# Patient Record
Sex: FEMALE | Race: WHITE | Employment: UNEMPLOYED | RURAL
[De-identification: names, ages, dates, MRNs, and addresses within clinical notes are randomized per-mention and may not be internally consistent; named-entity substitution may affect disease eponyms.]

---

## 2022-12-09 ENCOUNTER — HOSPITAL ENCOUNTER (EMERGENCY)
Age: 62
Discharge: ANOTHER HOSPITAL | End: 2022-12-09
Attending: EMERGENCY MEDICINE
Payer: MEDICAID

## 2022-12-09 ENCOUNTER — APPOINTMENT (OUTPATIENT)
Dept: CT IMAGING | Age: 62
End: 2022-12-09
Attending: EMERGENCY MEDICINE
Payer: MEDICAID

## 2022-12-09 VITALS
SYSTOLIC BLOOD PRESSURE: 127 MMHG | TEMPERATURE: 98.9 F | WEIGHT: 183 LBS | OXYGEN SATURATION: 97 % | HEART RATE: 63 BPM | RESPIRATION RATE: 14 BRPM | DIASTOLIC BLOOD PRESSURE: 67 MMHG | BODY MASS INDEX: 29.54 KG/M2

## 2022-12-09 DIAGNOSIS — H49.22 LEFT ABDUCENS NERVE PALSY: Primary | ICD-10-CM

## 2022-12-09 LAB
ALBUMIN SERPL-MCNC: 3.5 G/DL (ref 3.5–5)
ALBUMIN/GLOB SERPL: 1 {RATIO} (ref 1.1–2.2)
ALP SERPL-CCNC: 135 U/L (ref 45–117)
ALT SERPL-CCNC: 23 U/L (ref 12–78)
ANION GAP SERPL CALC-SCNC: 8 MMOL/L (ref 5–15)
AST SERPL-CCNC: 16 U/L (ref 15–37)
BASOPHILS # BLD: 0 K/UL (ref 0–0.1)
BASOPHILS NFR BLD: 0 % (ref 0–1)
BILIRUB SERPL-MCNC: 0.4 MG/DL (ref 0.2–1)
BUN SERPL-MCNC: 15 MG/DL (ref 6–20)
BUN/CREAT SERPL: 12 (ref 12–20)
CALCIUM SERPL-MCNC: 8.8 MG/DL (ref 8.5–10.1)
CHLORIDE SERPL-SCNC: 106 MMOL/L (ref 97–108)
CO2 SERPL-SCNC: 27 MMOL/L (ref 21–32)
CREAT SERPL-MCNC: 1.24 MG/DL (ref 0.55–1.02)
DIFFERENTIAL METHOD BLD: ABNORMAL
EOSINOPHIL # BLD: 0.4 K/UL (ref 0–0.4)
EOSINOPHIL NFR BLD: 5 % (ref 0–7)
ERYTHROCYTE [DISTWIDTH] IN BLOOD BY AUTOMATED COUNT: 13.2 % (ref 11.5–14.5)
FLUAV RNA SPEC QL NAA+PROBE: NOT DETECTED
FLUBV RNA SPEC QL NAA+PROBE: NOT DETECTED
GLOBULIN SER CALC-MCNC: 3.6 G/DL (ref 2–4)
GLUCOSE SERPL-MCNC: 119 MG/DL (ref 65–100)
HCT VFR BLD AUTO: 34.2 % (ref 35–47)
HGB BLD-MCNC: 11.4 G/DL (ref 11.5–16)
IMM GRANULOCYTES # BLD AUTO: 0 K/UL (ref 0–0.04)
IMM GRANULOCYTES NFR BLD AUTO: 0 % (ref 0–0.5)
INR PPP: 1 (ref 0.9–1.1)
LYMPHOCYTES # BLD: 2.2 K/UL (ref 0.8–3.5)
LYMPHOCYTES NFR BLD: 32 % (ref 12–49)
MCH RBC QN AUTO: 29.8 PG (ref 26–34)
MCHC RBC AUTO-ENTMCNC: 33.3 G/DL (ref 30–36.5)
MCV RBC AUTO: 89.3 FL (ref 80–99)
MONOCYTES # BLD: 0.7 K/UL (ref 0–1)
MONOCYTES NFR BLD: 9 % (ref 5–13)
NEUTS SEG # BLD: 3.6 K/UL (ref 1.8–8)
NEUTS SEG NFR BLD: 54 % (ref 32–75)
NRBC # BLD: 0 K/UL (ref 0–0.01)
NRBC BLD-RTO: 0 PER 100 WBC
PLATELET # BLD AUTO: 299 K/UL (ref 150–400)
PMV BLD AUTO: 9.2 FL (ref 8.9–12.9)
POTASSIUM SERPL-SCNC: 3.2 MMOL/L (ref 3.5–5.1)
PROT SERPL-MCNC: 7.1 G/DL (ref 6.4–8.2)
PROTHROMBIN TIME: 10.1 SEC (ref 9–11.1)
RBC # BLD AUTO: 3.83 M/UL (ref 3.8–5.2)
SARS-COV-2, COV2: NOT DETECTED
SODIUM SERPL-SCNC: 141 MMOL/L (ref 136–145)
TSH SERPL DL<=0.05 MIU/L-ACNC: 1.34 UIU/ML (ref 0.36–3.74)
WBC # BLD AUTO: 6.9 K/UL (ref 3.6–11)

## 2022-12-09 PROCEDURE — 74011000636 HC RX REV CODE- 636: Performed by: EMERGENCY MEDICINE

## 2022-12-09 PROCEDURE — 93005 ELECTROCARDIOGRAM TRACING: CPT

## 2022-12-09 PROCEDURE — 36415 COLL VENOUS BLD VENIPUNCTURE: CPT

## 2022-12-09 PROCEDURE — 70496 CT ANGIOGRAPHY HEAD: CPT

## 2022-12-09 PROCEDURE — 74011250637 HC RX REV CODE- 250/637: Performed by: EMERGENCY MEDICINE

## 2022-12-09 PROCEDURE — 85610 PROTHROMBIN TIME: CPT

## 2022-12-09 PROCEDURE — 85025 COMPLETE CBC W/AUTO DIFF WBC: CPT

## 2022-12-09 PROCEDURE — 87636 SARSCOV2 & INF A&B AMP PRB: CPT

## 2022-12-09 PROCEDURE — 80053 COMPREHEN METABOLIC PANEL: CPT

## 2022-12-09 PROCEDURE — 70450 CT HEAD/BRAIN W/O DYE: CPT

## 2022-12-09 PROCEDURE — 84443 ASSAY THYROID STIM HORMONE: CPT

## 2022-12-09 PROCEDURE — 99285 EMERGENCY DEPT VISIT HI MDM: CPT

## 2022-12-09 RX ORDER — ACETAMINOPHEN 325 MG/1
975 TABLET ORAL
Status: COMPLETED | OUTPATIENT
Start: 2022-12-09 | End: 2022-12-09

## 2022-12-09 RX ORDER — ASPIRIN 325 MG
325 TABLET ORAL ONCE
Status: COMPLETED | OUTPATIENT
Start: 2022-12-09 | End: 2022-12-09

## 2022-12-09 RX ORDER — POTASSIUM CHLORIDE 750 MG/1
40 TABLET, FILM COATED, EXTENDED RELEASE ORAL
Status: COMPLETED | OUTPATIENT
Start: 2022-12-09 | End: 2022-12-09

## 2022-12-09 RX ADMIN — ACETAMINOPHEN 975 MG: 325 TABLET, FILM COATED ORAL at 21:27

## 2022-12-09 RX ADMIN — ASPIRIN 325 MG ORAL TABLET 325 MG: 325 PILL ORAL at 18:51

## 2022-12-09 RX ADMIN — POTASSIUM CHLORIDE 40 MEQ: 750 TABLET, FILM COATED, EXTENDED RELEASE ORAL at 16:35

## 2022-12-09 RX ADMIN — IOPAMIDOL 100 ML: 755 INJECTION, SOLUTION INTRAVENOUS at 18:26

## 2022-12-09 NOTE — ED NOTES
Assumed care of patient at this time.  Care handoff given from [address] [place] [address] Street

## 2022-12-09 NOTE — ED PROVIDER NOTES
EMERGENCY DEPARTMENT HISTORY AND PHYSICAL EXAM      Date: 12/9/2022  Patient Name: Milana Armendariz    History of Presenting Illness     Chief Complaint   Patient presents with    Eye Problem     DOUBLE VISION following a sinus infection last week - left eye with decreased tracking noted       History Provided By: Patient    HPI: Milana Armendariz, 63 y.o. female with PMHx significant for asthma, hypertension, high cholesterol, CHF, presents via private vehicle to the ED with cc of change. Patient reports for the past week she and her entire family had upper respiratory infections/sinus infections. Reports nasal congestion and minimal dry cough. Over the past several days of the illness she noticed that she was having some problems with her vision. She thought this was just due to sinus pressure. Over the past 2 days her sinus pressure increased and she noticed some blurred vision. Her friend yesterday saw her in noted that her left eye was not tracking well with her right eye. She denies any extremity weakness numbness or tingling. She reports some headaches but that is been going on since the start of her upper respiratory illness. PMHx: Significant for asthma, hypertension, high cholesterol, CHF  PSHx: No pertinent surgical history   social Hx: Former smoker. Quit in 2007. Denies drug or alcohol use. There are no other complaints, changes, or physical findings at this time. PCP: Other, MD Ruy    No current facility-administered medications on file prior to encounter. Current Outpatient Medications on File Prior to Encounter   Medication Sig Dispense Refill    DULoxetine (CYMBALTA) 30 mg capsule Take 30 mg by mouth daily. sulfaSALAzine (AZULFIDINE) 500 mg tablet Take 500 mg by mouth two (2) times a day. neomycin-colist-hydrocortisone-thonzonium (CORTISPORIN TC,COLY-MYCIN S) otic suspension Administer 3 Drops in left ear four (4) times daily.  10 mL 0    cetirizine (ZYRTEC) 10 mg tablet Take 1 Tab by mouth daily. 20 Tab 0    neomycin-polymyxin-hydrocortisone, buffered, (PEDIOTIC) 3.5-10,000-1 mg/mL-unit/mL-% otic suspension Administer 3 Drops in left ear four (4) times daily. 10 mL 0    spironolactone (ALDACTONE) 25 mg tablet Take 25 mg by mouth daily. furosemide (LASIX) 40 mg tablet Take 40 mg by mouth daily. 1.5 tab in am and 1 tab in afternoon   Indications: CHF      carvedilol (COREG) 25 mg tablet Take 25 mg by mouth daily. lisinopril (PRINIVIL, ZESTRIL) 5 mg tablet Take 5 mg by mouth daily. trospium (SANCTURA XL) 60 mg capsule Take 60 mg by mouth Daily (before breakfast). aspirin (SHAMAR CHEWABLE ASPIRIN) 81 mg chewable tablet Take 81 mg by mouth nightly. ondansetron hcl (ZOFRAN, AS HYDROCHLORIDE,) 4 mg tablet Take 4 mg by mouth every twelve (12) hours as needed for Nausea (1-2 tabs as needed for nausea). nystatin (MYCOSTATIN) 500,000 unit tab Take 2 Tabs by mouth two (2) times a day. Indications: MUCOCUTANEOUS CANDIDIASIS      ferrous sulfate 324 mg (65 mg iron) tablet Take 130 mg by mouth Daily (before breakfast). Indications: IRON DEFICIENCY ANEMIA      OMEGA-3/DHA/EPA/FISH OIL (FISH OIL HIGH POTENCY PO) Take 1 Cap by mouth two (2) times a day. calcium-cholecalciferol, d3, (CALCIUM 600 + D) 600-125 mg-unit tab Take 1 Cap by mouth. cholecalciferol, vitamin D3, (VITAMIN D3) 2,000 unit tab Take 1 Tab by mouth daily. ASHWAGANDHA ROOT EXTRACT,BULK, 2 Caps by Does Not Apply route two (2) times a day. TURMERIC (CURCUMIN) 2 Caps by Does Not Apply route three (3) times daily. B.ANI/L. ACI/L.SAL/L. PLAN/L.CARRIE (PROBIOTIC FORMULA PO) Take 2 Caps by mouth two (2) times a day.          Past History     Past Medical History:  Past Medical History:   Diagnosis Date    Asthma     Heart failure ([address]     Hypercholesteremia     Hypertension     Ill-defined condition     Psoriasis        Past Surgical History:  Past Surgical History:   Procedure Laterality Date    HX OTHER SURGICAL Bilateral     JAW plates    HX OTHER SURGICAL Right     Ankle with screws and plates       Family History:  Family History   Problem Relation Age of Onset    Heart Disease Father     Diabetes Father     Hypertension Father     Kidney Disease Father     High Cholesterol Father        Social History:  Social History     Tobacco Use    Smoking status: Former     Types: Cigarettes     Quit date: 5/15/2007     Years since quitting: 15.5    Smokeless tobacco: Never   Substance Use Topics    Alcohol use: No    Drug use: No       Allergies: Allergies   Allergen Reactions    Beef Containing Products Anaphylaxis    Pork/Porcine Containing Products Anaphylaxis    Gold Au 198 Rash         Review of Systems   Review of Systems   Constitutional:  Negative for activity change, chills and fever. HENT:  Positive for congestion, sinus pressure and sinus pain. Negative for sore throat. Eyes:  Positive for visual disturbance. Negative for pain and redness. Respiratory:  Negative for cough, chest tightness and shortness of breath. Cardiovascular:  Negative for chest pain and palpitations. Gastrointestinal:  Negative for abdominal pain, diarrhea, nausea and vomiting. Genitourinary:  Negative for dysuria, frequency and urgency. Musculoskeletal:  Negative for back pain and neck pain. Skin:  Negative for rash. Neurological:  Positive for headaches. Negative for syncope and light-headedness. Psychiatric/Behavioral:  Negative for confusion. All other systems reviewed and are negative. Physical Exam   Physical Exam  Vitals and nursing note reviewed. Constitutional:       General: She is not in acute distress. Appearance: She is well-developed. She is not diaphoretic. HENT:      Head: Normocephalic. Nose: Nose normal.      Mouth/Throat:      Pharynx: No oropharyngeal exudate. Eyes:      General: No scleral icterus.      Conjunctiva/sclera: Conjunctivae normal. Pupils: Pupils are equal, round, and reactive to light. Neck:      Thyroid: No thyromegaly. Vascular: No JVD. Trachea: No tracheal deviation. Cardiovascular:      Rate and Rhythm: Normal rate and regular rhythm. Heart sounds: No murmur heard. No friction rub. No gallop. Pulmonary:      Effort: Pulmonary effort is normal. No respiratory distress. Breath sounds: Normal breath sounds. No stridor. No wheezing or rales. Abdominal:      General: Bowel sounds are normal. There is no distension. Palpations: Abdomen is soft. Tenderness: There is no abdominal tenderness. There is no guarding or rebound. Musculoskeletal:         General: Normal range of motion. Cervical back: Normal range of motion and neck supple. Lymphadenopathy:      Cervical: No cervical adenopathy. Skin:     General: Skin is warm and dry. Findings: No erythema or rash. Neurological:      Mental Status: She is alert and oriented to person, place, and time. Motor: No abnormal muscle tone. Coordination: Coordination normal.      Comments: Presents is some disconjugate gaze. Right eye appears to move normally in all directions. On conjugate gaze left eye is able to look medially and track well with the right eye however looking laterally the eye moves much more slowly than the right eye although it eventually does get to the extreme lateral gaze. Pupils are equal and reactive to light. Light reflex is consensual in both eyes. Opens and shuts eyes without difficulty. No apparent lid lag. Face symmetrical.  Clear speech. 5 out of 5 strength in bilateral upper and lower extremities. No past-pointing. Good heel-to-shin.    Psychiatric:         Behavior: Behavior normal.           Diagnostic Study Results     Labs -     Recent Results (from the past 12 hour(s))   CBC WITH AUTOMATED DIFF    Collection Time: 12/11/22  4:00 AM   Result Value Ref Range    WBC 5.9 3.6 - 11.0 K/uL    RBC 4.03 3.80 - 5.20 M/uL    HGB 12.2 11.5 - 16.0 g/dL    HCT 37.3 35.0 - 47.0 %    MCV 92.6 80.0 - 99.0 FL    MCH 30.3 26.0 - 34.0 PG    MCHC 32.7 30.0 - 36.5 g/dL    RDW 13.2 11.5 - 14.5 %    PLATELET 901 430 - 454 K/uL    MPV 9.4 8.9 - 12.9 FL    NRBC 0.0 0  WBC    ABSOLUTE NRBC 0.00 0.00 - 0.01 K/uL    NEUTROPHILS 54 32 - 75 %    LYMPHOCYTES 31 12 - 49 %    MONOCYTES 8 5 - 13 %    EOSINOPHILS 7 0 - 7 %    BASOPHILS 0 0 - 1 %    IMMATURE GRANULOCYTES 0 0.0 - 0.5 %    ABS. NEUTROPHILS 3.2 1.8 - 8.0 K/UL    ABS. LYMPHOCYTES 1.8 0.8 - 3.5 K/UL    ABS. MONOCYTES 0.5 0.0 - 1.0 K/UL    ABS. EOSINOPHILS 0.4 0.0 - 0.4 K/UL    ABS. BASOPHILS 0.0 0.0 - 0.1 K/UL    ABS. IMM. GRANS. 0.0 0.00 - 0.04 K/UL    DF AUTOMATED     METABOLIC PANEL, COMPREHENSIVE    Collection Time: 12/11/22  4:00 AM   Result Value Ref Range    Sodium 142 136 - 145 mmol/L    Potassium 3.7 3.5 - 5.1 mmol/L    Chloride 105 97 - 108 mmol/L    CO2 28 21 - 32 mmol/L    Anion gap 9 5 - 15 mmol/L    Glucose 117 (H) 65 - 100 mg/dL    BUN 16 6 - 20 MG/DL    Creatinine 0.96 0.55 - 1.02 MG/DL    BUN/Creatinine ratio 17 12 - 20      eGFR >60 >60 ml/min/1.73m2    Calcium 9.2 8.5 - 10.1 MG/DL    Bilirubin, total 0.5 0.2 - 1.0 MG/DL    ALT (SGPT) 24 12 - 78 U/L    AST (SGOT) 13 (L) 15 - 37 U/L    Alk. phosphatase 129 (H) 45 - 117 U/L    Protein, total 7.0 6.4 - 8.2 g/dL    Albumin 3.3 (L) 3.5 - 5.0 g/dL    Globulin 3.7 2.0 - 4.0 g/dL    A-G Ratio 0.9 (L) 1.1 - 2.2     HEMOGLOBIN A1C WITH EAG    Collection Time: 12/11/22  4:00 AM   Result Value Ref Range    Hemoglobin A1c 5.7 (H) 4.0 - 5.6 %    Est. average glucose 117 mg/dL       Radiologic Studies -   CTA HEAD NECK W CONT   Final Result      CTA    1. CTA neck demonstrates no large vessel occlusion, high-grade stenosis or   aneurysm. 2. 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  Findings may represent moyamoya   disease versus chronic atherosclerotic occlusion. Correlate with brain MRI to   exclude acute infarct. 3. No intracranial aneurysm or obvious intraorbital mass lesion. CT HEAD WO CONT   Final Result   No acute intracranial abnormality. CT Results  (Last 48 hours)                 12/09/22 1826  CTA HEAD NECK W CONT Final result    Impression:      CTA    1. CTA neck demonstrates no large vessel occlusion, high-grade stenosis or   aneurysm. 2. 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 Findings may represent moyamoya   disease versus chronic atherosclerotic occlusion. Correlate with brain MRI to   exclude acute infarct. 3. No intracranial aneurysm or obvious intraorbital mass lesion. Narrative:  EXAM:  CTA HEAD NECK W CONT       INDICATION:   cva; left eye cn 6 palsy       COMPARISON:  None. CONTRAST:  100 mL of Isovue-370. TECHNIQUE:  Unenhanced  images were obtained to localize the volume for   acquisition. Multislice helical axial CT angiography was performed from the   aortic arch to the top of the head during uneventful rapid bolus intravenous   contrast administration. Coronal and sagittal reformations and 3D/MIP  post   processing were performed. CT dose reduction was achieved through use of a standardized protocol tailored   for this examination and automatic exposure control for dose modulation. This   study was analyzed by the [address] ai algorithm. FINDINGS:       CTA Head:   Proximal occlusion of the right MCA, likely chronic with extensive small vessel   collaterals formation. There is no evidence of large vessel occlusion or flow-limiting stenosis of the   intracranial internal carotid, anterior cerebral, and right middle cerebral   arteries. Calcification of the bilateral carotid siphons without significant   stenosis. The anterior communicating artery is patent.         There is no evidence of large vessel occlusion or flow-limiting stenosis of the   intracranial vertebral arteries, basilar artery, or posterior cerebral arteries. There is no definite posterior communicating arteries identified. There is no evidence of aneurysm or vascular malformation. The dural venous   sinuses and deep cerebral venous system are patent. No evidence of abnormal   enhancement on delayed phase images. Brain parenchyma demonstrates no suggestion of acute infarct. CTA NECK:   NASCET method was utilized for calculating stenosis. The aortic arch is unremarkable. The common carotid arteries demonstrate no   significant stenosis. There is no evidence of significant stenosis in the   cervical right internal carotid artery. There is no evidence of significant   stenosis in the cervical left internal carotid artery. Carotid bulbs plaques   without significant luminal narrowing.    % of right carotid artery stenosis: 0   % of left carotid artery stenosis: 0       There is a codominant vertebrobasilar arterial system. The cervical vertebral   arteries are normal in course, size and contour without significant stenosis. Dental amalgam associated metallic artifact precludes optimal evaluation of the   adjacent structures, oral cavity and oropharynx. Maxillofacial reconstruction   hardware along bilateral anterior maxillary wall. The orbits are bilaterally   unremarkable and without obvious mass lesion. Visualized soft tissues of the   skull base and neck are unremarkable. Visualized paranasal sinuses and mastoids   are patent. Visualized lung apices are clear. No acute fracture or aggressive osseous lesion. Degenerative changes of the   cervical spine. 12/09/22 1559  CT HEAD WO CONT Final result    Impression:  No acute intracranial abnormality. Narrative:  EXAM: CT HEAD WO CONT       INDICATION: Vision change; new strabismus       COMPARISON: None. CONTRAST: None. TECHNIQUE: Unenhanced CT of the head was performed using 5 mm images. Brain and   bone windows were generated. Coronal and sagittal reformats. CT dose reduction   was achieved through use of a standardized protocol tailored for this   examination and automatic exposure control for dose modulation. FINDINGS:   The ventricles and sulci are normal in size, shape and configuration. . There is   no significant white matter disease. There is no intracranial hemorrhage,   extra-axial collection, or mass effect. The basilar cisterns are open. No CT   evidence of acute infarct. The bone windows demonstrate no abnormalities. The visualized portions of the   paranasal sinuses and mastoid air cells are clear. CXR Results  (Last 48 hours)                 12/10/22 0458  XR CHEST PORT Final result    Impression:  1. No acute disease           Narrative:  INDICATION:  Congestion        Exam: Portable chest 0450. Comparison: 5/15/2017. Findings: Cardiomediastinal silhouette is within normal limits. Pulmonary   vasculature is not engorged. There are no focal parenchymal opacities,   effusions, or pneumothorax. Scarring in the left mid chest unchanged                     Medical Decision Making   I am the first provider for this patient. I reviewed the vital signs, available nursing notes, past medical history, past surgical history, family history and social history. Vital Signs-Reviewed the patient's vital signs. No data found. Pulse Oximetry Analysis - 95% on RA    Cardiac Monitor:   Rate: 61 bpm  Rhythm: Normal Sinus Rhythm        ED EKG interpretation:1609  Rhythm: Normal sinus rhythm; and regular . Rate (approx.): 61; Axis: normal; TN Interval: normal; QRS interval: normal ; ST/T wave: non-specific changes; This EKG was interpreted by Jaimee Ghosh MD,ED Provider.       Records Reviewed: Nursing Notes and Old Medical Records    Provider Notes (Medical Decision Making): DDx: CVA, cranial nerve palsy    ED Course:   Initial assessment performed. The patients presenting problems have been discussed, and they are in agreement with the care plan formulated and outlined with them. I have encouraged them to ask questions as they arise throughout their visit. ED Course as of 12/11/22 1233   Fri Dec 09, 2022   1655 Consulted teleneurology given patient's new strabismus. No other neurodeficits. Labs unremarkable other than mild hypokalemia. Reviewed physical exam findings. She was to evaluate patient via teleneurology. [CH]   1662 Patient evaluated by Dr. Jerez Person with teleneurology. PACs and patient may have a partial CN VI/abducens nerve palsy. She recommended admission for CVA work-up. Recommended obtaining a TSH. Recommended CTA head and neck CTV and MRI. MRI not available currently until next Monday. We will plan on transfer to another facility for further stroke work-up. Contacted [address] [place] [address] for possible transfer. [CH]   [phone] I spoke with Dr. Mey De, hospitalist at [facility]. Reviewed patient's history, presentation labs and imaging findings and my discussion with teleneurology Dr. Solitario Mina.  He excepted the patient as transfer to [address] [place] [address]      ED Course User Index  [CH] Laisha Wong MD         Patient with new left eye strabismus. No complete cranial nerve abnormality identified. Eye abduction is slightly reduced or slowed on the left compared to the. No other neurodeficits. Will consult teleneurology. Labs unremarkable other than mild hypokalemia at 3.2. No way of measuring intraocular pressures here as Thiago-Pen is broken. Critical Care Time:   0    Disposition:  Transfer     PLAN:  1. Discharge Medication List as of 12/9/2022  9:43 PM        2. Follow-up Information    None       Return to ED if worse     Diagnosis     Clinical Impression:   1.  Left abducens nerve palsy              Please note that this dictation was completed with Neu[employer], the computer voice recognition software. Quite often unanticipated grammatical, syntax, homophones, and other interpretive errors are inadvertently transcribed by the computer software. Please disregard these errors. Please excuse any errors that have escaped final proofreading.

## 2022-12-10 ENCOUNTER — HOSPITAL ENCOUNTER (INPATIENT)
Age: 62
LOS: 5 days | Discharge: HOME | DRG: 048 | End: 2022-12-15
Attending: INTERNAL MEDICINE | Admitting: STUDENT IN AN ORGANIZED HEALTH CARE EDUCATION/TRAINING PROGRAM
Payer: MEDICAID

## 2022-12-10 ENCOUNTER — APPOINTMENT (OUTPATIENT)
Dept: GENERAL RADIOLOGY | Age: 62
DRG: 048 | End: 2022-12-10
Attending: PHYSICIAN ASSISTANT
Payer: MEDICAID

## 2022-12-10 ENCOUNTER — APPOINTMENT (OUTPATIENT)
Dept: MRI IMAGING | Age: 62
DRG: 048 | End: 2022-12-10
Attending: PHYSICIAN ASSISTANT
Payer: MEDICAID

## 2022-12-10 DIAGNOSIS — H53.2 DIPLOPIA: ICD-10-CM

## 2022-12-10 DIAGNOSIS — H49.22 LEFT ABDUCENS NERVE PALSY: Primary | ICD-10-CM

## 2022-12-10 PROBLEM — H49.20 CRANIAL NERVE VI PALSY: Status: ACTIVE | Noted: 2022-12-10

## 2022-12-10 LAB
CHOLEST SERPL-MCNC: 166 MG/DL
HDLC SERPL-MCNC: 40 MG/DL
HDLC SERPL: 4.2 {RATIO} (ref 0–5)
LDLC SERPL CALC-MCNC: 56.4 MG/DL (ref 0–100)
TRIGL SERPL-MCNC: 348 MG/DL (ref ?–150)
VLDLC SERPL CALC-MCNC: 69.6 MG/DL

## 2022-12-10 PROCEDURE — 71045 X-RAY EXAM CHEST 1 VIEW: CPT

## 2022-12-10 PROCEDURE — 97165 OT EVAL LOW COMPLEX 30 MIN: CPT

## 2022-12-10 PROCEDURE — 74011250637 HC RX REV CODE- 250/637: Performed by: NURSE PRACTITIONER

## 2022-12-10 PROCEDURE — 74011000250 HC RX REV CODE- 250: Performed by: PHYSICIAN ASSISTANT

## 2022-12-10 PROCEDURE — 36415 COLL VENOUS BLD VENIPUNCTURE: CPT

## 2022-12-10 PROCEDURE — 86225 DNA ANTIBODY NATIVE: CPT

## 2022-12-10 PROCEDURE — 74011250637 HC RX REV CODE- 250/637: Performed by: PHYSICIAN ASSISTANT

## 2022-12-10 PROCEDURE — 70551 MRI BRAIN STEM W/O DYE: CPT

## 2022-12-10 PROCEDURE — 82164 ANGIOTENSIN I ENZYME TEST: CPT

## 2022-12-10 PROCEDURE — 80061 LIPID PANEL: CPT

## 2022-12-10 PROCEDURE — 65270000046 HC RM TELEMETRY

## 2022-12-10 PROCEDURE — 99223 1ST HOSP IP/OBS HIGH 75: CPT | Performed by: PSYCHIATRY & NEUROLOGY

## 2022-12-10 PROCEDURE — 86618 LYME DISEASE ANTIBODY: CPT

## 2022-12-10 PROCEDURE — 97116 GAIT TRAINING THERAPY: CPT

## 2022-12-10 PROCEDURE — 97161 PT EVAL LOW COMPLEX 20 MIN: CPT

## 2022-12-10 PROCEDURE — 83519 RIA NONANTIBODY: CPT

## 2022-12-10 RX ORDER — TROSPIUM CHLORIDE 20 MG/1
20 TABLET, FILM COATED ORAL 2 TIMES DAILY
Status: DISCONTINUED | OUTPATIENT
Start: 2022-12-10 | End: 2022-12-15 | Stop reason: HOSPADM

## 2022-12-10 RX ORDER — ACETAMINOPHEN 650 MG/1
650 SUPPOSITORY RECTAL
Status: DISCONTINUED | OUTPATIENT
Start: 2022-12-10 | End: 2022-12-15 | Stop reason: HOSPADM

## 2022-12-10 RX ORDER — FUROSEMIDE 40 MG/1
40 TABLET ORAL DAILY
Status: DISCONTINUED | OUTPATIENT
Start: 2022-12-10 | End: 2022-12-15 | Stop reason: HOSPADM

## 2022-12-10 RX ORDER — LISINOPRIL 5 MG/1
5 TABLET ORAL DAILY
Status: DISCONTINUED | OUTPATIENT
Start: 2022-12-11 | End: 2022-12-10

## 2022-12-10 RX ORDER — CETIRIZINE HYDROCHLORIDE 10 MG/1
10 TABLET ORAL DAILY
Status: DISCONTINUED | OUTPATIENT
Start: 2022-12-10 | End: 2022-12-15 | Stop reason: HOSPADM

## 2022-12-10 RX ORDER — HYDRALAZINE HYDROCHLORIDE 20 MG/ML
20 INJECTION INTRAMUSCULAR; INTRAVENOUS
Status: DISCONTINUED | OUTPATIENT
Start: 2022-12-10 | End: 2022-12-15 | Stop reason: HOSPADM

## 2022-12-10 RX ORDER — SODIUM CHLORIDE 0.9 % (FLUSH) 0.9 %
5-40 SYRINGE (ML) INJECTION AS NEEDED
Status: DISCONTINUED | OUTPATIENT
Start: 2022-12-10 | End: 2022-12-15 | Stop reason: HOSPADM

## 2022-12-10 RX ORDER — ACETAMINOPHEN 325 MG/1
650 TABLET ORAL
Status: DISCONTINUED | OUTPATIENT
Start: 2022-12-10 | End: 2022-12-15 | Stop reason: HOSPADM

## 2022-12-10 RX ORDER — DULOXETIN HYDROCHLORIDE 30 MG/1
60 CAPSULE, DELAYED RELEASE ORAL 2 TIMES DAILY
Status: DISCONTINUED | OUTPATIENT
Start: 2022-12-10 | End: 2022-12-15 | Stop reason: HOSPADM

## 2022-12-10 RX ORDER — BUPROPION HYDROCHLORIDE 150 MG/1
150 TABLET, EXTENDED RELEASE ORAL DAILY
Status: DISCONTINUED | OUTPATIENT
Start: 2022-12-10 | End: 2022-12-15 | Stop reason: HOSPADM

## 2022-12-10 RX ORDER — CARVEDILOL 12.5 MG/1
25 TABLET ORAL DAILY
Status: DISCONTINUED | OUTPATIENT
Start: 2022-12-10 | End: 2022-12-15 | Stop reason: HOSPADM

## 2022-12-10 RX ORDER — GUAIFENESIN 600 MG/1
600 TABLET, EXTENDED RELEASE ORAL EVERY 12 HOURS
Status: DISCONTINUED | OUTPATIENT
Start: 2022-12-10 | End: 2022-12-15 | Stop reason: HOSPADM

## 2022-12-10 RX ORDER — LANOLIN ALCOHOL/MO/W.PET/CERES
3 CREAM (GRAM) TOPICAL
Status: DISCONTINUED | OUTPATIENT
Start: 2022-12-10 | End: 2022-12-15 | Stop reason: HOSPADM

## 2022-12-10 RX ORDER — ONDANSETRON 2 MG/ML
4 INJECTION INTRAMUSCULAR; INTRAVENOUS
Status: DISCONTINUED | OUTPATIENT
Start: 2022-12-10 | End: 2022-12-15 | Stop reason: HOSPADM

## 2022-12-10 RX ORDER — BUTALBITAL, ACETAMINOPHEN AND CAFFEINE 50; 325; 40 MG/1; MG/1; MG/1
1 TABLET ORAL
Status: DISCONTINUED | OUTPATIENT
Start: 2022-12-10 | End: 2022-12-15 | Stop reason: HOSPADM

## 2022-12-10 RX ORDER — POLYETHYLENE GLYCOL 3350 17 G/17G
17 POWDER, FOR SOLUTION ORAL DAILY PRN
Status: DISCONTINUED | OUTPATIENT
Start: 2022-12-10 | End: 2022-12-15 | Stop reason: HOSPADM

## 2022-12-10 RX ORDER — BUPROPION HYDROCHLORIDE 150 MG/1
TABLET, EXTENDED RELEASE ORAL
COMMUNITY
Start: 2022-10-21

## 2022-12-10 RX ORDER — FUROSEMIDE 20 MG/1
TABLET ORAL
COMMUNITY
End: 2022-12-15

## 2022-12-10 RX ORDER — SODIUM CHLORIDE 0.9 % (FLUSH) 0.9 %
5-40 SYRINGE (ML) INJECTION EVERY 8 HOURS
Status: DISCONTINUED | OUTPATIENT
Start: 2022-12-10 | End: 2022-12-15 | Stop reason: HOSPADM

## 2022-12-10 RX ORDER — MIRABEGRON 25 MG/1
25 TABLET, FILM COATED, EXTENDED RELEASE ORAL DAILY
COMMUNITY
Start: 2022-11-28

## 2022-12-10 RX ORDER — ASPIRIN 325 MG
325 TABLET ORAL DAILY
Status: DISCONTINUED | OUTPATIENT
Start: 2022-12-10 | End: 2022-12-15 | Stop reason: HOSPADM

## 2022-12-10 RX ORDER — HYDROXYZINE 25 MG/1
25 TABLET, FILM COATED ORAL
Status: DISCONTINUED | OUTPATIENT
Start: 2022-12-10 | End: 2022-12-15 | Stop reason: HOSPADM

## 2022-12-10 RX ORDER — ONDANSETRON 4 MG/1
4 TABLET, ORALLY DISINTEGRATING ORAL
Status: DISCONTINUED | OUTPATIENT
Start: 2022-12-10 | End: 2022-12-15 | Stop reason: HOSPADM

## 2022-12-10 RX ORDER — DULOXETIN HYDROCHLORIDE 60 MG/1
60 CAPSULE, DELAYED RELEASE ORAL 2 TIMES DAILY
COMMUNITY
Start: 2022-09-06

## 2022-12-10 RX ORDER — ATORVASTATIN CALCIUM 40 MG/1
40 TABLET, FILM COATED ORAL DAILY
Status: DISCONTINUED | OUTPATIENT
Start: 2022-12-10 | End: 2022-12-15 | Stop reason: HOSPADM

## 2022-12-10 RX ADMIN — FUROSEMIDE 40 MG: 40 TABLET ORAL at 09:20

## 2022-12-10 RX ADMIN — SODIUM CHLORIDE, PRESERVATIVE FREE 10 ML: 5 INJECTION INTRAVENOUS at 22:00

## 2022-12-10 RX ADMIN — SODIUM CHLORIDE, PRESERVATIVE FREE 10 ML: 5 INJECTION INTRAVENOUS at 06:03

## 2022-12-10 RX ADMIN — DULOXETINE HYDROCHLORIDE 60 MG: 30 CAPSULE, DELAYED RELEASE ORAL at 17:37

## 2022-12-10 RX ADMIN — ASPIRIN 325 MG ORAL TABLET 325 MG: 325 PILL ORAL at 09:20

## 2022-12-10 RX ADMIN — CETIRIZINE HYDROCHLORIDE 10 MG: 10 TABLET, FILM COATED ORAL at 09:20

## 2022-12-10 RX ADMIN — TROSPIUM CHLORIDE 20 MG: 20 TABLET, FILM COATED ORAL at 17:37

## 2022-12-10 RX ADMIN — ATORVASTATIN CALCIUM 40 MG: 40 TABLET, FILM COATED ORAL at 09:20

## 2022-12-10 RX ADMIN — BUPROPION HYDROCHLORIDE 150 MG: 150 TABLET, EXTENDED RELEASE ORAL at 09:20

## 2022-12-10 RX ADMIN — BUTALBITAL, ACETAMINOPHEN, AND CAFFEINE 1 TABLET: 50; 325; 40 TABLET ORAL at 16:26

## 2022-12-10 RX ADMIN — DULOXETINE HYDROCHLORIDE 60 MG: 30 CAPSULE, DELAYED RELEASE ORAL at 09:20

## 2022-12-10 RX ADMIN — TROSPIUM CHLORIDE 20 MG: 20 TABLET, FILM COATED ORAL at 09:25

## 2022-12-10 RX ADMIN — SODIUM CHLORIDE, PRESERVATIVE FREE 10 ML: 5 INJECTION INTRAVENOUS at 16:26

## 2022-12-10 RX ADMIN — ACETAMINOPHEN 650 MG: 325 TABLET ORAL at 06:42

## 2022-12-10 RX ADMIN — GUAIFENESIN 600 MG: 600 TABLET, EXTENDED RELEASE ORAL at 21:44

## 2022-12-10 RX ADMIN — CARVEDILOL 25 MG: 12.5 TABLET, FILM COATED ORAL at 19:14

## 2022-12-10 NOTE — PROGRESS NOTES
Problem: Mobility Impaired (Adult and Pediatric)  Goal: *Acute Goals and Plan of Care (Insert Text)  Description: FUNCTIONAL STATUS PRIOR TO ADMISSION: Patient was independent and active without use of DME. Patient was independent for basic and instrumental ADLs. Over the last 48 hours prior to admission had worsening double vision due to cranial nerve 6 palsy. HOME SUPPORT PRIOR TO ADMISSION: The patient lived with spouse but did not require assist.    Physical Therapy Goals  Initiated 12/10/2022  1. Patient will move from supine to sit and sit to supine , scoot up and down, and roll side to side in bed with independence within 7 day(s). 2.  Patient will transfer from bed to chair and chair to bed with independence using the least restrictive device within 7 day(s). 3.  Patient will perform sit to stand with independence within 7 day(s). 4.  Patient will ambulate with independence for 450 feet with the least restrictive device within 7 day(s). 5.  Patient will ascend/descend 12 stairs with 1 handrail(s) with modified independence within 7 day(s). Outcome: Not Met   PHYSICAL THERAPY EVALUATION  Patient: Rachael Villalobos (71 y.o. 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 Patient lying in bed with L eye patch in place and agrees to evaluation. Reports getting out of bed and moving about room, but has some depth perception issues due to eye patch. Demonstrated independent skill supine to sit with intact sitting balance. Stood independently. Gait demonstrated decreased speed, step lengths, narrow base of support with intermittent path deviations to her L side.  She is currently moderate risk for falls due to mildly impaired standing balance with decreased gait and bed to chair transfer skills. Will need 1-2 more PT sessions to re-assess balance and mobility. Current Level of Function Impacting Discharge (mobility/balance): independent bed mobility; sba bed to chair transfers; sba ambulation 45 feet. Functional Outcome Measure: The patient scored 23/28 on the Tinetti outcome measure which is indicative of being at a moderate risk for falls at this time. Other factors to consider for discharge: independent and active PLOF with supportive family; may need balance re-ed as an outpatient. Patient will benefit from skilled therapy intervention to address the above noted impairments. PLAN :  Recommendations and Planned Interventions: bed mobility training, transfer training, gait training, therapeutic exercises, neuromuscular re-education, patient and family training/education, and therapeutic activities      Frequency/Duration: Patient will be followed by physical therapy:  3 times a week to address goals. Recommendation for discharge: (in order for the patient to meet his/her long term goals)  Outpatient physical therapy follow up recommended for balance re-ed and/or ocular motor training. This discharge recommendation:  Has not yet been discussed the attending provider and/or case management    IF patient discharges home will need the following DME: rolling walker if standing balance and gait do not improve       SUBJECTIVE:   Patient stated  I'm just waiting to see what the MRI results say.      OBJECTIVE DATA SUMMARY:   HISTORY:    Past Medical History:   Diagnosis Date    Asthma     Heart failure ([facility] [address]     Hypercholesteremia     Hypertension     Ill-defined condition     Psoriasis      Past Surgical History:   Procedure Laterality Date    HX OTHER SURGICAL Bilateral     JAW plates    HX OTHER SURGICAL Right     Ankle with screws and plates       Personal factors and/or comorbidities impacting plan of care: remote L MCA occlusion    Home Situation  Home Environment: Private residence  One/Two Story Residence: Two story  Living Alone: No  Support Systems: Spouse/Significant Other  Patient Expects to be Discharged to[de-identified] Home  Current DME Used/Available at Home: None  Tub or Shower Type: Shower    EXAMINATION/PRESENTATION/DECISION MAKING:   Critical Behavior:  Neurologic State: Alert  Orientation Level: Oriented X4  Cognition: Appropriate decision making, Appropriate for age attention/concentration, Appropriate safety awareness, Follows commands  Safety/Judgement: Awareness of environment, Good awareness of safety precautions, Decreased insight into deficits  Hearing: Auditory  Auditory Impairment: None  Skin:  no findings  Edema: none  Range Of Motion:  AROM: Within functional limits           PROM: Within functional limits           Strength:    Strength:  Within functional limits                    Tone & Sensation:   Tone: Normal                              Coordination:  Coordination: Within functional limits  Vision:   Diplopia: Yes (L lateral rectus weakness)  Functional Mobility:  Bed Mobility:  Rolling: Independent  Supine to Sit: Independent  Sit to Supine: Independent  Scooting: Independent  Transfers:  Sit to Stand: Independent  Stand to Sit: Independent        Bed to Chair: Supervision              Balance:   Sitting: Intact  Standing: Impaired  Standing - Static: Good  Standing - Dynamic : Fair;Occasional  Ambulation/Gait Training:  Distance (ft): 45 Feet (ft)     Ambulation - Level of Assistance: Supervision;Stand-by assistance     Gait Description (WDL): Exceptions to WDL  Gait Abnormalities: Altered arm swing;Path deviations        Base of Support: Narrowed     Speed/Carmen: Pace decreased (<100 feet/min)  Step Length: Right shortened;Left shortened        Interventions: Safety awareness training    Functional Measure:  Tinetti test:    Sitting Balance: 1  Arises: 2  Attempts to Rise: 2  Immediate Standing Balance: 2  Standing Balance: 2  Nudged: 2  Eyes Closed: 1  Turn 360 Degrees - Continuous/Discontinuous: 1  Turn 360 Degrees - Steady/Unsteady: 1  Sitting Down: 2  Balance Score: 16 Balance total score  Indication of Gait: 1  R Step Length/Height: 0  L Step Length/Height: 0  R Foot Clearance: 1  L Foot Clearance: 1  Step Symmetry: 1  Step Continuity: 0  Path: 1  Trunk: 1  Walking Time: 1  Gait Score: 7 Gait total score  Total Score: 23/28 Overall total score         Tinetti Tool Score Risk of Falls  <19 = High Fall Risk  19-24 = Moderate Fall Risk  25-28 = Low Fall Risk  Tinetti ME. Performance-Oriented Assessment of Mobility Problems in Elderly Patients. [facility]; [id number].  (Scoring Description: PT Bulletin Feb. 10, 1993)    Older adults: Alexandra Gutierrez et al, 2009; n = 1000 [facility] elderly evaluated with ABC, BRUCE, ADL, and IADL)  · Mean BRUCE score for males aged 69-68 years = 26.21(3.40)  · Mean BRUCE score for females age 69-68 years = 25.16(4.30)  · Mean BRUCE score for males over 80 years = 23.29(6.02)  · Mean BRUCE score for females over 80 years = 17.20(8.32)        Physical Therapy Evaluation Charge Determination   History Examination Presentation Decision-Making   HIGH Complexity :3+ comorbidities / personal factors will impact the outcome/ POC  MEDIUM Complexity : 3 Standardized tests and measures addressing body structure, function, activity limitation and / or participation in recreation  MEDIUM Complexity : Evolving with changing characteristics  Other outcome measures Tinetti  MEDIUM      Based on the above components, the patient evaluation is determined to be of the following complexity level: MEDIUM    Pain Rating:  Reports headache - waiting to hear if additional pain med is possible    Activity Tolerance:   Good and SpO2 stable on RA    After treatment patient left in no apparent distress:   Supine in bed and Call bell within reach    COMMUNICATION/EDUCATION:   The patients plan of care was discussed with: Occupational therapist and Registered nurse. Fall prevention education was provided and the patient/caregiver indicated understanding., Patient/family have participated as able in goal setting and plan of care. , and Patient/family agree to work toward stated goals and plan of care.     Thank you for this referral.  Sadiq Cortes, PT   Time Calculation: 14 mins

## 2022-12-10 NOTE — ED NOTES
TRANSFER - OUT REPORT:    Verbal report given to Ashlie SANCHEZ RN on Rene Ordaz  being transferred to ED [facility] ([phone]) for routine progression of care       Report consisted of patients Situation, Background, Assessment and   Recommendations(SBAR). Information from the following report(s) SBAR, ED Summary, [facility] and Recent Results was reviewed with the receiving nurse. Lines:   Peripheral IV 12/09/22 Right Antecubital (Active)   Site Assessment Clean, dry, & intact 12/09/22 1741   Phlebitis Assessment 0 12/09/22 1741   Infiltration Assessment 0 12/09/22 1741   Dressing Status Clean, dry, & intact 12/09/22 1741        Opportunity for questions and clarification was provided.       Patient transported with:   [id number]

## 2022-12-10 NOTE — ED NOTES
Writer spoke to [facility]. Bed assignment 5097 Report # [phone].  Current [facility] ETA 2200

## 2022-12-10 NOTE — CONSULTS
Neurology Note    Patient ID:  Monica Cutler  [id number]  66 y.o.  1960      Date of Consultation:  December 10, 2022    Referring Physician: Dr. Lian Domingo    Reason for Consultation:  diplopia      Assessment and Plan:    The patient is a 72-year-old female with a subacute onset of diplopia after developing a recent viral illness. 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 Diplopia:    Differential includes ischemic, infectious, compressive, autoimmune  Initial head CT with no abnormality. CTA with no aneursymal compression    Ideally, the patient should receive a brain MRI with and without contrast.  Current MRI is ordered for without contrast due to elevated creatinine. If able, patient should receive a contrasted image. If the patient is unable to get a MRI due to prior metal, then the patient should receive a follow-up head CT with contrast.  After this and if symptoms are persisting without a clear etiology, the patient should receive a lumbar puncture. I did discuss this at length with the patient today. After discussing the risk and benefits of the procedure, the procedure should be performed under fluoroscopy. Please coordinate this with interventional radiology if needed. I will obtain serology looking for possible metabolic or autoimmune etiologies. She will continue to wear the eye patch for the time being. Htn: maintain sbp < 140  Continue aspirin 81 mg. Neurological continue to follow along closely in this complex patient with risk for neurological deterioration due to examination abnormalities and unclear diagnosis           Subjective: my vision     History of Present Illness:   Monica Cutler is a 63 y.o. female with a history of hypertension and dyslipidemia who presented to the emergency department due to double vision .   The patient states that her family has all had a recent viral illness and she began to develop head congestion and a cough within the past 2 weeks. She reports that she was coughing so much that she was losing her voice. She continued to develop a increasing pressure in her head that was bifrontal in nature. She then began noticing that she was developing double vision. Initially she felt the objects were horizontal but has noticed more of a skew double vision over the past 48 hours. She was out to lunch with a friend who noticed that her 1 eye was not moving and conjugate with the others and ultimately it was decided that she should seek medical attention. Since admission, there has been no worsening nor improvement in her symptoms. She initially went to [facility] where she did have a CT of her head and a CTA with no clear etiology. This morning, the patient denies any focal numbness, tingling, or weakness. She does continue to have the head congestion. Her diplopia does resolve when she closes 1 eye. She has good visual acuity.       Past Medical History:   Diagnosis Date    Asthma     Heart failure ([address]     Hypercholesteremia     Hypertension     Ill-defined condition     Psoriasis    The patient reports of having had a history of Lyme disease in the past that was quite severe is causing her to be wheelchair-bound for an extended period of time    Past Surgical History:   Procedure Laterality Date    HX OTHER SURGICAL Bilateral     JAW plates    HX OTHER SURGICAL Right     Ankle with screws and plates        Family History   Problem Relation Age of Onset    Heart Disease Father     Diabetes Father     Hypertension Father     Kidney Disease Father     High Cholesterol Father         Social History     Tobacco Use    Smoking status: Former     Types: Cigarettes     Quit date: 5/15/2007     Years since quitting: 15.5    Smokeless tobacco: Never   Substance Use Topics    Alcohol use: No        Allergies   Allergen Reactions    Beef Containing Products Anaphylaxis    Pork/Porcine Containing Products Anaphylaxis Gold Au 198 Rash        Prior to Admission medications    Medication Sig Start Date End Date Taking? Authorizing Provider   Myrbetriq 25 mg ER tablet Take 25 mg by mouth daily. 11/28/22   Provider, Historical   buPROPion SR (WELLBUTRIN SR) 150 mg SR tablet TAKE 1 TABLET BY MOUTH TWICE DAILY DO NOT CRUSH, CHEW OR SPLIT 10/21/22   Provider, Historical   DULoxetine (CYMBALTA) 60 mg capsule Take 60 mg by mouth two (2) times a day. 9/6/22   Provider, Historical   furosemide (LASIX) 20 mg tablet Take  by mouth. Provider, Historical   DULoxetine (CYMBALTA) 30 mg capsule Take 30 mg by mouth daily. Ruy Decker MD   sulfaSALAzine (AZULFIDINE) 500 mg tablet Take 500 mg by mouth two (2) times a day. Ruy Decker MD   neomycin-colist-hydrocortisone-thonzonium (CORTISPORIN TC,COLY-MYCIN S) otic suspension Administer 3 Drops in left ear four (4) times daily. 5/16/17   Melody Collado MD   cetirizine (ZYRTEC) 10 mg tablet Take 1 Tab by mouth daily. 5/16/17   Melody Collado MD   neomycin-polymyxin-hydrocortisone, buffered, (PEDIOTIC) 3.5-10,000-1 mg/mL-unit/mL-% otic suspension Administer 3 Drops in left ear four (4) times daily. 5/16/17   Melody Collado MD   spironolactone (ALDACTONE) 25 mg tablet Take 25 mg by mouth daily. Ruy Decker MD   furosemide (LASIX) 40 mg tablet Take 40 mg by mouth daily. 1.5 tab in am and 1 tab in afternoon   Indications: CHF    Ruy Decker MD   carvedilol (COREG) 25 mg tablet Take 25 mg by mouth daily. Ruy Decker MD   lisinopril (PRINIVIL, ZESTRIL) 5 mg tablet Take 5 mg by mouth daily. Ruy Decker MD   trospium (SANCTURA XL) 60 mg capsule Take 60 mg by mouth Daily (before breakfast). Ruy Decker MD   aspirin (SHAMAR CHEWABLE ASPIRIN) 81 mg chewable tablet Take 81 mg by mouth nightly. Ruy Decker MD   ondansetron hcl (ZOFRAN, AS HYDROCHLORIDE,) 4 mg tablet Take 4 mg by mouth every twelve (12) hours as needed for Nausea (1-2 tabs as needed for nausea).     Other, MD Ruy nystatin (MYCOSTATIN) 500,000 unit tab Take 2 Tabs by mouth two (2) times a day. Indications: MUCOCUTANEOUS CANDIDIASIS    Ruy Decker MD   ferrous sulfate 324 mg (65 mg iron) tablet Take 130 mg by mouth Daily (before breakfast). Indications: IRON DEFICIENCY ANEMIA    Ruy Decker MD   OMEGA-3/DHA/EPA/FISH OIL (FISH OIL HIGH POTENCY PO) Take 1 Cap by mouth two (2) times a day. Ruy Decker MD   calcium-cholecalciferol, d3, (CALCIUM 600 + D) 600-125 mg-unit tab Take 1 Cap by mouth. Ruy Decker MD   cholecalciferol, vitamin D3, (VITAMIN D3) 2,000 unit tab Take 1 Tab by mouth daily. Ruy Decker MD   ASHWAGANDHA ROOT EXTRACT,BULK, 2 Caps by Does Not Apply route two (2) times a day. Ruy Decker MD   TURMERIC (CURCUMIN) 2 Caps by Does Not Apply route three (3) times daily. Ruy Decker MD   B.ANI/L. ACI/L.SAL/L. PLAN/L.CARRIE (PROBIOTIC FORMULA PO) Take 2 Caps by mouth two (2) times a day.     Ruy Decker MD     Current Facility-Administered Medications   Medication Dose Route Frequency    aspirin tablet 325 mg  325 mg Oral DAILY    atorvastatin (LIPITOR) tablet 40 mg  40 mg Oral DAILY    sodium chloride (NS) flush 5-40 mL  5-40 mL IntraVENous Q8H    sodium chloride (NS) flush 5-40 mL  5-40 mL IntraVENous PRN    acetaminophen (TYLENOL) tablet 650 mg  650 mg Oral Q6H PRN    Or    acetaminophen (TYLENOL) suppository 650 mg  650 mg Rectal Q6H PRN    polyethylene glycol (MIRALAX) packet 17 g  17 g Oral DAILY PRN    ondansetron (ZOFRAN ODT) tablet 4 mg  4 mg Oral Q8H PRN    Or    ondansetron (ZOFRAN) injection 4 mg  4 mg IntraVENous Q6H PRN    buPROPion ER (ZYBAN,BUPROBAN) tablet 150 mg  150 mg Oral DAILY    cetirizine (ZYRTEC) tablet 10 mg  10 mg Oral DAILY    DULoxetine (CYMBALTA) capsule 60 mg  60 mg Oral BID    furosemide (LASIX) tablet 40 mg  40 mg Oral DAILY    trospium (SANCTURA) tablet 20 mg  20 mg Oral BID    hydrOXYzine HCL (ATARAX) tablet 25 mg  25 mg Oral TID PRN    hydrALAZINE (APRESOLINE) 20 mg/mL injection 20 mg  20 mg IntraVENous Q6H PRN    melatonin tablet 3 mg  3 mg Oral QHS PRN       Review of Systems:    General, constitutional:head congestion  Eyes, vision: negative  Ears, nose, throat: negative  Cardiovascular, heart: negative  Respiratory: negative  Gastrointestinal: negative  Genitourinary: negative  Musculoskeletal: negative  Skin and integumentary: negative  Psychiatric: negative  Endocrine: negative  Neurological: negative, except for HPI  Hematologic/lymphatic: negative  Allergy/immunology: negative    Objective:     Visit Vitals  BP (!) 137/50   Pulse (!) 58   Temp 97.3 °F (36.3 °C)   Resp 18   Ht 5' 6\" (1.676 m)   Wt 186 lb 8 oz (84.6 kg)   SpO2 98%   BMI 30.10 kg/m²       Physical Exam:    General:  appears well nourished in no acute distress  Neck: no carotid bruits  Lungs: clear to auscultation  Heart:  no murmurs, regular rate  Lower extremity: peripheral pulses palpable and no edema  Skin: intact    Neurological exam:    Awake, alert, oriented to person, place and time  Recent and remote memory were normal  Attention and concentration were intact  Language was intact. There was no aphasia  Speech: no dysarthria  Fund of knowledge was preserved    Cranial nerves:   II-XII were tested    Perrrla  Fundoscopic examination revealed venous pulsations and no clear abnormalities  Visual fields were full  She does have a disconjugate primary gaze with intorsion of the left. She cannot fully abduct her left eye. It appears to be a incomplete 6th nerve palsy. There was no ptosis. There was no fatigable ptosis  Facial sensation:  normal and symmetric  Facial motor: normal and symmetric  Hearing intact  SCM strength intact  Tongue: midline without fasciculations    Motor: Tone normal  Pronator drift was absent    No evidence of fasciculations    Strength testing:   deltoid triceps biceps Wrist ext. Wrist flex. intrinsics Hip flex. Hip ext. Knee ext.   Knee flex Dorsi flex Plantar flex   Right 5 5 5 5 5 5 5 5 5 5 5 5   Left 5 5 5 5 5 5 5 5 5 5 5 5         Sensory:  Upper extremity: intact to pp, light touch, and vibration > 10 seconds  Lower extremity: intact to pp, light touch, and vibration > 10 seconds    Reflexes:    Right Left  Biceps  2 2  Triceps     2 2  Brachiorad. 2 2  Patella  1 1  Achilles 1 1    Plantar response:  flexor bilaterally    Cerebellar testing:  no tremor apparent, finger/nose and rachell were intact    Gait:not assessed due to concerns over safety. Labs:     Lab Results   Component Value Date/Time    Hemoglobin A1c 5.8 (H) 04/07/2015 09:21 AM    Sodium 141 12/09/2022 03:55 PM    Potassium 3.2 (L) 12/09/2022 03:55 PM    Chloride 106 12/09/2022 03:55 PM    Glucose 119 (H) 12/09/2022 03:55 PM    BUN 15 12/09/2022 03:55 PM    Creatinine 1.24 (H) 12/09/2022 03:55 PM    Calcium 8.8 12/09/2022 03:55 PM    WBC 6.9 12/09/2022 03:55 PM    HCT 34.2 (L) 12/09/2022 03:55 PM    HGB 11.4 (L) 12/09/2022 03:55 PM    PLATELET 212 61/72/8099 03:55 PM       Imaging:    No results found for this or any previous visit. Results from [place] encounter on 12/09/22    CTA HEAD NECK W CONT    Narrative  EXAM:  CTA HEAD NECK W CONT    INDICATION:   cva; left eye cn 6 palsy    COMPARISON:  None. CONTRAST:  100 mL of Isovue-370. TECHNIQUE:  Unenhanced  images were obtained to localize the volume for  acquisition. Multislice helical axial CT angiography was performed from the  aortic arch to the top of the head during uneventful rapid bolus intravenous  contrast administration. Coronal and sagittal reformations and 3D/MIP  post  processing were performed. CT dose reduction was achieved through use of a standardized protocol tailored  for this examination and automatic exposure control for dose modulation. This  study was analyzed by the [address] ai algorithm.     FINDINGS:    CTA Head:  Proximal occlusion of the right MCA, likely chronic with extensive small vessel  collaterals formation. There is no evidence of large vessel occlusion or flow-limiting stenosis of the  intracranial internal carotid, anterior cerebral, and right middle cerebral  arteries. Calcification of the bilateral carotid siphons without significant  stenosis. The anterior communicating artery is patent. There is no evidence of large vessel occlusion or flow-limiting stenosis of the  intracranial vertebral arteries, basilar artery, or posterior cerebral arteries. There is no definite posterior communicating arteries identified. There is no evidence of aneurysm or vascular malformation. The dural venous  sinuses and deep cerebral venous system are patent. No evidence of abnormal  enhancement on delayed phase images. Brain parenchyma demonstrates no suggestion of acute infarct. CTA NECK:  NASCET method was utilized for calculating stenosis. The aortic arch is unremarkable. The common carotid arteries demonstrate no  significant stenosis. There is no evidence of significant stenosis in the  cervical right internal carotid artery. There is no evidence of significant  stenosis in the cervical left internal carotid artery. Carotid bulbs plaques  without significant luminal narrowing.  % of right carotid artery stenosis: 0  % of left carotid artery stenosis: 0    There is a codominant vertebrobasilar arterial system. The cervical vertebral  arteries are normal in course, size and contour without significant stenosis. Dental amalgam associated metallic artifact precludes optimal evaluation of the  adjacent structures, oral cavity and oropharynx. Maxillofacial reconstruction  hardware along bilateral anterior maxillary wall. The orbits are bilaterally  unremarkable and without obvious mass lesion. Visualized soft tissues of the  skull base and neck are unremarkable. Visualized paranasal sinuses and mastoids  are patent. Visualized lung apices are clear. No acute fracture or aggressive osseous lesion. Degenerative changes of the  cervical spine. Impression  CTA  1. CTA neck demonstrates no large vessel occlusion, high-grade stenosis or  aneurysm. 2. 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 Findings may represent moyamoya  disease versus chronic atherosclerotic occlusion. Correlate with brain MRI to  exclude acute infarct. 3. No intracranial aneurysm or obvious intraorbital mass lesion. I did independently review the head CT from December 9, 2022. There is no acute abnormality. CTA of the head and neck revealed no large vessel flow-limiting stenosis. There was possible occlusion of the left MCA with small vessel collateralization.   Brain MRI was recommended                 Active Problems:    Cranial nerve VI palsy (12/10/2022)                   Signed By:  Nhung Covarrubias DO FAAN    December 10, 2022

## 2022-12-10 NOTE — PROGRESS NOTES
Speech Pathology Note    SLP orders received. Chart reviewed and spoke with RN. Patient currently headed [facility] for MRI. Will defer SLP evaluation at this time. Note patient passed the [employer] Screen and Easy to Chew/Thin Liquid diet was ordered.      Sandy Mccann M.S., CCC-SLP

## 2022-12-10 NOTE — H&P
History and Physical    Patient: Monica Cutler MRN: [id number]  SSN: [id number]    YOB: 1960  Age: 63 y.o. Sex: female      Subjective:      Monica Cutler is a 63 y.o. female who presents to the emergency department at [facility] with double vision for approximately 48 hours and probable lateral rectus nerve palsy on the left. Patient became concerned after she could not track her left eye outwards and sought medical evaluation. 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 CT of the head revealed no acute intracranial process. Patient complains of a viral  infection with head congestion for approximately 3 weeks which has been going around her house. Unfortunately the children in the home were diagnosed with flu although she remains negative. She also complains of sinus pressure and bilateral painful congestion of the ears. Past Medical History:   Diagnosis Date    Asthma     Heart failure ([address]     Hypercholesteremia     Hypertension     Ill-defined condition     Psoriasis      Past Surgical History:   Procedure Laterality Date    HX OTHER SURGICAL Bilateral     JAW plates    HX OTHER SURGICAL Right     Ankle with screws and plates      Family History   Problem Relation Age of Onset    Heart Disease Father     Diabetes Father     Hypertension Father     Kidney Disease Father     High Cholesterol Father      Social History     Tobacco Use    Smoking status: Former     Types: Cigarettes     Quit date: 5/15/2007     Years since quitting: 15.5    Smokeless tobacco: Never   Substance Use Topics    Alcohol use: No      Prior to Admission medications    Medication Sig Start Date End Date Taking? Authorizing Provider   DULoxetine (CYMBALTA) 30 mg capsule Take 30 mg by mouth daily.     Other, MD Ruy   sulfaSALAzine (AZULFIDINE) 500 mg tablet Take 500 mg by mouth two (2) times a day. Ruy Decker MD   neomycin-colist-hydrocortisone-thonzonium (CORTISPORIN TC,COLY-MYCIN S) otic suspension Administer 3 Drops in left ear four (4) times daily. 5/16/17   Yoandy Luciano MD   cetirizine (ZYRTEC) 10 mg tablet Take 1 Tab by mouth daily. 5/16/17   Yoandy Luciano MD   neomycin-polymyxin-hydrocortisone, buffered, (PEDIOTIC) 3.5-10,000-1 mg/mL-unit/mL-% otic suspension Administer 3 Drops in left ear four (4) times daily. 5/16/17   Yoandy Luciano MD   spironolactone (ALDACTONE) 25 mg tablet Take 25 mg by mouth daily. Ruy Decker MD   furosemide (LASIX) 40 mg tablet Take 40 mg by mouth daily. 1.5 tab in am and 1 tab in afternoon   Indications: CHF    Ruy Decker MD   carvedilol (COREG) 25 mg tablet Take 25 mg by mouth daily. Ruy Decker MD   lisinopril (PRINIVIL, ZESTRIL) 5 mg tablet Take 5 mg by mouth daily. Ruy Decker MD   trospium (SANCTURA XL) 60 mg capsule Take 60 mg by mouth Daily (before breakfast). Ruy Decker MD   aspirin (SHAMAR CHEWABLE ASPIRIN) 81 mg chewable tablet Take 81 mg by mouth nightly. Ruy Decker MD   ondansetron hcl (ZOFRAN, AS HYDROCHLORIDE,) 4 mg tablet Take 4 mg by mouth every twelve (12) hours as needed for Nausea (1-2 tabs as needed for nausea). Ruy Decker MD   nystatin (MYCOSTATIN) 500,000 unit tab Take 2 Tabs by mouth two (2) times a day. Indications: MUCOCUTANEOUS CANDIDIASIS    Ruy Decker MD   ferrous sulfate 324 mg (65 mg iron) tablet Take 130 mg by mouth Daily (before breakfast). Indications: IRON DEFICIENCY ANEMIA    Ruy Decker MD   OMEGA-3/DHA/EPA/FISH OIL (FISH OIL HIGH POTENCY PO) Take 1 Cap by mouth two (2) times a day. Ruy Decker MD   calcium-cholecalciferol, d3, (CALCIUM 600 + D) 600-125 mg-unit tab Take 1 Cap by mouth. Ruy Decker MD   cholecalciferol, vitamin D3, (VITAMIN D3) 2,000 unit tab Take 1 Tab by mouth daily.     Other, MD ARMOND [place], 2 Caps by Does Not Apply route two (2) times a day. Ruy Decker MD   TURMERIC (CURCUMIN) 2 Caps by Does Not Apply route three (3) times daily. Ruy Decker MD   B.ANI/L. ACI/L.SAL/L. PLAN/L.CARRIE (PROBIOTIC FORMULA PO) Take 2 Caps by mouth two (2) times a day. Ruy Decker MD        Allergies   Allergen Reactions    Beef Containing Products Anaphylaxis    Pork/Porcine Containing Products Anaphylaxis    Gold Au 198 Rash       Review of Systems:  Review of Systems   Constitutional:  Positive for malaise/fatigue. Negative for chills and fever. HENT:  Positive for congestion, ear pain and sinus pain. Eyes:  Positive for double vision. Respiratory:  Negative for cough and shortness of breath. Cardiovascular:  Negative for chest pain and leg swelling. Gastrointestinal:  Negative for abdominal pain, nausea and vomiting. Genitourinary: Negative. Musculoskeletal: Negative. Skin: Negative. Neurological: Negative. Psychiatric/Behavioral: Negative. Objective:     Recent Results (from the past 24 hour(s))   CBC WITH AUTOMATED DIFF    Collection Time: 12/09/22  3:55 PM   Result Value Ref Range    WBC 6.9 3.6 - 11.0 K/uL    RBC 3.83 3.80 - 5.20 M/uL    HGB 11.4 (L) 11.5 - 16.0 g/dL    HCT 34.2 (L) 35.0 - 47.0 %    MCV 89.3 80.0 - 99.0 FL    MCH 29.8 26.0 - 34.0 PG    MCHC 33.3 30.0 - 36.5 g/dL    RDW 13.2 11.5 - 14.5 %    PLATELET 288 969 - 048 K/uL    MPV 9.2 8.9 - 12.9 FL    NRBC 0.0 0  WBC    ABSOLUTE NRBC 0.00 0.00 - 0.01 K/uL    NEUTROPHILS 54 32 - 75 %    LYMPHOCYTES 32 12 - 49 %    MONOCYTES 9 5 - 13 %    EOSINOPHILS 5 0 - 7 %    BASOPHILS 0 0 - 1 %    IMMATURE GRANULOCYTES 0 0.0 - 0.5 %    ABS. NEUTROPHILS 3.6 1.8 - 8.0 K/UL    ABS. LYMPHOCYTES 2.2 0.8 - 3.5 K/UL    ABS. MONOCYTES 0.7 0.0 - 1.0 K/UL    ABS. EOSINOPHILS 0.4 0.0 - 0.4 K/UL    ABS. BASOPHILS 0.0 0.0 - 0.1 K/UL    ABS. IMM.  GRANS. 0.0 0.00 - 0.04 K/UL    DF AUTOMATED     METABOLIC PANEL, COMPREHENSIVE    Collection Time: 12/09/22  3:55 PM Result Value Ref Range    Sodium 141 136 - 145 mmol/L    Potassium 3.2 (L) 3.5 - 5.1 mmol/L    Chloride 106 97 - 108 mmol/L    CO2 27 21 - 32 mmol/L    Anion gap 8 5 - 15 mmol/L    Glucose 119 (H) 65 - 100 mg/dL    BUN 15 6 - 20 MG/DL    Creatinine 1.24 (H) 0.55 - 1.02 MG/DL    BUN/Creatinine ratio 12 12 - 20      eGFR 50 (L) >60 ml/min/1.73m2    Calcium 8.8 8.5 - 10.1 MG/DL    Bilirubin, total 0.4 0.2 - 1.0 MG/DL    ALT (SGPT) 23 12 - 78 U/L    AST (SGOT) 16 15 - 37 U/L    Alk. phosphatase 135 (H) 45 - 117 U/L    Protein, total 7.1 6.4 - 8.2 g/dL    Albumin 3.5 3.5 - 5.0 g/dL    Globulin 3.6 2.0 - 4.0 g/dL    A-G Ratio 1.0 (L) 1.1 - 2.2     PROTHROMBIN TIME + INR    Collection Time: 12/09/22  3:55 PM   Result Value Ref Range    INR 1.0 0.9 - 1.1      Prothrombin time 10.1 9.0 - 11.1 sec   TSH 3RD GENERATION    Collection Time: 12/09/22  3:55 PM   Result Value Ref Range    TSH 1.34 0.36 - 3.74 uIU/mL   EKG, 12 LEAD, INITIAL    Collection Time: 12/09/22  4:09 PM   Result Value Ref Range    Ventricular Rate 61 BPM    Atrial Rate 61 BPM    P-R Interval 182 ms    QRS Duration 110 ms    Q-T Interval 460 ms    QTC Calculation (Bezet) 463 ms    Calculated P Axis -26 degrees    Calculated R Axis 36 degrees    Calculated T Axis -48 degrees    Diagnosis       Sinus rhythm with occasional premature ventricular complexes and fusion   complexes  ST & T wave abnormality, consider inferior ischemia  ST & T wave abnormality, consider anterolateral ischemia  Prolonged QT  Abnormal ECG  No previous ECGs available     COVID-19 WITH INFLUENZA A/B    Collection Time: 12/09/22  6:45 PM   Result Value Ref Range    SARS-CoV-2 by PCR Not detected NOTD      Influenza A by PCR Not detected NOTD      Influenza B by PCR Not detected NOTD          No orders to display        Vitals:    12/10/22 0153   Weight: 84.6 kg (186 lb 8 oz)   Height: 5' 6\" (1.676 m)        Physical Exam:  Physical Exam  Vitals reviewed.    HENT:      Head: Normocephalic and atraumatic. Eyes:      Comments: Right eye is normal there is left eye lateral rectus palsy present with the inability to track her eye to the left subjective double vision although patient does have gross visual acuity, normal   Cardiovascular:      Rate and Rhythm: Normal rate and regular rhythm. Heart sounds: Normal heart sounds. Pulmonary:      Effort: Pulmonary effort is normal.      Breath sounds: Normal breath sounds. Abdominal:      General: Abdomen is flat. Bowel sounds are normal.      Palpations: Abdomen is soft. Musculoskeletal:         General: Normal range of motion. Cervical back: Normal range of motion and neck supple. Skin:     General: Skin is warm and dry. Neurological:      Mental Status: She is alert and oriented to person, place, and time. Mental status is at baseline. Comments: Left eye as above   Psychiatric:         Mood and Affect: Mood normal.        Assessment:     Hospital Problems  Date Reviewed: 6/23/2015            Codes Class Noted POA    Cranial nerve VI palsy ICD-10-CM: H49.20  ICD-9-CM: 378.54  12/10/2022 Unknown           Plan:     Impression\plan:    1. Respiratory viral infection  Has been present for approximately 2 weeks with sinus congestion  CT of the head without obvious sinus congestion  Negative for COVID or influenza  Decongestant  CXR    2. 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   Possibly representing moyamoya disease versus chronic atherosclerotic occlusion  CT of the head without acute intracranial process  Telemetry  Lipid panel  Aspirin  Lipitor    3. Essential hypertension  Medication reconciliation to be performed although it appears patient is on Coreg and Lasix  Hydralazine for systolic blood pressure greater 170    4. Depression  Continue Wellbutrin    5. History of Lyme disease  Stable and treated    6.   Bladder spasm  Continue Myrbetriq or equivalent    CODE STATUS: Full code    DVT prophylaxis: SCDs  Ulcer prophylaxis: Not indicated    Time of evaluation 55 minutes    Acacian reconciliation could not be completed due to the patient not knowing her medications    Signed By: Lazaro Killian PA-C     December 10, 2022

## 2022-12-10 NOTE — PROGRESS NOTES
Hospitalist Progress Note    NAME: Pranav Agosto   :  1960   MRN:  [id number]       Assessment / Plan:  . Respiratory viral infection  Has been present for approximately 2 weeks with sinus congestion  CT of the head without obvious sinus congestion  Negative for COVID or influenza  Decongestant ordered   CXR no acute procss     2. 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 Small areas of signal abnormality in the medial left thalamus and caudate/corona radiata inferiorly may represent subacute/chronic infarct particularly given chronic   Appreciate Neuro input  recc lumbar puncture based on MRI findings  Pt continues with headache - Fioricet added   Echocardiogram pending   CTA of the neck demonstrates no large vessel occlusion or high-grade stenosis  CTA of the head demonstrates probable chronic occlusion of the left MCA with formation of small vessel collateralization. Possibly representing moyamoya disease versus chronic atherosclerotic occlusion  CT of the head without acute intracranial process  Lipid panel  Trigly - 348 LDL 56 pt on Atorvastin   Hgb A1c pending      Essential hypertension  - resume Coreg , pt states not taking Lisinopril or Spironolactone   - Hydralazine for systolic blood pressure greater 170     Depression  Continue Wellbutrin      History of Lyme disease  Stable and treated    6. Bladder spasm  Continue Myrbetriq or equivalent       30.0 - 39.9 Obese / Body mass index is 30.1 kg/m². Estimated discharge date:   Barriers: Lumbar puncture     Code status: Full  Prophylaxis:  ASA SCDs  Recommended Disposition: Home w/Family     Subjective:     Chief Complaint / Reason for Physician Visit  \"Pt seen still wearing eye patch complaining of double vision \". Discussed with RN events overnight.      Review of Systems:  Symptom Y/N Comments  Symptom Y/N Comments   Fever/Chills n   Chest Pain n    Poor Appetite    Edema     Cough    Abdominal Pain     Sputum    Joint Pain     SOB/HEMPHILL n   Pruritis/Rash     Nausea/vomit    Tolerating PT/OT     Diarrhea    Tolerating Diet     Constipation    Other       Could NOT obtain due to:      Objective:     VITALS:   Last 24hrs VS reviewed since prior progress note. Most recent are:  Patient Vitals for the past 24 hrs:   Temp Pulse Resp BP SpO2   12/10/22 0256 97.3 °F (36.3 °C) (!) 58 18 (!) 137/50 98 %     No intake or output data in the 24 hours ending 12/10/22 0734     I had a face to face encounter and independently examined this patient on 12/10/2022, as outlined below:  PHYSICAL EXAM:  General: WD, WN. Alert, cooperative, no acute distress    EENT:  diplopia Anicteric sclerae. MMM  Resp:  CTA bilaterally, no wheezing or rales. No accessory muscle use  CV:  Regular  rhythm,  No edema  GI:  Soft, Non distended, Non tender. +Bowel sounds  Neurologic:  Alert and oriented X 3, normal speech,   Psych:   Good insight. Not anxious nor agitated  Skin:  No rashes. No jaundice    Reviewed most current lab test results and cultures  YES  Reviewed most current radiology test results   YES  Review and summation of old records today    NO  Reviewed patient's current orders and MAR    YES  PMH/SH reviewed - no change compared to H&P  ________________________________________________________________________  Care Plan discussed with:    Comments   Patient x    Family      RN x    Care Manager     Consultant                        Multidiciplinary team rounds were held today with , nursing, pharmacist and clinical coordinator. Patient's plan of care was discussed; medications were reviewed and discharge planning was addressed.      ________________________________________________________________________  Total NON critical care TIME:  30   Minutes    Total CRITICAL CARE TIME Spent:   Minutes non procedure based      Comments   >50% of visit spent in counseling and coordination of care     ________________________________________________________________________  Teodoro Scott, NP     Procedures: see electronic medical records for all procedures/Xrays and details which were not copied into this note but were reviewed prior to creation of Plan. LABS:  I reviewed today's most current labs and imaging studies. Pertinent labs include:  Recent Labs     12/09/22  1555   WBC 6.9   HGB 11.4*   HCT 34.2*        Recent Labs     12/09/22  1555      K 3.2*      CO2 27   *   BUN 15   CREA 1.24*   CA 8.8   ALB 3.5   TBILI 0.4   ALT 23   INR 1.0       Signed: Shelia Scott, NP

## 2022-12-10 NOTE — PROGRESS NOTES
Problem: Self Care Deficits Care Plan (Adult)  Goal: *Acute Goals and Plan of Care (Insert Text)  Description: FUNCTIONAL STATUS PRIOR TO ADMISSION: Patient was independent and active without use of DME.     HOME SUPPORT: The patient lived with family but did not require assist.    Occupational Therapy Goals  Initiated 12/10/2022  1. Patient will perform shower transfer with supervision/set-up within 7 day(s). 2.  Patient will perform simple home management with supervision/set-up within 7 day(s). Outcome: Progressing Towards Goal     OCCUPATIONAL THERAPY EVALUATION  Patient: Pranav Agosto (55 y.o. female)  Date: 12/10/2022  Primary Diagnosis: Cranial nerve VI palsy [H49.20]       Precautions: Fall      ASSESSMENT  Based on the objective data described below, the patient presents with decreased ocular motor and reported diplopia. Pt reports the past couple of days diplopia and pressure in head towards L lateral side and behind L eye with ocular ROM. Pt received supine in bed with no family present. Pt reports IND with ADL and has been up to use the toilet since admitted. Pt does report being off balance. Pt with decreased vision, balance, and depth perception. Anticipate pt will discharge home with no further OT needs pending medical workup. Current Level of Function Impacting Discharge (ADLs/self-care): Supervision          Patient will benefit from skilled therapy intervention to address the above noted impairments. PLAN :  Recommendations and Planned Interventions: functional mobility training and visual/perceptual training    Frequency/Duration: Patient will be followed by occupational therapy 1 time a week to address goals. Recommendation for discharge: (in order for the patient to meet his/her long term goals)  No skilled occupational therapy/ follow up rehabilitation needs identified at this time.     This discharge recommendation:  Has not yet been discussed the attending provider and/or case management    IF patient discharges home will need the following DME: none       SUBJECTIVE:   Patient stated It feels like pressure and I keep seeing double .     OBJECTIVE DATA SUMMARY:   HISTORY:   Past Medical History:   Diagnosis Date    Asthma     Heart failure ([address]     Hypercholesteremia     Hypertension     Ill-defined condition     Psoriasis      Past Surgical History:   Procedure Laterality Date    HX OTHER SURGICAL Bilateral     JAW plates    HX OTHER SURGICAL Right     Ankle with screws and plates       Expanded or extensive additional review of patient history:     Home Situation  Home Environment: Private residence  One/Two Story Residence: Two story  Living Alone: No  Support Systems: Spouse/Significant Other  Patient Expects to be Discharged to[de-identified] Home  Current DME Used/Available at Home: None  Tub or Shower Type: Shower    Hand dominance: Right    EXAMINATION OF PERFORMANCE DEFICITS:  Cognitive/Behavioral Status:  Neurologic State: Alert  Orientation Level: Oriented X4  Cognition: Appropriate decision making; Appropriate for age attention/concentration; Appropriate safety awareness; Follows commands  Perception: Appears intact  Perseveration: No perseveration noted  Safety/Judgement: Awareness of environment;Good awareness of safety precautions;Decreased insight into deficits      Hearing: Auditory  Auditory Impairment: None    Vision/Perceptual:      Diplopia: Yes (L lateral rectus weakness)     Pt reports pressure when reaching end points of ocular ROM     Range of Motion:  AROM: Within functional limits  PROM: Within functional limits          Strength:    Strength: Within functional limits                Coordination:  Coordination: Within functional limits  Fine Motor Skills-Upper: Left Intact; Right Intact         Tone & Sensation:  Tone: Normal                         Balance:  Sitting: Intact  Standing: Impaired  Standing - Static: Good  Standing - Dynamic : Fair;Occasional    Functional Mobility and Transfers for ADLs:  Bed Mobility:  Rolling: Independent  Supine to Sit: Independent  Sit to Supine: Independent  Scooting: Independent    Transfers:  Sit to Stand: Independent  Stand to Sit: Independent  Bed to Chair: Supervision    ADL Assessment:       Oral Facial Hygiene/Grooming: Independent              Upper Body Dressing: Independent    Lower Body Dressing: Independent    Toileting: Independent                  ADL Intervention and task modifications:            Cognitive Retraining  Safety/Judgement: Awareness of environment;Good awareness of safety precautions;Decreased insight into deficits      Occupational Therapy Evaluation Charge Determination   History Examination Decision-Making   LOW Complexity : Brief history review  LOW Complexity : 1-3 performance deficits relating to physical, cognitive , or psychosocial skils that result in activity limitations and / or participation restrictions  LOW Complexity : No comorbidities that affect functional and no verbal or physical assistance needed to complete eval tasks       Based on the above components, the patient evaluation is determined to be of the following complexity level: LOW     Activity Tolerance: WNL    After treatment patient left in no apparent distress:    Supine in bed    COMMUNICATION/EDUCATION:   The patients plan of care was discussed with: Physical therapist.     Home safety education was provided and the patient/caregiver indicated understanding. This patients plan of care is appropriate for delegation to \[facility]\"".     Thank you for this referral.  Shelia Ponce OT  Time Calculation: 9 mins

## 2022-12-10 NOTE — PROGRESS NOTES
-MRI pending completion of MRI Safety Screening Form. Screening form needs signatures.  -Patient cannot be scanned until this form is completed. - CALL MRI when this has been successfully completed at [phone].

## 2022-12-10 NOTE — PROGRESS NOTES
Pt arrived from OSH at approx 0000. Pt is A&OX4, ambulatory, VS stable. Provider notified of arrival. RN notified that there are no telemetry boxes available but when one is available RN will be notified. Awaiting further orders at this time. Pt is resting comfortably, Call bell in reach.

## 2022-12-11 ENCOUNTER — APPOINTMENT (OUTPATIENT)
Dept: MRI IMAGING | Age: 62
DRG: 048 | End: 2022-12-11
Attending: PSYCHIATRY & NEUROLOGY
Payer: MEDICAID

## 2022-12-11 ENCOUNTER — APPOINTMENT (OUTPATIENT)
Dept: GENERAL RADIOLOGY | Age: 62
DRG: 048 | End: 2022-12-11
Attending: PSYCHIATRY & NEUROLOGY
Payer: MEDICAID

## 2022-12-11 LAB
ALBUMIN SERPL-MCNC: 3.3 G/DL (ref 3.5–5)
ALBUMIN/GLOB SERPL: 0.9 {RATIO} (ref 1.1–2.2)
ALP SERPL-CCNC: 129 U/L (ref 45–117)
ALT SERPL-CCNC: 24 U/L (ref 12–78)
ANION GAP SERPL CALC-SCNC: 9 MMOL/L (ref 5–15)
AST SERPL-CCNC: 13 U/L (ref 15–37)
ATRIAL RATE: 61 BPM
BASOPHILS # BLD: 0 K/UL (ref 0–0.1)
BASOPHILS NFR BLD: 0 % (ref 0–1)
BILIRUB SERPL-MCNC: 0.5 MG/DL (ref 0.2–1)
BUN SERPL-MCNC: 16 MG/DL (ref 6–20)
BUN/CREAT SERPL: 17 (ref 12–20)
CALCIUM SERPL-MCNC: 9.2 MG/DL (ref 8.5–10.1)
CALCULATED P AXIS, ECG09: -26 DEGREES
CALCULATED R AXIS, ECG10: 36 DEGREES
CALCULATED T AXIS, ECG11: -48 DEGREES
CHLORIDE SERPL-SCNC: 105 MMOL/L (ref 97–108)
CO2 SERPL-SCNC: 28 MMOL/L (ref 21–32)
CREAT SERPL-MCNC: 0.96 MG/DL (ref 0.55–1.02)
DIAGNOSIS, 93000: NORMAL
DIFFERENTIAL METHOD BLD: NORMAL
EOSINOPHIL # BLD: 0.4 K/UL (ref 0–0.4)
EOSINOPHIL NFR BLD: 7 % (ref 0–7)
ERYTHROCYTE [DISTWIDTH] IN BLOOD BY AUTOMATED COUNT: 13.2 % (ref 11.5–14.5)
EST. AVERAGE GLUCOSE BLD GHB EST-MCNC: 117 MG/DL
GLOBULIN SER CALC-MCNC: 3.7 G/DL (ref 2–4)
GLUCOSE SERPL-MCNC: 117 MG/DL (ref 65–100)
HBA1C MFR BLD: 5.7 % (ref 4–5.6)
HCT VFR BLD AUTO: 37.3 % (ref 35–47)
HGB BLD-MCNC: 12.2 G/DL (ref 11.5–16)
IMM GRANULOCYTES # BLD AUTO: 0 K/UL (ref 0–0.04)
IMM GRANULOCYTES NFR BLD AUTO: 0 % (ref 0–0.5)
LYMPHOCYTES # BLD: 1.8 K/UL (ref 0.8–3.5)
LYMPHOCYTES NFR BLD: 31 % (ref 12–49)
MCH RBC QN AUTO: 30.3 PG (ref 26–34)
MCHC RBC AUTO-ENTMCNC: 32.7 G/DL (ref 30–36.5)
MCV RBC AUTO: 92.6 FL (ref 80–99)
MONOCYTES # BLD: 0.5 K/UL (ref 0–1)
MONOCYTES NFR BLD: 8 % (ref 5–13)
NEUTS SEG # BLD: 3.2 K/UL (ref 1.8–8)
NEUTS SEG NFR BLD: 54 % (ref 32–75)
NRBC # BLD: 0 K/UL (ref 0–0.01)
NRBC BLD-RTO: 0 PER 100 WBC
P-R INTERVAL, ECG05: 182 MS
PLATELET # BLD AUTO: 282 K/UL (ref 150–400)
PMV BLD AUTO: 9.4 FL (ref 8.9–12.9)
POTASSIUM SERPL-SCNC: 3.7 MMOL/L (ref 3.5–5.1)
PROT SERPL-MCNC: 7 G/DL (ref 6.4–8.2)
Q-T INTERVAL, ECG07: 460 MS
QRS DURATION, ECG06: 110 MS
QTC CALCULATION (BEZET), ECG08: 463 MS
RBC # BLD AUTO: 4.03 M/UL (ref 3.8–5.2)
SODIUM SERPL-SCNC: 142 MMOL/L (ref 136–145)
VENTRICULAR RATE, ECG03: 61 BPM
WBC # BLD AUTO: 5.9 K/UL (ref 3.6–11)

## 2022-12-11 PROCEDURE — 74011250637 HC RX REV CODE- 250/637: Performed by: PHYSICIAN ASSISTANT

## 2022-12-11 PROCEDURE — 86695 HERPES SIMPLEX TYPE 1 TEST: CPT

## 2022-12-11 PROCEDURE — 99233 SBSQ HOSP IP/OBS HIGH 50: CPT | Performed by: PSYCHIATRY & NEUROLOGY

## 2022-12-11 PROCEDURE — 87205 SMEAR GRAM STAIN: CPT

## 2022-12-11 PROCEDURE — 74011250637 HC RX REV CODE- 250/637: Performed by: NURSE PRACTITIONER

## 2022-12-11 PROCEDURE — 82042 OTHER SOURCE ALBUMIN QUAN EA: CPT

## 2022-12-11 PROCEDURE — 84157 ASSAY OF PROTEIN OTHER: CPT

## 2022-12-11 PROCEDURE — 36415 COLL VENOUS BLD VENIPUNCTURE: CPT

## 2022-12-11 PROCEDURE — 83873 ASSAY OF CSF PROTEIN: CPT

## 2022-12-11 PROCEDURE — 85025 COMPLETE CBC W/AUTO DIFF WBC: CPT

## 2022-12-11 PROCEDURE — 82945 GLUCOSE OTHER FLUID: CPT

## 2022-12-11 PROCEDURE — 80053 COMPREHEN METABOLIC PANEL: CPT

## 2022-12-11 PROCEDURE — 70553 MRI BRAIN STEM W/O & W/DYE: CPT

## 2022-12-11 PROCEDURE — 89050 BODY FLUID CELL COUNT: CPT

## 2022-12-11 PROCEDURE — 74011000250 HC RX REV CODE- 250: Performed by: PHYSICIAN ASSISTANT

## 2022-12-11 PROCEDURE — A9576 INJ PROHANCE MULTIPACK: HCPCS | Performed by: INTERNAL MEDICINE

## 2022-12-11 PROCEDURE — 86617 LYME DISEASE ANTIBODY: CPT

## 2022-12-11 PROCEDURE — 83036 HEMOGLOBIN GLYCOSYLATED A1C: CPT

## 2022-12-11 PROCEDURE — 65270000046 HC RM TELEMETRY

## 2022-12-11 PROCEDURE — 74011250636 HC RX REV CODE- 250/636: Performed by: INTERNAL MEDICINE

## 2022-12-11 RX ORDER — HYDROMORPHONE HYDROCHLORIDE 2 MG/1
1 TABLET ORAL
Status: DISCONTINUED | OUTPATIENT
Start: 2022-12-11 | End: 2022-12-15 | Stop reason: HOSPADM

## 2022-12-11 RX ADMIN — BUTALBITAL, ACETAMINOPHEN, AND CAFFEINE 1 TABLET: 50; 325; 40 TABLET ORAL at 07:27

## 2022-12-11 RX ADMIN — GUAIFENESIN 600 MG: 600 TABLET, EXTENDED RELEASE ORAL at 21:44

## 2022-12-11 RX ADMIN — HYDROMORPHONE HYDROCHLORIDE 1 MG: 2 TABLET ORAL at 21:45

## 2022-12-11 RX ADMIN — CETIRIZINE HYDROCHLORIDE 10 MG: 10 TABLET, FILM COATED ORAL at 10:23

## 2022-12-11 RX ADMIN — TROSPIUM CHLORIDE 20 MG: 20 TABLET, FILM COATED ORAL at 10:23

## 2022-12-11 RX ADMIN — MELATONIN 3 MG: at 21:44

## 2022-12-11 RX ADMIN — DULOXETINE HYDROCHLORIDE 60 MG: 30 CAPSULE, DELAYED RELEASE ORAL at 10:18

## 2022-12-11 RX ADMIN — SODIUM CHLORIDE, PRESERVATIVE FREE 10 ML: 5 INJECTION INTRAVENOUS at 21:48

## 2022-12-11 RX ADMIN — ATORVASTATIN CALCIUM 40 MG: 40 TABLET, FILM COATED ORAL at 10:18

## 2022-12-11 RX ADMIN — GUAIFENESIN 600 MG: 600 TABLET, EXTENDED RELEASE ORAL at 10:18

## 2022-12-11 RX ADMIN — SODIUM CHLORIDE, PRESERVATIVE FREE 10 ML: 5 INJECTION INTRAVENOUS at 18:17

## 2022-12-11 RX ADMIN — SODIUM CHLORIDE, PRESERVATIVE FREE 10 ML: 5 INJECTION INTRAVENOUS at 05:40

## 2022-12-11 RX ADMIN — ASPIRIN 325 MG ORAL TABLET 325 MG: 325 PILL ORAL at 10:18

## 2022-12-11 RX ADMIN — FUROSEMIDE 40 MG: 40 TABLET ORAL at 10:18

## 2022-12-11 RX ADMIN — TROSPIUM CHLORIDE 20 MG: 20 TABLET, FILM COATED ORAL at 18:16

## 2022-12-11 RX ADMIN — BUTALBITAL, ACETAMINOPHEN, AND CAFFEINE 1 TABLET: 50; 325; 40 TABLET ORAL at 23:23

## 2022-12-11 RX ADMIN — DULOXETINE HYDROCHLORIDE 60 MG: 30 CAPSULE, DELAYED RELEASE ORAL at 18:16

## 2022-12-11 RX ADMIN — GADOTERIDOL 15 ML: 279.3 INJECTION, SOLUTION INTRAVENOUS at 14:10

## 2022-12-11 RX ADMIN — BUPROPION HYDROCHLORIDE 150 MG: 150 TABLET, EXTENDED RELEASE ORAL at 10:18

## 2022-12-11 RX ADMIN — CARVEDILOL 25 MG: 12.5 TABLET, FILM COATED ORAL at 10:18

## 2022-12-11 RX ADMIN — HYDROMORPHONE HYDROCHLORIDE 1 MG: 2 TABLET ORAL at 14:52

## 2022-12-11 NOTE — PROGRESS NOTES
Bedside and Verbal shift change report given to [address] (oncoming nurse) by Adam Salcido RN (offgoing nurse). Report included the following information Plan of care, SBAR and Kardex.

## 2022-12-11 NOTE — PROGRESS NOTES
Hospitalist Progress Note    NAME: Dylan Titus   :  1960   MRN:  [id number]       Assessment / Plan:  1400 pt requesting additional pain medications states fiorcet not helpful   Patient for lumbar puncture to rule out thyroid component of left palsy. Power Serna Respiratory viral infection  Has been present for approximately 2 weeks with sinus congestion  CT of the head without obvious sinus congestion  Negative for COVID or influenza  Decongestant ordered   CXR no acute procss     2. 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 Possibly representing moyamoya disease versus chronic atherosclerotic occlusion  CT of the head without acute intracranial process  Lipid panel  Trigly - 348 LDL 56 pt on Atorvastin   Hgb A1c 5.7      Essential hypertension  - resume Coreg , pt states not taking Lisinopril or Spironolactone   - Hydralazine for systolic blood pressure greater 170  - Blood pressures improved systolic blood pressure 248I overnight  -Continue with Coreg for now     Depression  Continue Wellbutrin      History of Lyme disease  Stable and treated    6. Bladder spasm  Continue Myrbetriq or equivalent       30.0 - 39.9 Obese / Body mass index is 30.1 kg/m².     Estimated discharge date:   Barriers: Lumbar puncture     Code status: Full  Prophylaxis:  ASA SCDs  Recommended Disposition: Home w/Family Subjective:     Chief Complaint / Reason for Physician Visit  \"Pt seen states head pressure and vice  feeling around head improving with decongestant as well as Fioricet\". Discussed with RN events overnight. Review of Systems:  Symptom Y/N Comments  Symptom Y/N Comments   Fever/Chills n   Chest Pain n    Poor Appetite    Edema     Cough    Abdominal Pain     Sputum    Joint Pain     SOB/HEMPHILL n   Pruritis/Rash     Nausea/vomit    Tolerating PT/OT     Diarrhea    Tolerating Diet y    Constipation    Other headache y      Could NOT obtain due to:      Objective:     VITALS:   Last 24hrs VS reviewed since prior progress note. Most recent are:  Patient Vitals for the past 24 hrs:   Temp Pulse Resp BP SpO2   12/11/22 0826 98.6 °F (37 °C) 69 17 (!) 147/78 98 %   12/11/22 0342 97.3 °F (36.3 °C) 62 16 (!) 140/73 95 %   12/11/22 0049 97.9 °F (36.6 °C) 66 16 (!) 146/71 97 %   12/10/22 2058 97.9 °F (36.6 °C) 66 18 (!) 149/67 97 %   12/10/22 1629 -- 70 -- (!) 158/81 --   12/10/22 1626 98.6 °F (37 °C) 67 -- (!) 163/69 97 %   12/10/22 1219 97.9 °F (36.6 °C) 68 -- (!) 137/50 99 %   12/10/22 1200 -- 73 -- -- --       Intake/Output Summary (Last 24 hours) at 12/11/2022 0849  Last data filed at 12/10/2022 2058  Gross per 24 hour   Intake 240 ml   Output --   Net 240 ml        I had a face to face encounter and independently examined this patient on 12/11/2022, as outlined below:  PHYSICAL EXAM:  General: WD, WN. Alert, cooperative, no acute distress    EENT:  diplopia Anicteric sclerae. MMM  Resp:  CTA bilaterally, no wheezing or rales. No accessory muscle use  CV:  Regular  rhythm,  No edema  GI:  Soft, Non distended, Non tender. +Bowel sounds  Neurologic:  Alert and oriented X 3, normal speech,   Psych:   Good insight. Not anxious nor agitated  Skin:  No rashes.   No jaundice    Reviewed most current lab test results and cultures  YES  Reviewed most current radiology test results   YES  Review and summation of old records today    NO  Reviewed patient's current orders and MAR    YES  PMH/SH reviewed - no change compared to H&P  ________________________________________________________________________  Care Plan discussed with:    Comments   Patient x    Family      RN x    Care Manager     Consultant                        Multidiciplinary team rounds were held today with , nursing, pharmacist and clinical coordinator. Patient's plan of care was discussed; medications were reviewed and discharge planning was addressed. ________________________________________________________________________  Total NON critical care TIME:  30   Minutes    Total CRITICAL CARE TIME Spent:   Minutes non procedure based      Comments   >50% of visit spent in counseling and coordination of care     ________________________________________________________________________  Rexann Hunger. Timothy Matson NP     Procedures: see electronic medical records for all procedures/Xrays and details which were not copied into this note but were reviewed prior to creation of Plan. LABS:  I reviewed today's most current labs and imaging studies. Pertinent labs include:  Recent Labs     12/11/22  0400 12/09/22  1555   WBC 5.9 6.9   HGB 12.2 11.4*   HCT 37.3 34.2*    299     Recent Labs     12/11/22  0400 12/09/22  1555    141   K 3.7 3.2*    106   CO2 28 27   * 119*   BUN 16 15   CREA 0.96 1.24*   CA 9.2 8.8   ALB 3.3* 3.5   TBILI 0.5 0.4   ALT 24 23   INR  --  1.0       Signed: Shelia Matson, NP

## 2022-12-11 NOTE — PROGRESS NOTES
Bedside and Verbal shift change report given to Tracy Vazquez RN (oncoming nurse) by Tres Juares RN (offgoing nurse). Report included the following information SBAR and Kardex.

## 2022-12-11 NOTE — PROGRESS NOTES
Neurology Note    Patient ID:  Jahaira Lock  [id number]  48 y.o.  1960      Date of Consultation:  December 11, 2022      Assessment and Plan:    The patient is a 28-year-old female with a subacute onset of diplopia after developing a recent viral illness. 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 Diplopia:    Differential includes ischemic, infectious, autoimmune  Brain MRI with no obvious etiology. MRI negative for an acute stroke. Subtle abnormality with was felt to be possibly related to a chronic ischemia. Atypical pattern. The patient does need a follow-up brain MRI with contrast.  This order has been placed  The patient should receive a lumbar puncture. I had discussed this with the patient. We discussed this again today. After reviewing the risks and benefits with the patient and given her medical conditions and body habitus, it was decided that this should be performed under fluoroscopy. The order was placed with appropriate labs. Initial head CT with no abnormality. CTA with no aneursymal compression  serology looking for possible metabolic or autoimmune etiologies pending. She will continue to wear the eye patch for the time being. Htn: maintain sbp < 140  Continue aspirin 81 mg. Hemoglobin a1c was at goal - 5.7  Ldl: 56, triglycerides  348      Neurological continue to follow along closely            Subjective: my vision is still double     History of Present Illness:   Jahaira Lock is a 63 y.o. female with a history of hypertension and dyslipidemia who presented to the emergency department due to double vision . The patient states that her family has all had a recent viral illness and she began to develop head congestion and a cough within the past 2 weeks. She reports that she was coughing so much that she was losing her voice. She continued to develop a increasing pressure in her head that was bifrontal in nature.   She then began noticing that she was developing double vision. Initially she felt the objects were horizontal but has noticed more of a skew double vision over the past 48 hours. She was out to lunch with a friend who noticed that her 1 eye was not moving and conjugate with the others and ultimately it was decided that she should seek medical attention. Since admission, there has been no worsening nor improvement in her symptoms. She initially went to [facility] where she did have a CT of her head and a CTA with no clear etiology. The patient does continue to struggle with her diplopia. She does need to keep her eye patch on.       Past Medical History:   Diagnosis Date    Asthma     Heart failure ([address]     Hypercholesteremia     Hypertension     Ill-defined condition     Psoriasis    The patient reports of having had a history of Lyme disease in the past that was quite severe is causing her to be wheelchair-bound for an extended period of time    Past Surgical History:   Procedure Laterality Date    HX OTHER SURGICAL Bilateral     JAW plates    HX OTHER SURGICAL Right     Ankle with screws and plates        Family History   Problem Relation Age of Onset    Heart Disease Father     Diabetes Father     Hypertension Father     Kidney Disease Father     High Cholesterol Father         Social History     Tobacco Use    Smoking status: Former     Types: Cigarettes     Quit date: 5/15/2007     Years since quitting: 15.5    Smokeless tobacco: Never   Substance Use Topics    Alcohol use: No        Allergies   Allergen Reactions    Beef Containing Products Anaphylaxis    Pork/Porcine Containing Products Anaphylaxis    Gold Au 198 Rash          Current Facility-Administered Medications   Medication Dose Route Frequency    aspirin tablet 325 mg  325 mg Oral DAILY    atorvastatin (LIPITOR) tablet 40 mg  40 mg Oral DAILY    sodium chloride (NS) flush 5-40 mL  5-40 mL IntraVENous Q8H    sodium chloride (NS) flush 5-40 mL  5-40 mL IntraVENous PRN    acetaminophen (TYLENOL) tablet 650 mg  650 mg Oral Q6H PRN    Or    acetaminophen (TYLENOL) suppository 650 mg  650 mg Rectal Q6H PRN    polyethylene glycol (MIRALAX) packet 17 g  17 g Oral DAILY PRN    ondansetron (ZOFRAN ODT) tablet 4 mg  4 mg Oral Q8H PRN    Or    ondansetron (ZOFRAN) injection 4 mg  4 mg IntraVENous Q6H PRN    buPROPion ER (ZYBAN,BUPROBAN) tablet 150 mg  150 mg Oral DAILY    cetirizine (ZYRTEC) tablet 10 mg  10 mg Oral DAILY    DULoxetine (CYMBALTA) capsule 60 mg  60 mg Oral BID    furosemide (LASIX) tablet 40 mg  40 mg Oral DAILY    trospium (SANCTURA) tablet 20 mg  20 mg Oral BID    hydrOXYzine HCL (ATARAX) tablet 25 mg  25 mg Oral TID PRN    hydrALAZINE (APRESOLINE) 20 mg/mL injection 20 mg  20 mg IntraVENous Q6H PRN    melatonin tablet 3 mg  3 mg Oral QHS PRN    butalbital-acetaminophen-caffeine (FIORICET, ESGIC) -40 mg per tablet 1 Tablet  1 Tablet Oral Q4H PRN    guaiFENesin ER (MUCINEX) tablet 600 mg  600 mg Oral Q12H    carvediloL (COREG) tablet 25 mg  25 mg Oral DAILY       Review of Systems:    General, constitutional:head congestion  Eyes, vision: negative  Ears, nose, throat: negative  Cardiovascular, heart: negative  Respiratory: negative  Gastrointestinal: negative  Genitourinary: negative  Musculoskeletal: negative  Skin and integumentary: negative  Psychiatric: negative  Endocrine: negative  Neurological: negative, except for HPI  Hematologic/lymphatic: negative  Allergy/immunology: negative    Objective:     Visit Vitals  BP (!) 147/78   Pulse 69   Temp 98.6 °F (37 °C)   Resp 17   Ht 5' 6\" (1.676 m)   Wt 186 lb 8 oz (84.6 kg)   SpO2 98%   BMI 30.10 kg/m²       Physical Exam:    General:  appears well nourished in no acute distress  Neck: no carotid bruits  Lungs: clear to auscultation  Heart:  no murmurs, regular rate  Lower extremity: peripheral pulses palpable and no edema  Skin: intact    Neurological exam:    Awake, alert, oriented to person, place and time  Recent and remote memory were normal  Attention and concentration were intact  Language was intact. There was no aphasia  Speech: no dysarthria  Fund of knowledge was preserved    Cranial nerves:   II-XII were tested    Perrrla  Visual fields were full  She does have a disconjugate primary gaze with intorsion of the left. She cannot fully abduct her left eye. It appears to be a incomplete 6th nerve palsy. persisting  There was no ptosis. There was no fatigable ptosis  Facial sensation:  normal and symmetric  Facial motor: normal and symmetric  Hearing intact  SCM strength intact  Tongue: midline without fasciculations    Motor: Tone normal  Pronator drift was absent    No evidence of fasciculations    Strength testing:   deltoid triceps biceps Wrist ext. Wrist flex. intrinsics Hip flex. Hip ext. Knee ext. Knee flex Dorsi flex Plantar flex   Right 5 5 5 5 5 5 5 5 5 5 5 5   Left 5 5 5 5 5 5 5 5 5 5 5 5       Sensory:  Upper extremity: intact to pp,  Lower extremity: intact to pp,    Reflexes:    Right Left  Biceps  2 2  Triceps     2 2  Brachiorad. 2 2  Patella  1 1  Achilles 1 1  Cerebellar testing:  no tremor apparent, finger/nose and rachell were intact    Gait:not assessed due to concerns over safety.     Labs:     Lab Results   Component Value Date/Time    Hemoglobin A1c 5.7 (H) 12/11/2022 04:00 AM    Sodium 142 12/11/2022 04:00 AM    Potassium 3.7 12/11/2022 04:00 AM    Chloride 105 12/11/2022 04:00 AM    Glucose 117 (H) 12/11/2022 04:00 AM    BUN 16 12/11/2022 04:00 AM    Creatinine 0.96 12/11/2022 04:00 AM    Calcium 9.2 12/11/2022 04:00 AM    WBC 5.9 12/11/2022 04:00 AM    HCT 37.3 12/11/2022 04:00 AM    HGB 12.2 12/11/2022 04:00 AM    PLATELET 863 27/33/6004 04:00 AM       Imaging:    Results from Hospital Encounter encounter on 12/10/22    MRI BRAIN WO CONT    Narrative  INDICATION:   cva    EXAMINATION:  MRI BRAIN WO CONTRAST    COMPARISON:  12/9/2022    TECHNIQUE:  Multiplanar multisequence acquisition without contrast of the brain. FINDINGS:    Ventricles:  Midline, no hydrocephalus. Brain Parenchyma/Brainstem:  Several small areas of FLAIR/T2 hyperintense signal  in the medial left thalamus and adjacent to the left lateral wall of the third  ventricle and frontal horn of the lateral ventricle. Signal is slightly less  intense on T2 than adjacent CSF. No acute infarction. Intracranial Hemorrhage:  None. Basal Cisterns:  Normal.  Flow Voids:  Normal.  Additional Comments:  Bilateral mastoid effusions. Impression  No acute infarction. Small areas of signal abnormality in the medial left  thalamus and caudate/corona radiata inferiorly may represent subacute/chronic  infarct particularly given chronic appearing middle cerebral artery occlusion on  CTA, although slightly unusual appearance. Consider thin slice MP rage  postcontrast sequences as follow-up. Results from [place] encounter on 12/09/22    CTA HEAD NECK W CONT    Narrative  EXAM:  CTA HEAD NECK W CONT    INDICATION:   cva; left eye cn 6 palsy    COMPARISON:  None. CONTRAST:  100 mL of Isovue-370. TECHNIQUE:  Unenhanced  images were obtained to localize the volume for  acquisition. Multislice helical axial CT angiography was performed from the  aortic arch to the top of the head during uneventful rapid bolus intravenous  contrast administration. Coronal and sagittal reformations and 3D/MIP  post  processing were performed. CT dose reduction was achieved through use of a standardized protocol tailored  for this examination and automatic exposure control for dose modulation. This  study was analyzed by the [address] ai algorithm. FINDINGS:    CTA Head:  Proximal occlusion of the right MCA, likely chronic with extensive small vessel  collaterals formation.   There is no evidence of large vessel occlusion or flow-limiting stenosis of the  intracranial internal carotid, anterior cerebral, and right middle cerebral  arteries. Calcification of the bilateral carotid siphons without significant  stenosis. The anterior communicating artery is patent. There is no evidence of large vessel occlusion or flow-limiting stenosis of the  intracranial vertebral arteries, basilar artery, or posterior cerebral arteries. There is no definite posterior communicating arteries identified. There is no evidence of aneurysm or vascular malformation. The dural venous  sinuses and deep cerebral venous system are patent. No evidence of abnormal  enhancement on delayed phase images. Brain parenchyma demonstrates no suggestion of acute infarct. CTA NECK:  NASCET method was utilized for calculating stenosis. The aortic arch is unremarkable. The common carotid arteries demonstrate no  significant stenosis. There is no evidence of significant stenosis in the  cervical right internal carotid artery. There is no evidence of significant  stenosis in the cervical left internal carotid artery. Carotid bulbs plaques  without significant luminal narrowing.  % of right carotid artery stenosis: 0  % of left carotid artery stenosis: 0    There is a codominant vertebrobasilar arterial system. The cervical vertebral  arteries are normal in course, size and contour without significant stenosis. Dental amalgam associated metallic artifact precludes optimal evaluation of the  adjacent structures, oral cavity and oropharynx. Maxillofacial reconstruction  hardware along bilateral anterior maxillary wall. The orbits are bilaterally  unremarkable and without obvious mass lesion. Visualized soft tissues of the  skull base and neck are unremarkable. Visualized paranasal sinuses and mastoids  are patent. Visualized lung apices are clear. No acute fracture or aggressive osseous lesion. Degenerative changes of the  cervical spine. Impression  CTA  1. CTA neck demonstrates no large vessel occlusion, high-grade stenosis or  aneurysm.   2. 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 Findings may represent moyamoya  disease versus chronic atherosclerotic occlusion. Correlate with brain MRI to  exclude acute infarct. 3. No intracranial aneurysm or obvious intraorbital mass lesion. head CT from December 9, 2022. There is no acute abnormality. CTA of the head and neck revealed no large vessel flow-limiting stenosis. There was possible occlusion of the left MCA with small vessel collateralization. Brain MRI was recommended    I did independently review the brain MRI from December 10, 2022. There is no clear evidence of an acute stroke. There is subtle signal abnormality in the left thalamus which may resent a subacute to chronic stroke. I spent  40   minutes providing care to this  acutely ill inpatient with > 50% of the time counseling and assisting in the coordination of care of the patient on the patient's hospital floor/unit.          Active Problems:    Cranial nerve VI palsy (12/10/2022)                 Signed By:  Andrew Fontana DO FAAN    December 11, 2022

## 2022-12-11 NOTE — PROGRESS NOTES
Transition of Care Plan  RUR: 7%  DISPOSITION: The disposition plan is home with family assistance and outpatient PT/OT  F/U with PCP/Specialist    Transport: family         Reason for Admission:  cranial nerve VI palsy                      RUR Score:    7%                 Plan for utilizing home health:      not recommended     PCP: First and Last name:  OtherRuy MD     Name of Practice:    Are you a current patient: Yes/No:    Approximate date of last visit:    Can you participate in a virtual visit with your PCP:                     Current Advanced Directive/Advance Care Plan: Full Code      Healthcare Decision Maker:   Click here to complete [address] including selection of the Healthcare Decision Maker Relationship (ie \"Primary\")                             Transition of Care Plan:                      Patient lives with her  in a home with no steps to enter. Patient is independent in her ADLs/IADLs and does not have any DME. Care Management Interventions  PCP Verified by CM: Yes  Palliative Care Criteria Met (RRAT>21 & CHF Dx)?: No  Mode of Transport at Discharge:  Other (see comment) (family)  Transition of Care Consult (CM Consult): Discharge Planning  MyChart Signup: No  Discharge Durable Medical Equipment: No  Health Maintenance Reviewed: Yes  Physical Therapy Consult: Yes  Occupational Therapy Consult: Yes  Speech Therapy Consult: No  Support Systems: Spouse/Significant Other  Confirm Follow Up Transport: Family  [employer] Information Provided?: No  Discharge Location  Patient Expects to be Discharged to[de-identified] Home with family assistance    11:55 AM  LISA Roy

## 2022-12-12 ENCOUNTER — APPOINTMENT (OUTPATIENT)
Dept: GENERAL RADIOLOGY | Age: 62
DRG: 048 | End: 2022-12-12
Attending: PSYCHIATRY & NEUROLOGY
Payer: MEDICAID

## 2022-12-12 ENCOUNTER — APPOINTMENT (OUTPATIENT)
Dept: NON INVASIVE DIAGNOSTICS | Age: 62
DRG: 048 | End: 2022-12-12
Attending: PHYSICIAN ASSISTANT
Payer: MEDICAID

## 2022-12-12 ENCOUNTER — TELEPHONE (OUTPATIENT)
Dept: NEUROLOGY | Age: 62
End: 2022-12-12

## 2022-12-12 LAB
ACE SERPL-CCNC: 39 U/L (ref 14–82)
ALBUMIN SERPL-MCNC: 3.5 G/DL (ref 3.5–5)
ALBUMIN/GLOB SERPL: 0.9 {RATIO} (ref 1.1–2.2)
ALP SERPL-CCNC: 136 U/L (ref 45–117)
ALT SERPL-CCNC: 25 U/L (ref 12–78)
ANION GAP SERPL CALC-SCNC: 9 MMOL/L (ref 5–15)
APPEARANCE CSF: CLEAR
APPEARANCE CSF: CLEAR
AST SERPL-CCNC: 14 U/L (ref 15–37)
BASOPHILS # BLD: 0 K/UL (ref 0–0.1)
BASOPHILS NFR BLD: 0 % (ref 0–1)
BILIRUB SERPL-MCNC: 0.5 MG/DL (ref 0.2–1)
BUN SERPL-MCNC: 20 MG/DL (ref 6–20)
BUN/CREAT SERPL: 17 (ref 12–20)
CALCIUM SERPL-MCNC: 9.2 MG/DL (ref 8.5–10.1)
CENTROMERE B AB SER-ACNC: <0.2 AI (ref 0–0.9)
CHLORIDE SERPL-SCNC: 101 MMOL/L (ref 97–108)
CHROMATIN AB SERPL-ACNC: 0.2 AI (ref 0–0.9)
CO2 SERPL-SCNC: 30 MMOL/L (ref 21–32)
COLOR CSF: COLORLESS
COLOR CSF: COLORLESS
CREAT SERPL-MCNC: 1.21 MG/DL (ref 0.55–1.02)
DIFFERENTIAL METHOD BLD: ABNORMAL
DSDNA AB SER-ACNC: 3 IU/ML (ref 0–9)
ECHO AR MAX VEL PISA: 2.3 M/S
ECHO AV MEAN GRADIENT: 5 MMHG
ECHO AV MEAN VELOCITY: 1.1 M/S
ECHO AV PEAK GRADIENT: 11 MMHG
ECHO AV PEAK VELOCITY: 1.7 M/S
ECHO AV REGURGITANT PHT: 881 MILLISECOND
ECHO AV VTI: 27.4 CM
ECHO LA DIAMETER INDEX: 2.16 CM/M2
ECHO LA DIAMETER: 4.2 CM
ECHO LV FRACTIONAL SHORTENING: 23 % (ref 28–44)
ECHO LV INTERNAL DIMENSION DIASTOLE INDEX: 2.89 CM/M2
ECHO LV INTERNAL DIMENSION DIASTOLIC MMODE: 4.9 CM (ref 3.9–5.3)
ECHO LV INTERNAL DIMENSION DIASTOLIC: 5.6 CM (ref 3.9–5.3)
ECHO LV INTERNAL DIMENSION SYSTOLIC INDEX: 2.22 CM/M2
ECHO LV INTERNAL DIMENSION SYSTOLIC MMODE: 3.8 CM
ECHO LV INTERNAL DIMENSION SYSTOLIC: 4.3 CM
ECHO LV IVSD: 1.1 CM (ref 0.6–0.9)
ECHO LV MASS 2D: 264.7 G (ref 67–162)
ECHO LV MASS INDEX 2D: 136.4 G/M2 (ref 43–95)
ECHO LV POSTERIOR WALL DIASTOLIC: 1.2 CM (ref 0.6–0.9)
ECHO LV RELATIVE WALL THICKNESS RATIO: 0.43
ECHO LVOT AREA: 3.8 CM2
ECHO LVOT DIAM: 2.2 CM
ECHO MV MAX VELOCITY: 0.9 M/S
ECHO MV MEAN GRADIENT: 2 MMHG
ECHO MV MEAN VELOCITY: 0.6 M/S
ECHO MV PEAK GRADIENT: 4 MMHG
ECHO MV VTI: 22 CM
ECHO TV REGURGITANT MAX VELOCITY: 1.36 M/S
ECHO TV REGURGITANT PEAK GRADIENT: 7 MMHG
ENA JO1 AB SER-ACNC: <0.2 AI (ref 0–0.9)
ENA RNP AB SER-ACNC: <0.2 AI (ref 0–0.9)
ENA SCL70 AB SER-ACNC: <0.2 AI (ref 0–0.9)
ENA SM AB SER-ACNC: <0.2 AI (ref 0–0.9)
ENA SM+RNP AB SER-ACNC: <0.2 AI (ref 0–0.9)
ENA SS-A AB SER-ACNC: <0.2 AI (ref 0–0.9)
ENA SS-B AB SER-ACNC: <0.2 AI (ref 0–0.9)
EOSINOPHIL # BLD: 0.5 K/UL (ref 0–0.4)
EOSINOPHIL NFR BLD: 5 % (ref 0–7)
ERYTHROCYTE [DISTWIDTH] IN BLOOD BY AUTOMATED COUNT: 13.3 % (ref 11.5–14.5)
GLOBULIN SER CALC-MCNC: 4 G/DL (ref 2–4)
GLUCOSE CSF-MCNC: 58 MG/DL (ref 40–70)
GLUCOSE SERPL-MCNC: 129 MG/DL (ref 65–100)
HCT VFR BLD AUTO: 40.6 % (ref 35–47)
HGB BLD-MCNC: 12.9 G/DL (ref 11.5–16)
IMM GRANULOCYTES # BLD AUTO: 0 K/UL (ref 0–0.04)
IMM GRANULOCYTES NFR BLD AUTO: 0 % (ref 0–0.5)
LYME TOTAL ANTIBODY EIA: NEGATIVE
LYMPHOCYTES # BLD: 2.3 K/UL (ref 0.8–3.5)
LYMPHOCYTES NFR BLD: 24 % (ref 12–49)
MCH RBC QN AUTO: 29.3 PG (ref 26–34)
MCHC RBC AUTO-ENTMCNC: 31.8 G/DL (ref 30–36.5)
MCV RBC AUTO: 92.3 FL (ref 80–99)
MONOCYTES # BLD: 0.7 K/UL (ref 0–1)
MONOCYTES NFR BLD: 8 % (ref 5–13)
NEUTS SEG # BLD: 6 K/UL (ref 1.8–8)
NEUTS SEG NFR BLD: 63 % (ref 32–75)
NRBC # BLD: 0 K/UL (ref 0–0.01)
NRBC BLD-RTO: 0 PER 100 WBC
PLATELET # BLD AUTO: 301 K/UL (ref 150–400)
PMV BLD AUTO: 9.2 FL (ref 8.9–12.9)
POTASSIUM SERPL-SCNC: 3.7 MMOL/L (ref 3.5–5.1)
PROT CSF-MCNC: 97 MG/DL (ref 15–45)
PROT SERPL-MCNC: 7.5 G/DL (ref 6.4–8.2)
RBC # BLD AUTO: 4.4 M/UL (ref 3.8–5.2)
RBC # CSF: 0 /CU MM
RBC # CSF: 0 /CU MM
RIBOSOMAL P AB SER-ACNC: <0.2 AI (ref 0–0.9)
SEE BELOW:, 164879: NORMAL
SODIUM SERPL-SCNC: 140 MMOL/L (ref 136–145)
TUBE # CSF: 1
TUBE # CSF: 2
TUBE # CSF: 2
TUBE # CSF: 3
WBC # BLD AUTO: 9.5 K/UL (ref 3.6–11)
WBC # CSF: 1 /CU MM (ref 0–5)
WBC # CSF: 2 /CU MM (ref 0–5)

## 2022-12-12 PROCEDURE — 80053 COMPREHEN METABOLIC PANEL: CPT

## 2022-12-12 PROCEDURE — 74011000250 HC RX REV CODE- 250: Performed by: PHYSICIAN ASSISTANT

## 2022-12-12 PROCEDURE — 009U3ZX DRAINAGE OF SPINAL CANAL, PERCUTANEOUS APPROACH, DIAGNOSTIC: ICD-10-PCS | Performed by: STUDENT IN AN ORGANIZED HEALTH CARE EDUCATION/TRAINING PROGRAM

## 2022-12-12 PROCEDURE — 85025 COMPLETE CBC W/AUTO DIFF WBC: CPT

## 2022-12-12 PROCEDURE — 62270 DX LMBR SPI PNXR: CPT

## 2022-12-12 PROCEDURE — 74011250637 HC RX REV CODE- 250/637: Performed by: PHYSICIAN ASSISTANT

## 2022-12-12 PROCEDURE — 93308 TTE F-UP OR LMTD: CPT

## 2022-12-12 PROCEDURE — 74011250637 HC RX REV CODE- 250/637: Performed by: NURSE PRACTITIONER

## 2022-12-12 PROCEDURE — 92610 EVALUATE SWALLOWING FUNCTION: CPT

## 2022-12-12 PROCEDURE — 65270000046 HC RM TELEMETRY

## 2022-12-12 PROCEDURE — 99233 SBSQ HOSP IP/OBS HIGH 50: CPT | Performed by: PSYCHIATRY & NEUROLOGY

## 2022-12-12 PROCEDURE — 36415 COLL VENOUS BLD VENIPUNCTURE: CPT

## 2022-12-12 RX ORDER — LIDOCAINE HYDROCHLORIDE 10 MG/ML
20 INJECTION, SOLUTION EPIDURAL; INFILTRATION; INTRACAUDAL; PERINEURAL
Status: DISCONTINUED | OUTPATIENT
Start: 2022-12-12 | End: 2022-12-12 | Stop reason: SDUPTHER

## 2022-12-12 RX ORDER — LIDOCAINE HYDROCHLORIDE 10 MG/ML
10 INJECTION, SOLUTION EPIDURAL; INFILTRATION; INTRACAUDAL; PERINEURAL
Status: DISPENSED | OUTPATIENT
Start: 2022-12-12 | End: 2022-12-13

## 2022-12-12 RX ADMIN — ASPIRIN 325 MG ORAL TABLET 325 MG: 325 PILL ORAL at 09:09

## 2022-12-12 RX ADMIN — DULOXETINE HYDROCHLORIDE 60 MG: 30 CAPSULE, DELAYED RELEASE ORAL at 09:10

## 2022-12-12 RX ADMIN — ATORVASTATIN CALCIUM 40 MG: 40 TABLET, FILM COATED ORAL at 09:10

## 2022-12-12 RX ADMIN — TROSPIUM CHLORIDE 20 MG: 20 TABLET, FILM COATED ORAL at 09:30

## 2022-12-12 RX ADMIN — CARVEDILOL 25 MG: 12.5 TABLET, FILM COATED ORAL at 09:10

## 2022-12-12 RX ADMIN — GUAIFENESIN 600 MG: 600 TABLET, EXTENDED RELEASE ORAL at 19:41

## 2022-12-12 RX ADMIN — SODIUM CHLORIDE, PRESERVATIVE FREE 10 ML: 5 INJECTION INTRAVENOUS at 05:44

## 2022-12-12 RX ADMIN — GUAIFENESIN 600 MG: 600 TABLET, EXTENDED RELEASE ORAL at 09:10

## 2022-12-12 RX ADMIN — BUPROPION HYDROCHLORIDE 150 MG: 150 TABLET, EXTENDED RELEASE ORAL at 09:10

## 2022-12-12 RX ADMIN — DULOXETINE HYDROCHLORIDE 60 MG: 30 CAPSULE, DELAYED RELEASE ORAL at 17:43

## 2022-12-12 RX ADMIN — HYDROMORPHONE HYDROCHLORIDE 1 MG: 2 TABLET ORAL at 19:39

## 2022-12-12 RX ADMIN — CETIRIZINE HYDROCHLORIDE 10 MG: 10 TABLET, FILM COATED ORAL at 09:10

## 2022-12-12 RX ADMIN — BUTALBITAL, ACETAMINOPHEN, AND CAFFEINE 1 TABLET: 50; 325; 40 TABLET ORAL at 13:24

## 2022-12-12 RX ADMIN — FUROSEMIDE 40 MG: 40 TABLET ORAL at 09:10

## 2022-12-12 RX ADMIN — TROSPIUM CHLORIDE 20 MG: 20 TABLET, FILM COATED ORAL at 17:43

## 2022-12-12 NOTE — PROGRESS NOTES
Report received call bell within reach pt requesting pain med when available     2145  Pt given med without difficulty     2323  Pt given Fioricet for complaint of pain     0307  Labs drawn without difficulty     0540  Pt given CHG wipes and clean gown voiced no other needs call bell within reach     0730  Bedside and Verbal shift change report given to Alina (oncoming nurse) by Niecy Cisse (offgoing nurse). Report included the following information SBAR, Kardex, ED Summary, Intake/Output, MAR, and Recent Results.

## 2022-12-12 NOTE — PROGRESS NOTES
Neurology Note    Patient ID:  Geronimo Kim  [id number]  40 y.o.  1960      Date of Consultation:  December 12, 2022      Assessment and Plan:    The patient is a 45-year-old female with a subacute onset of diplopia after developing a recent viral illness. 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 Diplopia:    Differential includes ischemic 6th nerve, infectious, inflammatory autoimmune  Brain MRI with no obvious etiology. MRI negative for an acute stroke. Follow-up brain MRI with contrast reveals no significant inflammatory or infectious etiology. Lumbar puncture is scheduled for today. CTA with no aneursymal compression  serology looking for possible metabolic or autoimmune etiologies pending. Serum lyme was negative, tsh normal, ace normal, anti-ds dna normal, ssa/ssb negative  She will continue to wear the eye patch for the time being. Htn: maintain sbp < 140  Continue aspirin 81 mg. Hemoglobin a1c was at goal - 5.7  Ldl: 56, triglycerides  348    Neurological continue to follow along closely            Subjective: my vision is still double     History of Present Illness:   Geronimo Kim is a 63 y.o. female with a history of hypertension and dyslipidemia who presented to the emergency department due to double vision . The patient states that her family has all had a recent viral illness and she began to develop head congestion and a cough within the past 2 weeks. She reports that she was coughing so much that she was losing her voice. She continued to develop a increasing pressure in her head that was bifrontal in nature. She then began noticing that she was developing double vision. Initially she felt the objects were horizontal but has noticed more of a skew double vision over the past 48 hours.   She was out to lunch with a friend who noticed that her 1 eye was not moving and conjugate with the others and ultimately it was decided that she should seek medical attention. Since admission, there has been no worsening nor improvement in her symptoms. She initially went to [facility] where she did have a CT of her head and a CTA with no clear etiology. The patient does continue to struggle with her diplopia. Unchanged. She does need to keep her eye patch on. She did have severe neck and shoulder pain last evening requiring pain medication. She did have her lp this afternoon. Procedure went well.       Past Medical History:   Diagnosis Date    Asthma     Heart failure ([address]     Hypercholesteremia     Hypertension     Ill-defined condition     Psoriasis    The patient reports of having had a history of Lyme disease as well as other tick borne disease in the past that was quite severe is causing her to be wheelchair-bound for an extended period of time    Past Surgical History:   Procedure Laterality Date    HX OTHER SURGICAL Bilateral     JAW plates    HX OTHER SURGICAL Right     Ankle with screws and plates        Family History   Problem Relation Age of Onset    Heart Disease Father     Diabetes Father     Hypertension Father     Kidney Disease Father     High Cholesterol Father         Social History     Tobacco Use    Smoking status: Former     Types: Cigarettes     Quit date: 5/15/2007     Years since quitting: 15.5    Smokeless tobacco: Never   Substance Use Topics    Alcohol use: No        Allergies   Allergen Reactions    Gold Au 198 Rash          Current Facility-Administered Medications   Medication Dose Route Frequency    lidocaine (PF) (XYLOCAINE) 10 mg/mL (1 %) injection 10 mL  10 mL SubCUTAneous RAD ONCE    HYDROmorphone (DILAUDID) tablet 1 mg  1 mg Oral Q4H PRN    aspirin tablet 325 mg  325 mg Oral DAILY    atorvastatin (LIPITOR) tablet 40 mg  40 mg Oral DAILY    sodium chloride (NS) flush 5-40 mL  5-40 mL IntraVENous Q8H    sodium chloride (NS) flush 5-40 mL  5-40 mL IntraVENous PRN    acetaminophen (TYLENOL) tablet 650 mg  650 mg Oral Q6H PRN    Or    acetaminophen (TYLENOL) suppository 650 mg  650 mg Rectal Q6H PRN    polyethylene glycol (MIRALAX) packet 17 g  17 g Oral DAILY PRN    ondansetron (ZOFRAN ODT) tablet 4 mg  4 mg Oral Q8H PRN    Or    ondansetron (ZOFRAN) injection 4 mg  4 mg IntraVENous Q6H PRN    buPROPion ER (ZYBAN,BUPROBAN) tablet 150 mg  150 mg Oral DAILY    cetirizine (ZYRTEC) tablet 10 mg  10 mg Oral DAILY    DULoxetine (CYMBALTA) capsule 60 mg  60 mg Oral BID    furosemide (LASIX) tablet 40 mg  40 mg Oral DAILY    trospium (SANCTURA) tablet 20 mg  20 mg Oral BID    hydrOXYzine HCL (ATARAX) tablet 25 mg  25 mg Oral TID PRN    hydrALAZINE (APRESOLINE) 20 mg/mL injection 20 mg  20 mg IntraVENous Q6H PRN    melatonin tablet 3 mg  3 mg Oral QHS PRN    butalbital-acetaminophen-caffeine (FIORICET, ESGIC) -40 mg per tablet 1 Tablet  1 Tablet Oral Q4H PRN    guaiFENesin ER (MUCINEX) tablet 600 mg  600 mg Oral Q12H    carvediloL (COREG) tablet 25 mg  25 mg Oral DAILY       Review of Systems:    General, constitutional:head congestion  Eyes, vision: negative  Ears, nose, throat: negative  Cardiovascular, heart: negative  Respiratory: negative  Gastrointestinal: negative  Genitourinary: negative  Musculoskeletal: negative  Skin and integumentary: negative  Psychiatric: negative  Endocrine: negative  Neurological: negative, except for HPI  Hematologic/lymphatic: negative  Allergy/immunology: negative    Objective:     Visit Vitals  /62 (BP 1 Location: Left upper arm, BP Patient Position: Supine)   Pulse 76   Temp 97.3 °F (36.3 °C)   Resp 16   Ht 5' 6\" (1.676 m)   Wt 186 lb (84.4 kg)   SpO2 96%   BMI 30.02 kg/m²       Physical Exam:    General:  appears well nourished in no acute distress  Neck: no carotid bruits  Lungs: clear to auscultation  Heart:  no murmurs, regular rate  Lower extremity: peripheral pulses palpable and no edema  Skin: intact    Neurological exam:    Awake, alert, oriented to person, place and time  Recent and remote memory were normal  Attention and concentration were intact  Language was intact. There was no aphasia  Speech: no dysarthria  Fund of knowledge was preserved    Cranial nerves:   II-XII were tested    Perrrla  Visual fields were full  She does have a disconjugate primary gaze with intorsion of the left. She cannot fully abduct her left eye. It appears to be a incomplete 6th nerve palsy. persisting  There was no ptosis. There was no fatigable ptosis  Facial sensation:  normal and symmetric  Facial motor: normal and symmetric  Hearing intact  SCM strength intact  Tongue: midline without fasciculations    Motor: Tone normal  Pronator drift was absent    No evidence of fasciculations    Strength testing:   deltoid triceps biceps Wrist ext. Wrist flex. intrinsics Hip flex. Hip ext. Knee ext. Knee flex Dorsi flex Plantar flex   Right 5 5 5 5 5 5 5 5 5 5 5 5   Left 5 5 5 5 5 5 5 5 5 5 5 5       Sensory:  Upper extremity: intact to pp,  Lower extremity: intact to pp,    Reflexes:    Right Left  Biceps  2 2  Triceps     2 2  Brachiorad. 2 2  Patella  1 1  Achilles 1 1  Cerebellar testing:  no tremor apparent, finger/nose and rachell were intact    Gait:not assessed due to concerns over safety. Labs:     Lab Results   Component Value Date/Time    Hemoglobin A1c 5.7 (H) 12/11/2022 04:00 AM    Sodium 140 12/12/2022 03:07 AM    Potassium 3.7 12/12/2022 03:07 AM    Chloride 101 12/12/2022 03:07 AM    Glucose 129 (H) 12/12/2022 03:07 AM    BUN 20 12/12/2022 03:07 AM    Creatinine 1.21 (H) 12/12/2022 03:07 AM    Calcium 9.2 12/12/2022 03:07 AM    WBC 9.5 12/12/2022 03:07 AM    HCT 40.6 12/12/2022 03:07 AM    HGB 12.9 12/12/2022 03:07 AM    PLATELET 225 52/80/3320 03:07 AM       Imaging:    Results from [place] encounter on 12/10/22    MRI BRAIN W WO CONT    Narrative  EXAM:  MRI BRAIN W WO CONT    INDICATION: 74-year-old female with abnormal findings on non contrast brain MRI.   Patient now returns for postcontrast study. COMPARISON:  12/10/2022 brain MRI. CONTRAST: 15 ml ProHance. TECHNIQUE:  Multiplanar multisequence acquisition without and with contrast of the brain. FINDINGS:  Corresponding to previously demonstrated areas of FLAIR/T2 hyperintense signal  abnormality in the inferomedial left lateral ventricular wall, there is no  associated significant contrast enhancement or obvious mass lesion. The findings  are much less conspicuous compared to prior study. The ventricles sizes and  configuration are within normal limits. There is no acute infarct, hemorrhage,  extra-axial fluid collection, or mass effect. There is no cerebellar tonsillar  herniation. Expected arterial flow-voids are present. Basal CSF cisterns are  patent. Mild diffuse mucosal thickening of the paranasal sinuses. There is moderate to  large left and moderate right mastoid effusions, similar to prior study. The  orbital contents are within normal limits. No significant osseous or scalp  lesions are identified. Impression  Previously demonstrated T2/FLAIR signal abnormality about juxtapleural ventricle  left caudate head/thalamus is much less conspicuous on current study demonstrate  no associated contrast enhancement. These are nonspecific and may represent  sequelae of prior ischemia/infarct. Attention on follow-up is advised. Bilateral mastoid effusions. Results from [place] encounter on 12/09/22    CTA HEAD NECK W CONT    Narrative  EXAM:  CTA HEAD NECK W CONT    INDICATION:   cva; left eye cn 6 palsy    COMPARISON:  None. CONTRAST:  100 mL of Isovue-370. TECHNIQUE:  Unenhanced  images were obtained to localize the volume for  acquisition. Multislice helical axial CT angiography was performed from the  aortic arch to the top of the head during uneventful rapid bolus intravenous  contrast administration. Coronal and sagittal reformations and 3D/MIP  post  processing were performed.   CT dose reduction was achieved through use of a standardized protocol tailored  for this examination and automatic exposure control for dose modulation. This  study was analyzed by the [address] ai algorithm. FINDINGS:    CTA Head:  Proximal occlusion of the right MCA, likely chronic with extensive small vessel  collaterals formation. There is no evidence of large vessel occlusion or flow-limiting stenosis of the  intracranial internal carotid, anterior cerebral, and right middle cerebral  arteries. Calcification of the bilateral carotid siphons without significant  stenosis. The anterior communicating artery is patent. There is no evidence of large vessel occlusion or flow-limiting stenosis of the  intracranial vertebral arteries, basilar artery, or posterior cerebral arteries. There is no definite posterior communicating arteries identified. There is no evidence of aneurysm or vascular malformation. The dural venous  sinuses and deep cerebral venous system are patent. No evidence of abnormal  enhancement on delayed phase images. Brain parenchyma demonstrates no suggestion of acute infarct. CTA NECK:  NASCET method was utilized for calculating stenosis. The aortic arch is unremarkable. The common carotid arteries demonstrate no  significant stenosis. There is no evidence of significant stenosis in the  cervical right internal carotid artery. There is no evidence of significant  stenosis in the cervical left internal carotid artery. Carotid bulbs plaques  without significant luminal narrowing.  % of right carotid artery stenosis: 0  % of left carotid artery stenosis: 0    There is a codominant vertebrobasilar arterial system. The cervical vertebral  arteries are normal in course, size and contour without significant stenosis. Dental amalgam associated metallic artifact precludes optimal evaluation of the  adjacent structures, oral cavity and oropharynx.  Maxillofacial reconstruction  hardware along bilateral anterior maxillary wall. The orbits are bilaterally  unremarkable and without obvious mass lesion. Visualized soft tissues of the  skull base and neck are unremarkable. Visualized paranasal sinuses and mastoids  are patent. Visualized lung apices are clear. No acute fracture or aggressive osseous lesion. Degenerative changes of the  cervical spine. Impression  CTA  1. CTA neck demonstrates no large vessel occlusion, high-grade stenosis or  aneurysm. 2. 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 Findings may represent moyamoya  disease versus chronic atherosclerotic occlusion. Correlate with brain MRI to  exclude acute infarct. 3. No intracranial aneurysm or obvious intraorbital mass lesion. head CT from December 9, 2022. There is no acute abnormality. CTA of the head and neck revealed no large vessel flow-limiting stenosis. There was possible occlusion of the left MCA with small vessel collateralization. Brain MRI was recommended    brain MRI from December 10, 2022. There is no clear evidence of an acute stroke. There is subtle signal abnormality in the left thalamus which may resent a subacute to chronic stroke. Contrasted image on 12/11/2022 with no abnormal enhancement. I spent  40  minutes providing care to this  acutely ill inpatient with > 50% of the time counseling and assisting in the coordination of care of the patient on the patient's hospital floor/unit.          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

## 2022-12-12 NOTE — PROGRESS NOTES
End of Shift Note    Bedside shift change report given to Susan Dior RN (oncoming nurse) by Chastity Nguyễn RN (offgoing nurse). Report included the following information SBAR, Kardex, MAR, and Cardiac Rhythm NSR    Shift worked:  7a-7p     Shift summary and any significant changes:     Patient states that the diluadid is helping with the pain. Echo and lumbar puncture. Concerns for physician to address:  N/A     Zone phone for oncoming shift:   [phone]       Activity:  Activity Level: Up ad dianne  Number times ambulated in hallways past shift: 0  Number of times OOB to chair past shift: 3    Cardiac:   Cardiac Monitoring: Yes      Cardiac Rhythm: Sinus Rhythm    Access:  Current line(s): PIV     Genitourinary:   Urinary status: voiding    Respiratory:   O2 Device: None (Room air)  Chronic home O2 use?: NO  Incentive spirometer at bedside: N/A       GI:  Last Bowel Movement Date: 12/09/22  Current diet:  ADULT DIET Easy to Chew  Passing flatus: YES  Tolerating current diet: YES       Pain Management:   Patient states pain is manageable on current regimen: YES    Skin:  Danilo Score: 21  Interventions: float heels and increase time out of bed    Patient Safety:  Fall Score:  Total Score: 0  Interventions: bed/chair alarm and pt to call before getting OOB       Length of Stay:  Expected LOS: - - -  Actual LOS: [address], RN

## 2022-12-12 NOTE — PROGRESS NOTES
Hospitalist Progress Note    NAME: Rebecca Calabrese   :  1960   MRN:  [id number]       Assessment / Plan:  1400 pt requesting additional pain medications states fiorcet not helpful   Patient for lumbar puncture to rule out thyroid component of left palsy.  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 Small areas of signal abnormality in the medial left thalamus and caudate/corona radiata inferiorly may represent subacute/chronic infarct particularly given chronic   Appreciate Neuro input  recc lumbar puncture  -MRI brain with contrast showed nonspecific sequelae of prior ischemia/infarct. Pt continues with headache - Fioricet added states states helping with headache  Echocardiogram pending   CTA of the neck demonstrates no large vessel occlusion or high-grade stenosis  CTA of the head demonstrates probable chronic occlusion of the left MCA with formation of small vessel collateralization. Possibly representing moyamoya disease versus chronic atherosclerotic occlusion  CT of the head without acute intracranial process  Lipid panel  Trigly - 348 LDL 56 pt on Atorvastin   Hgb A1c 5.7      Essential hypertension  - resume Coreg , pt states not taking Lisinopril or Spironolactone   - Hydralazine for systolic blood pressure greater 170  - Blood pressures improved systolic blood pressure 201G overnight  -Continue with Coreg for now     Depression  Continue Wellbutrin      History of Lyme disease/ bartonella /babesia  Stable and treated    -Bladder spasm  Continue Myrbetriq or equivalent       30.0 - 39.9 Obese / Body mass index is 30.02 kg/m².     Estimated discharge date:   Barriers: Lumbar puncture     Code status: Full  Prophylaxis:  ASA /SCDs  Recommended Disposition: Home w/Family     Subjective:     Chief Complaint / Reason for Physician Visit  \"Pt reports improvement in her headache. Denies any new complaints. Reports feeling improved overall. Denies fever, chills, chest pain, shortness of breath abdominal pain, vomiting and diarrhea. No overnight events reported by nursing staff. Review of Systems:  Symptom Y/N Comments  Symptom Y/N Comments   Fever/Chills n   Chest Pain n    Poor Appetite    Edema     Cough    Abdominal Pain     Sputum    Joint Pain     SOB/HEMPHILL n   Pruritis/Rash     Nausea/vomit    Tolerating PT/OT     Diarrhea    Tolerating Diet y    Constipation    Other headache y      Could NOT obtain due to:      Objective:     VITALS:   Last 24hrs VS reviewed since prior progress note. Most recent are:  Patient Vitals for the past 24 hrs:   Temp Pulse Resp BP SpO2   12/12/22 1126 97.3 °F (36.3 °C) 68 16 113/62 96 %   12/12/22 1006 -- -- -- 137/74 --   12/12/22 0811 97.5 °F (36.4 °C) 67 16 137/74 94 %   12/12/22 0800 -- 90 -- -- --   12/12/22 0421 98.1 °F (36.7 °C) 80 17 111/62 99 %   12/11/22 2316 97.7 °F (36.5 °C) 79 16 113/66 98 %   12/11/22 2055 -- 71 17 (!) 147/68 97 %   12/11/22 1520 98.7 °F (37.1 °C) 69 18 (!) 116/50 97 %         Intake/Output Summary (Last 24 hours) at 12/12/2022 1200  Last data filed at 12/12/2022 0004  Gross per 24 hour   Intake 240 ml   Output --   Net 240 ml          I had a face to face encounter and independently examined this patient on 12/12/2022, as outlined below:  PHYSICAL EXAM:  General: WD, WN. Alert, cooperative, no acute distress    EENT:  diplopia Anicteric sclerae. MMM  Resp:  CTA bilaterally, no wheezing or rales. No accessory muscle use  CV:  Regular  rhythm,  No edema  GI:  Soft, Non distended, Non tender. +Bowel sounds  Neurologic:  Alert and oriented X 3, normal speech,   Psych:   Good insight. Not anxious nor agitated  Skin:  No rashes.   No jaundice    Reviewed most current lab test results and cultures  YES  Reviewed most current radiology test results   YES  Review and summation of old records today    NO  Reviewed patient's current orders and MAR    YES  PMH/SH reviewed - no change compared to H&P  ________________________________________________________________________  Care Plan discussed with:    Comments   Patient x    Family      RN x    Care Manager     Consultant                        Multidiciplinary team rounds were held today with , nursing, pharmacist and clinical coordinator. Patient's plan of care was discussed; medications were reviewed and discharge planning was addressed. ________________________________________________________________________  Total NON critical care TIME:  30   Minutes    Total CRITICAL CARE TIME Spent:   Minutes non procedure based      Comments   >50% of visit spent in counseling and coordination of care     ________________________________________________________________________  Sugar Bird,      Procedures: see electronic medical records for all procedures/Xrays and details which were not copied into this note but were reviewed prior to creation of Plan. LABS:  I reviewed today's most current labs and imaging studies. Pertinent labs include:  Recent Labs     12/12/22  0307 12/11/22  0400 12/09/22  1555   WBC 9.5 5.9 6.9   HGB 12.9 12.2 11.4*   HCT 40.6 37.3 34.2*    282 299       Recent Labs     12/12/22  0307 12/11/22  0400 12/09/22  1555    142 141   K 3.7 3.7 3.2*    105 106   CO2 30 28 27   * 117* 119*   BUN 20 16 15   CREA 1.21* 0.96 1.24*   CA 9.2 9.2 8.8   ALB 3.5 3.3* 3.5   TBILI 0.5 0.5 0.4   ALT 25 24 23   INR  --   --  1.0         Signed:  Sugar Bird,

## 2022-12-12 NOTE — PROGRESS NOTES
Speech Pathology Contact Note      Chart reviewed and spoke with RN in prep to see patient. Patient currently off the floor for Echo. Will defer at this time and follow up as able for SLP evaluation.     Mayuri Esteves, SLP

## 2022-12-12 NOTE — PROGRESS NOTES
End of Shift Note    Bedside shift change report given to RN (oncoming nurse) by Kb Ceron RN (offgoing nurse). Report included the following information SBAR and Kardex    Shift worked:  7-7p     Shift summary and any significant changes:     LP today, diplopia present,       Concerns for physician to address:  na     Zone phone for oncoming shift:   [phone]       Activity:  Activity Level: Up ad dianne  Number times ambulated in hallways past shift: 0  Number of times OOB to chair past shift: 0    Cardiac:   Cardiac Monitoring: Yes      Cardiac Rhythm: Sinus Rhythm    Access:  Current line(s): PIV     Genitourinary:   Urinary status: voiding    Respiratory:   O2 Device: None (Room air)  Chronic home O2 use?: NO  Incentive spirometer at bedside: NO       GI:  Last Bowel Movement Date: 12/09/22  Current diet:  ADULT DIET Regular  Passing flatus: YES  Tolerating current diet: YES       Pain Management:   Patient states pain is manageable on current regimen: YES    Skin:  Danilo Score: 21  Interventions: increase time out of bed and PT/OT consult    Patient Safety:  Fall Score:  Total Score: 1  Interventions: stay with me (per policy)       Length of Stay:  Expected LOS: - - -  Actual LOS: 2      Kb Ceron RN

## 2022-12-12 NOTE — PROGRESS NOTES
SPEECH PATHOLOGY BEDSIDE SWALLOW EVALUATION/DISCHARGE  Patient: Lorrie Miles (41 y.o. 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 Patient presented to ED with diplopia. NIH: 0, passed swallow screen and MRI findings slightly unclear indicating the following: \"Previously demonstrated T2/FLAIR signal abnormality about juxtapleural ventricle left caudate head/thalamus is much less conspicuous on current study demonstrateno associated contrast enhancement. These are nonspecific and may represent  sequelae of prior ischemia/infarct. Attention on follow-up is advised. Bilateral mastoid effusions. \"  Based on these findings would not suspect silent aspiration risk. Patient endorses no concern with swallowing. Oriented x4. Observed with puree, solid, and thin liquids with adequate bolus formation, oral clearance and no overt s/s aspiration. Recommend regular diet + thin liquids with meds as tolerated. Skilled acute therapy provided by a speech-language pathologist is not indicated at this time. PLAN :  Recommendations:  --regular diet + thin liquids  --meds as tolerated  --no SLP treatment indicated  Discharge Recommendations: To Be Determined     SUBJECTIVE:   Patient stated Naomi Evans are casting a wide net to try and figure out what's going on.     OBJECTIVE:     Past Medical History:   Diagnosis Date    Asthma     Heart failure ([facility] [address]     Hypercholesteremia     Hypertension     Ill-defined condition     Psoriasis      Past Surgical History:   Procedure Laterality Date    HX OTHER SURGICAL Bilateral     JAW plates    HX OTHER SURGICAL Right     Ankle with screws and plates     Prior Level of Function/Home Situation: independent  Home Situation  Home Environment: Private residence  One/Two Story Residence: Two story  Living Alone: No  Support Systems: Spouse/Significant Other  Patient Expects to be Discharged to[de-identified] Home with family assistance  Current DME Used/Available at Home: None  Tub or Shower Type: Shower  Diet prior to admission: regular  Current Diet:  regular   Cognitive and Communication Status:  Neurologic State: Alert  Orientation Level: Oriented X4  Cognition: Follows commands, Appropriate decision making  Perception: Appears intact  Perseveration: No perseveration noted  Safety/Judgement: Awareness of environment, Good awareness of safety precautions, Decreased insight into deficits  Oral Assessment:  Oral Assessment  Labial: No impairment  Dentition: Natural  Oral Hygiene: pink and moist  Lingual: No impairment  Velum: No impairment  Mandible: No impairment  P.O. Trials:  Patient Position: upright in bed  Vocal quality prior to P.O.: No impairment  Consistency Presented: Solid; Thin liquid;Puree  How Presented: Spoon;Cup/sip;SLP-fed/presented     Bolus Acceptance: No impairment  Bolus Formation/Control: No impairment     Propulsion: No impairment  Oral Residue: None  Initiation of Swallow: No impairment  Laryngeal Elevation: Functional  Aspiration Signs/Symptoms: None  Pharyngeal Phase Characteristics: No impairment, issues, or problems              Oral Phase Severity: No impairment  Pharyngeal Phase Severity : No impairment  NOMS:   The NOMS functional outcome measure was used to quantify this patient's level of swallowing impairment. Based on the NOMS, the patient was determined to be at level 7 for swallow function     NOMS Swallowing Levels:  Level 1 (CN): NPO  Level 2 (CM): NPO but takes consistency in therapy  Level 3 (CL): Takes less than 50% of nutrition p.o. and continues with nonoral feedings; and/or safe with mod cues; and/or max diet restriction  Level 4 (CK):  Safe swallow but needs mod cues; and/or mod diet restriction; and/or still requires some nonoral feeding/supplements  Level 5 (CJ): Safe swallow with min diet restriction; and/or needs min cues  Level 6 (CI): Independent with p.o.; rare cues; usually self cues; may need to avoid some foods or needs extra time  Level 7 ([address] [place] [address]): Independent for all p.o.  CULLEN. (2003). National Outcomes Measurement System (NOMS): Adult Speech-Language Pathology User's Guide. Pain:  Pain Scale 1: Numeric (0 - 10)  Pain Intensity 1: 8  Pain Location 1: Head;Shoulder;Nose  After treatment:   Patient left in no apparent distress in bed, Call bell within reach, and Nursing notified    COMMUNICATION/EDUCATION:   Patient was educated regarding her deficit(s) of functional swallow as this relates to her diagnosis of stroke workup. She demonstrated Excellent understanding as evidenced by verbalized undersatnding. The patient's plan of care including recommendations, planned interventions, and recommended diet changes were discussed with: Registered nurse.      Thank you for this referral.  CHESTER Banks  Time Calculation: 9 mins

## 2022-12-13 LAB
ALBUMIN SERPL-MCNC: 3.5 G/DL (ref 3.5–5)
ALBUMIN/GLOB SERPL: 1 {RATIO} (ref 1.1–2.2)
ALP SERPL-CCNC: 128 U/L (ref 45–117)
ALT SERPL-CCNC: 26 U/L (ref 12–78)
ANION GAP SERPL CALC-SCNC: 5 MMOL/L (ref 5–15)
AST SERPL-CCNC: 14 U/L (ref 15–37)
BACTERIA SPEC CULT: NORMAL
BASOPHILS # BLD: 0 K/UL (ref 0–0.1)
BASOPHILS NFR BLD: 1 % (ref 0–1)
BILIRUB SERPL-MCNC: 0.5 MG/DL (ref 0.2–1)
BUN SERPL-MCNC: 19 MG/DL (ref 6–20)
BUN/CREAT SERPL: 16 (ref 12–20)
CALCIUM SERPL-MCNC: 9 MG/DL (ref 8.5–10.1)
CHLORIDE SERPL-SCNC: 104 MMOL/L (ref 97–108)
CO2 SERPL-SCNC: 30 MMOL/L (ref 21–32)
CREAT SERPL-MCNC: 1.21 MG/DL (ref 0.55–1.02)
DIFFERENTIAL METHOD BLD: ABNORMAL
EOSINOPHIL # BLD: 0.5 K/UL (ref 0–0.4)
EOSINOPHIL NFR BLD: 7 % (ref 0–7)
ERYTHROCYTE [DISTWIDTH] IN BLOOD BY AUTOMATED COUNT: 13.3 % (ref 11.5–14.5)
GLOBULIN SER CALC-MCNC: 3.5 G/DL (ref 2–4)
GLUCOSE BLD STRIP.AUTO-MCNC: 158 MG/DL (ref 65–117)
GLUCOSE BLD STRIP.AUTO-MCNC: 178 MG/DL (ref 65–117)
GLUCOSE BLD STRIP.AUTO-MCNC: 179 MG/DL (ref 65–117)
GLUCOSE SERPL-MCNC: 128 MG/DL (ref 65–100)
GRAM STN SPEC: NORMAL
GRAM STN SPEC: NORMAL
HCT VFR BLD AUTO: 38 % (ref 35–47)
HERPES SIMPLEX VIRUS, CSF, UHSPT: NORMAL
HGB BLD-MCNC: 12.5 G/DL (ref 11.5–16)
IMM GRANULOCYTES # BLD AUTO: 0 K/UL (ref 0–0.04)
IMM GRANULOCYTES NFR BLD AUTO: 0 % (ref 0–0.5)
LYMPHOCYTES # BLD: 2.6 K/UL (ref 0.8–3.5)
LYMPHOCYTES NFR BLD: 35 % (ref 12–49)
MBP CSF-MCNC: 3.9 NG/ML (ref 0–3.7)
MCH RBC QN AUTO: 30 PG (ref 26–34)
MCHC RBC AUTO-ENTMCNC: 32.9 G/DL (ref 30–36.5)
MCV RBC AUTO: 91.1 FL (ref 80–99)
MONOCYTES # BLD: 0.7 K/UL (ref 0–1)
MONOCYTES NFR BLD: 9 % (ref 5–13)
NEUTS SEG # BLD: 3.5 K/UL (ref 1.8–8)
NEUTS SEG NFR BLD: 48 % (ref 32–75)
NRBC # BLD: 0 K/UL (ref 0–0.01)
NRBC BLD-RTO: 0 PER 100 WBC
PLATELET # BLD AUTO: 290 K/UL (ref 150–400)
PMV BLD AUTO: 9.4 FL (ref 8.9–12.9)
POTASSIUM SERPL-SCNC: 3.6 MMOL/L (ref 3.5–5.1)
PROT SERPL-MCNC: 7 G/DL (ref 6.4–8.2)
RBC # BLD AUTO: 4.17 M/UL (ref 3.8–5.2)
SERVICE CMNT-IMP: ABNORMAL
SERVICE CMNT-IMP: NORMAL
SODIUM SERPL-SCNC: 139 MMOL/L (ref 136–145)
WBC # BLD AUTO: 7.4 K/UL (ref 3.6–11)

## 2022-12-13 PROCEDURE — 86051 AQUAPORIN-4 ANTB ELISA: CPT

## 2022-12-13 PROCEDURE — 74011250636 HC RX REV CODE- 250/636: Performed by: PSYCHIATRY & NEUROLOGY

## 2022-12-13 PROCEDURE — 65270000046 HC RM TELEMETRY

## 2022-12-13 PROCEDURE — 82962 GLUCOSE BLOOD TEST: CPT

## 2022-12-13 PROCEDURE — 36415 COLL VENOUS BLD VENIPUNCTURE: CPT

## 2022-12-13 PROCEDURE — 86381 MITOCHONDRIAL ANTIBODY EACH: CPT

## 2022-12-13 PROCEDURE — 74011000258 HC RX REV CODE- 258: Performed by: PSYCHIATRY & NEUROLOGY

## 2022-12-13 PROCEDURE — 74011000250 HC RX REV CODE- 250: Performed by: PHYSICIAN ASSISTANT

## 2022-12-13 PROCEDURE — 80053 COMPREHEN METABOLIC PANEL: CPT

## 2022-12-13 PROCEDURE — 74011250637 HC RX REV CODE- 250/637: Performed by: NURSE PRACTITIONER

## 2022-12-13 PROCEDURE — 74011636637 HC RX REV CODE- 636/637: Performed by: STUDENT IN AN ORGANIZED HEALTH CARE EDUCATION/TRAINING PROGRAM

## 2022-12-13 PROCEDURE — 74011250637 HC RX REV CODE- 250/637: Performed by: PHYSICIAN ASSISTANT

## 2022-12-13 PROCEDURE — 85025 COMPLETE CBC W/AUTO DIFF WBC: CPT

## 2022-12-13 PROCEDURE — 99233 SBSQ HOSP IP/OBS HIGH 50: CPT | Performed by: PSYCHIATRY & NEUROLOGY

## 2022-12-13 RX ORDER — INSULIN LISPRO 100 [IU]/ML
INJECTION, SOLUTION INTRAVENOUS; SUBCUTANEOUS
Status: DISCONTINUED | OUTPATIENT
Start: 2022-12-13 | End: 2022-12-15 | Stop reason: HOSPADM

## 2022-12-13 RX ADMIN — SODIUM CHLORIDE, PRESERVATIVE FREE 10 ML: 5 INJECTION INTRAVENOUS at 06:10

## 2022-12-13 RX ADMIN — Medication 1 UNITS: at 22:13

## 2022-12-13 RX ADMIN — DULOXETINE HYDROCHLORIDE 60 MG: 30 CAPSULE, DELAYED RELEASE ORAL at 09:28

## 2022-12-13 RX ADMIN — SODIUM CHLORIDE 1000 MG: 900 INJECTION INTRAVENOUS at 12:55

## 2022-12-13 RX ADMIN — DULOXETINE HYDROCHLORIDE 60 MG: 30 CAPSULE, DELAYED RELEASE ORAL at 17:30

## 2022-12-13 RX ADMIN — FUROSEMIDE 40 MG: 40 TABLET ORAL at 09:29

## 2022-12-13 RX ADMIN — ASPIRIN 325 MG ORAL TABLET 325 MG: 325 PILL ORAL at 09:28

## 2022-12-13 RX ADMIN — TROSPIUM CHLORIDE 20 MG: 20 TABLET, FILM COATED ORAL at 09:33

## 2022-12-13 RX ADMIN — GUAIFENESIN 600 MG: 600 TABLET, EXTENDED RELEASE ORAL at 21:04

## 2022-12-13 RX ADMIN — HYDROMORPHONE HYDROCHLORIDE 1 MG: 2 TABLET ORAL at 01:00

## 2022-12-13 RX ADMIN — HYDROMORPHONE HYDROCHLORIDE 1 MG: 2 TABLET ORAL at 13:13

## 2022-12-13 RX ADMIN — HYDROMORPHONE HYDROCHLORIDE 1 MG: 2 TABLET ORAL at 21:17

## 2022-12-13 RX ADMIN — TROSPIUM CHLORIDE 20 MG: 20 TABLET, FILM COATED ORAL at 18:13

## 2022-12-13 RX ADMIN — Medication 1 UNITS: at 17:39

## 2022-12-13 RX ADMIN — HYDROMORPHONE HYDROCHLORIDE 1 MG: 2 TABLET ORAL at 17:31

## 2022-12-13 RX ADMIN — CARVEDILOL 25 MG: 12.5 TABLET, FILM COATED ORAL at 09:28

## 2022-12-13 RX ADMIN — ATORVASTATIN CALCIUM 40 MG: 40 TABLET, FILM COATED ORAL at 09:28

## 2022-12-13 RX ADMIN — SODIUM CHLORIDE, PRESERVATIVE FREE 10 ML: 5 INJECTION INTRAVENOUS at 00:49

## 2022-12-13 RX ADMIN — CETIRIZINE HYDROCHLORIDE 10 MG: 10 TABLET, FILM COATED ORAL at 09:28

## 2022-12-13 RX ADMIN — SODIUM CHLORIDE, PRESERVATIVE FREE 10 ML: 5 INJECTION INTRAVENOUS at 13:14

## 2022-12-13 RX ADMIN — MELATONIN 3 MG: at 01:01

## 2022-12-13 RX ADMIN — BUPROPION HYDROCHLORIDE 150 MG: 150 TABLET, EXTENDED RELEASE ORAL at 09:28

## 2022-12-13 RX ADMIN — SODIUM CHLORIDE, PRESERVATIVE FREE 10 ML: 5 INJECTION INTRAVENOUS at 21:04

## 2022-12-13 RX ADMIN — HYDROMORPHONE HYDROCHLORIDE 1 MG: 2 TABLET ORAL at 09:28

## 2022-12-13 RX ADMIN — GUAIFENESIN 600 MG: 600 TABLET, EXTENDED RELEASE ORAL at 09:28

## 2022-12-13 NOTE — PROGRESS NOTES
End of Shift Note    Bedside shift change report given to Ny Stevens 1154 (oncoming nurse) by Robin Pérez RN (offgoing nurse). Report included the following information SBAR and Kardex    Shift worked:  7p-7a     Shift summary and any significant changes:     Pt rested well over night. Medicated for pain x2. Concerns for physician to address:  na     Zone phone for oncoming shift:   [phone]       Activity:  Activity Level: Up ad dianne  Number times ambulated in hallways past shift: 0  Number of times OOB to chair past shift: 0    Cardiac:   Cardiac Monitoring: Yes      Cardiac Rhythm: Sinus Rhythm    Access:  Current line(s): PIV     Genitourinary:   Urinary status: voiding    Respiratory:   O2 Device: None (Room air)  Chronic home O2 use?: NO  Incentive spirometer at bedside: NO       GI:  Last Bowel Movement Date: 12/09/22  Current diet:  ADULT DIET Regular  Passing flatus: YES  Tolerating current diet: YES       Pain Management:   Patient states pain is manageable on current regimen: YES    Skin:  Danilo Score: 21  Interventions: increase time out of bed and PT/OT consult    Patient Safety:  Fall Score:  Total Score: 2  Interventions: stay with me (per policy)       Length of Stay:  Expected LOS: 2d 0h  Actual LOS: [address], RN

## 2022-12-13 NOTE — PROGRESS NOTES
Neurology Note    Patient ID:  Edith Hammer  [id number]  28 y.o.  1960      Date of Consultation:  December 13, 2022      Assessment and Plan:    The patient is a 51-year-old female with a subacute onset of diplopia after developing a recent viral illness. 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 No improvement in her diplopia    Diplopia:    Differential includes ischemic 6th nerve, infectious, inflammatory, autoimmune  Brain MRI with no obvious etiology. MRI negative for an acute stroke. Follow-up brain MRI with contrast reveals no significant inflammatory or infectious etiology. Lumbar puncture performed. Cell count normal.  Protein elevated. Hsv pending, csf lyme pending,  ms panel pending  Serum nmo and anti mog ordered  CTA with no aneursymal compression    Serum lyme was negative, mg panel prelim negative, tsh normal, ace normal, anti-ds dna normal, ssa/ssb negative, veto panel negative    She will continue to wear the eye patch for the time being. Given the results of high csf protein and no cells, will give patient 3 days of high dose steroids for a possible inflammatory/autoimmune etiology. I discussed the rationale with the patient today. Please monitor glucose levels during high dose steroids. Htn: maintain sbp < 140  Continue aspirin 81 mg. Hemoglobin a1c was at goal - 5.7  Ldl: 56, triglycerides  348    Sinus inflammation noted on imaging:     on decongestant  Depression  Bladder spasm      Neurological continue to follow along closely            Subjective: my vision is still double     History of Present Illness:   Edith Hammer is a 63 y.o. female with a history of hypertension and dyslipidemia who presented to the emergency department due to double vision . The patient states that her family has all had a recent viral illness and she began to develop head congestion and a cough within the past 2 weeks.   She reports that she was coughing so much that she was losing her voice. She continued to develop a increasing pressure in her head that was bifrontal in nature. She then began noticing that she was developing double vision. Initially she felt the objects were horizontal but has noticed more of a skew double vision over the past 48 hours. She was out to lunch with a friend who noticed that her 1 eye was not moving and conjugate with the others and ultimately it was decided that she should seek medical attention. Since admission, there has been no worsening nor improvement in her symptoms. She initially went to [facility] where she did have a CT of her head and a CTA with no clear etiology. The patient does continue to struggle with her diplopia. Unchanged. She does need to keep her eye patch on. She did have severe neck and shoulder pain last evening requiring pain medication. She did have her lp this afternoon. Procedure went well.       Past Medical History:   Diagnosis Date    Asthma     Heart failure ([facility] [address]     Hypercholesteremia     Hypertension     Ill-defined condition     Psoriasis    The patient reports of having had a history of Lyme disease as well as other tick borne disease in the past that was quite severe is causing her to be wheelchair-bound for an extended period of time    Past Surgical History:   Procedure Laterality Date    HX OTHER SURGICAL Bilateral     JAW plates    HX OTHER SURGICAL Right     Ankle with screws and plates        Family History   Problem Relation Age of Onset    Heart Disease Father     Diabetes Father     Hypertension Father     Kidney Disease Father     High Cholesterol Father         Social History     Tobacco Use    Smoking status: Former     Types: Cigarettes     Quit date: 5/15/2007     Years since quitting: 15.5    Smokeless tobacco: Never   Substance Use Topics    Alcohol use: No        Allergies   Allergen Reactions    Gold Au 198 Rash          Current Facility-Administered Medications Medication Dose Route Frequency    HYDROmorphone (DILAUDID) tablet 1 mg  1 mg Oral Q4H PRN    aspirin tablet 325 mg  325 mg Oral DAILY    atorvastatin (LIPITOR) tablet 40 mg  40 mg Oral DAILY    sodium chloride (NS) flush 5-40 mL  5-40 mL IntraVENous Q8H    sodium chloride (NS) flush 5-40 mL  5-40 mL IntraVENous PRN    acetaminophen (TYLENOL) tablet 650 mg  650 mg Oral Q6H PRN    Or    acetaminophen (TYLENOL) suppository 650 mg  650 mg Rectal Q6H PRN    polyethylene glycol (MIRALAX) packet 17 g  17 g Oral DAILY PRN    ondansetron (ZOFRAN ODT) tablet 4 mg  4 mg Oral Q8H PRN    Or    ondansetron (ZOFRAN) injection 4 mg  4 mg IntraVENous Q6H PRN    buPROPion ER (ZYBAN,BUPROBAN) tablet 150 mg  150 mg Oral DAILY    cetirizine (ZYRTEC) tablet 10 mg  10 mg Oral DAILY    DULoxetine (CYMBALTA) capsule 60 mg  60 mg Oral BID    furosemide (LASIX) tablet 40 mg  40 mg Oral DAILY    trospium (SANCTURA) tablet 20 mg  20 mg Oral BID    hydrOXYzine HCL (ATARAX) tablet 25 mg  25 mg Oral TID PRN    hydrALAZINE (APRESOLINE) 20 mg/mL injection 20 mg  20 mg IntraVENous Q6H PRN    melatonin tablet 3 mg  3 mg Oral QHS PRN    butalbital-acetaminophen-caffeine (FIORICET, ESGIC) -40 mg per tablet 1 Tablet  1 Tablet Oral Q4H PRN    guaiFENesin ER (MUCINEX) tablet 600 mg  600 mg Oral Q12H    carvediloL (COREG) tablet 25 mg  25 mg Oral DAILY       Review of Systems:    General, constitutional:head congestion  Eyes, vision: negative  Ears, nose, throat: negative  Cardiovascular, heart: negative  Respiratory: negative  Gastrointestinal: negative  Genitourinary: negative  Musculoskeletal: negative  Skin and integumentary: negative  Psychiatric: negative  Endocrine: negative  Neurological: negative, except for HPI  Hematologic/lymphatic: negative  Allergy/immunology: negative    Objective:     Visit Vitals  BP (!) 171/70   Pulse 77   Temp 97.5 °F (36.4 °C)   Resp 16   Ht 5' 6\" (1.676 m)   Wt 186 lb (84.4 kg)   SpO2 97%   BMI 30.02 kg/m² Physical Exam:    General:  appears well nourished in no acute distress  Neck: no carotid bruits  Lungs: clear to auscultation  Heart:  no murmurs, regular rate  Lower extremity: peripheral pulses palpable and no edema  Skin: intact    Neurological exam:    Awake, alert, oriented to person, place and time  Recent and remote memory were normal  Attention and concentration were intact  Language was intact. There was no aphasia  Speech: no dysarthria  Fund of knowledge was preserved    Cranial nerves:   II-XII were tested    Perrrla  Visual fields were full  She does have a disconjugate primary gaze with intorsion of the left. She cannot fully abduct her left eye. It appears to be a incomplete 6th nerve palsy. persisting  There was no ptosis. There was no fatigable ptosis  Facial sensation:  normal and symmetric  Facial motor: normal and symmetric  Hearing intact  SCM strength intact  Tongue: midline without fasciculations    Motor: Tone normal  Pronator drift was absent    No evidence of fasciculations    Strength testing:   deltoid triceps biceps Wrist ext. Wrist flex. intrinsics Hip flex. Hip ext. Knee ext. Knee flex Dorsi flex Plantar flex   Right 5 5 5 5 5 5 5 5 5 5 5 5   Left 5 5 5 5 5 5 5 5 5 5 5 5       Sensory:  Upper extremity: intact to pp,  Lower extremity: intact to pp,    Reflexes:    Right Left  Biceps  2 2  Triceps     2 2  Brachiorad. 2 2  Patella  1 1  Achilles 1 1  Cerebellar testing:  no tremor apparent, finger/nose and rachell were intact    Gait:not assessed due to concerns over safety.     Labs:     Lab Results   Component Value Date/Time    Hemoglobin A1c 5.7 (H) 12/11/2022 04:00 AM    Sodium 139 12/13/2022 12:59 AM    Potassium 3.6 12/13/2022 12:59 AM    Chloride 104 12/13/2022 12:59 AM    Glucose 128 (H) 12/13/2022 12:59 AM    BUN 19 12/13/2022 12:59 AM    Creatinine 1.21 (H) 12/13/2022 12:59 AM    Calcium 9.0 12/13/2022 12:59 AM    WBC 7.4 12/13/2022 12:59 AM    HCT 38.0 12/13/2022 12:59 AM    HGB 12.5 12/13/2022 12:59 AM    PLATELET 805 05/80/8993 12:59 AM       Imaging:    Results from Hospital Encounter encounter on 12/10/22    MRI BRAIN W WO CONT    Narrative  EXAM:  MRI BRAIN W WO CONT    INDICATION: 61-year-old female with abnormal findings on non contrast brain MRI. Patient now returns for postcontrast study. COMPARISON:  12/10/2022 brain MRI. CONTRAST: 15 ml ProHance. TECHNIQUE:  Multiplanar multisequence acquisition without and with contrast of the brain. FINDINGS:  Corresponding to previously demonstrated areas of FLAIR/T2 hyperintense signal  abnormality in the inferomedial left lateral ventricular wall, there is no  associated significant contrast enhancement or obvious mass lesion. The findings  are much less conspicuous compared to prior study. The ventricles sizes and  configuration are within normal limits. There is no acute infarct, hemorrhage,  extra-axial fluid collection, or mass effect. There is no cerebellar tonsillar  herniation. Expected arterial flow-voids are present. Basal CSF cisterns are  patent. Mild diffuse mucosal thickening of the paranasal sinuses. There is moderate to  large left and moderate right mastoid effusions, similar to prior study. The  orbital contents are within normal limits. No significant osseous or scalp  lesions are identified. Impression  Previously demonstrated T2/FLAIR signal abnormality about juxtapleural ventricle  left caudate head/thalamus is much less conspicuous on current study demonstrate  no associated contrast enhancement. These are nonspecific and may represent  sequelae of prior ischemia/infarct. Attention on follow-up is advised. Bilateral mastoid effusions. Results from [place] encounter on 12/09/22    CTA HEAD NECK W CONT    Narrative  EXAM:  CTA HEAD NECK W CONT    INDICATION:   cva; left eye cn 6 palsy    COMPARISON:  None. CONTRAST:  100 mL of Isovue-370.     TECHNIQUE:  Unenhanced  images were obtained to localize the volume for  acquisition. Multislice helical axial CT angiography was performed from the  aortic arch to the top of the head during uneventful rapid bolus intravenous  contrast administration. Coronal and sagittal reformations and 3D/MIP  post  processing were performed. CT dose reduction was achieved through use of a standardized protocol tailored  for this examination and automatic exposure control for dose modulation. This  study was analyzed by the [address] ai algorithm. FINDINGS:    CTA Head:  Proximal occlusion of the right MCA, likely chronic with extensive small vessel  collaterals formation. There is no evidence of large vessel occlusion or flow-limiting stenosis of the  intracranial internal carotid, anterior cerebral, and right middle cerebral  arteries. Calcification of the bilateral carotid siphons without significant  stenosis. The anterior communicating artery is patent. There is no evidence of large vessel occlusion or flow-limiting stenosis of the  intracranial vertebral arteries, basilar artery, or posterior cerebral arteries. There is no definite posterior communicating arteries identified. There is no evidence of aneurysm or vascular malformation. The dural venous  sinuses and deep cerebral venous system are patent. No evidence of abnormal  enhancement on delayed phase images. Brain parenchyma demonstrates no suggestion of acute infarct. CTA NECK:  NASCET method was utilized for calculating stenosis. The aortic arch is unremarkable. The common carotid arteries demonstrate no  significant stenosis. There is no evidence of significant stenosis in the  cervical right internal carotid artery. There is no evidence of significant  stenosis in the cervical left internal carotid artery.  Carotid bulbs plaques  without significant luminal narrowing.  % of right carotid artery stenosis: 0  % of left carotid artery stenosis: 0    There is a codominant vertebrobasilar arterial system. The cervical vertebral  arteries are normal in course, size and contour without significant stenosis. Dental amalgam associated metallic artifact precludes optimal evaluation of the  adjacent structures, oral cavity and oropharynx. Maxillofacial reconstruction  hardware along bilateral anterior maxillary wall. The orbits are bilaterally  unremarkable and without obvious mass lesion. Visualized soft tissues of the  skull base and neck are unremarkable. Visualized paranasal sinuses and mastoids  are patent. Visualized lung apices are clear. No acute fracture or aggressive osseous lesion. Degenerative changes of the  cervical spine. Impression  CTA  1. CTA neck demonstrates no large vessel occlusion, high-grade stenosis or  aneurysm. 2. 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 Findings may represent moyamoya  disease versus chronic atherosclerotic occlusion. Correlate with brain MRI to  exclude acute infarct. 3. No intracranial aneurysm or obvious intraorbital mass lesion. head CT from December 9, 2022. There is no acute abnormality. CTA of the head and neck revealed no large vessel flow-limiting stenosis. There was possible occlusion of the left MCA with small vessel collateralization. Brain MRI was recommended    brain MRI from December 10, 2022. There is no clear evidence of an acute stroke. There is subtle signal abnormality in the left thalamus which may resent a subacute to chronic stroke. Contrasted image on 12/11/2022 with no abnormal enhancement. I spent  45  minutes providing care to this  acutely ill inpatient with > 50% of the time counseling and assisting in the coordination of care of the patient on the patient's hospital floor/unit.          Active Problems:    Cranial nerve VI palsy (12/10/2022)                 Signed By:  Cachorro Ley DO FAAN    December 13, 2022

## 2022-12-13 NOTE — PROGRESS NOTES
Hospitalist Progress Note    NAME: Rachael Villalobos   :  1960   MRN:  [id number]       Assessment / Plan:  1400 pt requesting additional pain medications states fiorcet not helpful   Patient for lumbar puncture to rule out thyroid component of left palsy.  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 Small areas of signal abnormality in the medial left thalamus and caudate/corona radiata inferiorly may represent subacute/chronic infarct particularly given chronic   Appreciate Neuro input   -LP  with high protein, neurology recommended high dose steroid therapy for possible inflammatory etiology, will monitor blood sugar closely and start the patient on sliding scale Humalog  -Pending HSV, CSF Lyme, MS panel  -MRI brain with contrast showed nonspecific sequelae of prior ischemia/infarct. 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   Possibly representing moyamoya disease versus chronic atherosclerotic occlusion  CT of the head without acute intracranial process  Lipid panel  Trigly - 348 LDL 56 pt on Atorvastin   Hgb A1c 5.7      Essential hypertension  - resume Coreg , pt states not taking Lisinopril or Spironolactone   - Hydralazine for systolic blood pressure greater 170  - Blood pressures improved systolic blood pressure 623E overnight  -Continue with Coreg for now     Depression  Continue Wellbutrin      History of Lyme disease/ bartonella /babesia  Stable and treated    -Bladder spasm  Continue Myrbetriq or equivalent       30.0 - 39.9 Obese / Body mass index is 30.02 kg/m². Estimated discharge date: December 13  Barriers: Lumbar puncture     Code status: Full  Prophylaxis:  ASA /SCDs  Recommended Disposition: Home w/Family     Subjective:     Chief Complaint / Reason for Physician Visit  \"Pt reports improvement in her headache. Denies any new complaints. Reports feeling improved overall. Denies fever, chills, chest pain, shortness of breath abdominal pain, vomiting and diarrhea. No overnight events reported by nursing staff. Review of Systems:  Symptom Y/N Comments  Symptom Y/N Comments   Fever/Chills n   Chest Pain n    Poor Appetite    Edema     Cough    Abdominal Pain     Sputum    Joint Pain     SOB/HEMPHILL n   Pruritis/Rash     Nausea/vomit    Tolerating PT/OT     Diarrhea    Tolerating Diet y    Constipation    Other headache y      Could NOT obtain due to:      Objective:     VITALS:   Last 24hrs VS reviewed since prior progress note. Most recent are:  Patient Vitals for the past 24 hrs:   Temp Pulse Resp BP SpO2   12/13/22 1154 98.4 °F (36.9 °C) 67 16 (!) 155/63 95 %   12/13/22 0810 97.5 °F (36.4 °C) 77 16 (!) 171/70 97 %   12/13/22 0426 97.5 °F (36.4 °C) 75 -- (!) 133/54 97 %   12/12/22 2304 97.7 °F (36.5 °C) 72 16 (!) 116/56 96 %   12/12/22 2036 97.5 °F (36.4 °C) 75 17 118/67 97 %   12/12/22 1607 97.7 °F (36.5 °C) 72 16 132/66 99 %       No intake or output data in the 24 hours ending 12/13/22 1331       I had a face to face encounter and independently examined this patient on 12/13/2022, as outlined below:  PHYSICAL EXAM:  General: WD, WN. Alert, cooperative, no acute distress    EENT:  diplopia Anicteric sclerae. MMM  Resp:  CTA bilaterally, no wheezing or rales. No accessory muscle use  CV:  Regular  rhythm,  No edema  GI:  Soft, Non distended, Non tender. +Bowel sounds  Neurologic:  Alert and oriented X 3, normal speech,   Psych:   Good insight. Not anxious nor agitated  Skin:  No rashes. No jaundice    Reviewed most current lab test results and cultures  YES  Reviewed most current radiology test results   YES  Review and summation of old records today    NO  Reviewed patient's current orders and MAR    YES  PMH/SH reviewed - no change compared to H&P  ________________________________________________________________________  Care Plan discussed with:    Comments   Patient x    Family      RN x    Care Manager     Consultant                        Multidiciplinary team rounds were held today with , nursing, pharmacist and clinical coordinator. Patient's plan of care was discussed; medications were reviewed and discharge planning was addressed. ________________________________________________________________________  Total NON critical care TIME:  30   Minutes    Total CRITICAL CARE TIME Spent:   Minutes non procedure based      Comments   >50% of visit spent in counseling and coordination of care     ________________________________________________________________________  Mic Yang DO     Procedures: see electronic medical records for all procedures/Xrays and details which were not copied into this note but were reviewed prior to creation of Plan. LABS:  I reviewed today's most current labs and imaging studies. Pertinent labs include:  Recent Labs     12/13/22 0059 12/12/22  0307 12/11/22  0400   WBC 7.4 9.5 5.9   HGB 12.5 12.9 12.2   HCT 38.0 40.6 37.3    301 282       Recent Labs     12/13/22 0059 12/12/22  0307 12/11/22  0400    140 142   K 3.6 3.7 3.7    101 105   CO2 30 30 28   * 129* 117*   BUN 19 20 16   CREA 1.21* 1.21* 0.96   CA 9.0 9.2 9.2   ALB 3.5 3.5 3.3*   TBILI 0.5 0.5 0.5   ALT 26 25 24         Signed:  Mic Yang, DO

## 2022-12-14 LAB
ACHR AB SER-SCNC: <0.03 NMOL/L (ref 0–0.24)
ACHR BLOCK AB SER-ACNC: 12 % (ref 0–25)
ACHR MOD AB/ACHR TOTAL SFR SER: NORMAL %
ALBUMIN SERPL-MCNC: 3.5 G/DL (ref 3.5–5)
ALBUMIN/GLOB SERPL: 0.9 {RATIO} (ref 1.1–2.2)
ALP SERPL-CCNC: 130 U/L (ref 45–117)
ALT SERPL-CCNC: 29 U/L (ref 12–78)
ANION GAP SERPL CALC-SCNC: 9 MMOL/L (ref 5–15)
AQP4 H2O CHANNEL IGG SERPL IA-ACNC: <1.5 U/ML (ref 0–3)
AST SERPL-CCNC: 14 U/L (ref 15–37)
BASOPHILS # BLD: 0 K/UL (ref 0–0.1)
BASOPHILS NFR BLD: 0 % (ref 0–1)
BILIRUB SERPL-MCNC: 0.4 MG/DL (ref 0.2–1)
BUN SERPL-MCNC: 20 MG/DL (ref 6–20)
BUN/CREAT SERPL: 16 (ref 12–20)
CALCIUM SERPL-MCNC: 9.2 MG/DL (ref 8.5–10.1)
CHLORIDE SERPL-SCNC: 102 MMOL/L (ref 97–108)
CO2 SERPL-SCNC: 26 MMOL/L (ref 21–32)
CREAT SERPL-MCNC: 1.23 MG/DL (ref 0.55–1.02)
DIFFERENTIAL METHOD BLD: ABNORMAL
EOSINOPHIL # BLD: 0 K/UL (ref 0–0.4)
EOSINOPHIL NFR BLD: 0 % (ref 0–7)
ERYTHROCYTE [DISTWIDTH] IN BLOOD BY AUTOMATED COUNT: 12.9 % (ref 11.5–14.5)
GLOBULIN SER CALC-MCNC: 3.9 G/DL (ref 2–4)
GLUCOSE BLD STRIP.AUTO-MCNC: 165 MG/DL (ref 65–117)
GLUCOSE BLD STRIP.AUTO-MCNC: 179 MG/DL (ref 65–117)
GLUCOSE BLD STRIP.AUTO-MCNC: 182 MG/DL (ref 65–117)
GLUCOSE BLD STRIP.AUTO-MCNC: 197 MG/DL (ref 65–117)
GLUCOSE SERPL-MCNC: 212 MG/DL (ref 65–100)
HCT VFR BLD AUTO: 37.9 % (ref 35–47)
HGB BLD-MCNC: 12.4 G/DL (ref 11.5–16)
IMM GRANULOCYTES # BLD AUTO: 0 K/UL (ref 0–0.04)
IMM GRANULOCYTES NFR BLD AUTO: 1 % (ref 0–0.5)
LYMPHOCYTES # BLD: 1 K/UL (ref 0.8–3.5)
LYMPHOCYTES NFR BLD: 13 % (ref 12–49)
MCH RBC QN AUTO: 30 PG (ref 26–34)
MCHC RBC AUTO-ENTMCNC: 32.7 G/DL (ref 30–36.5)
MCV RBC AUTO: 91.5 FL (ref 80–99)
MONOCYTES # BLD: 0 K/UL (ref 0–1)
MONOCYTES NFR BLD: 0 % (ref 5–13)
NEUTS SEG # BLD: 6.1 K/UL (ref 1.8–8)
NEUTS SEG NFR BLD: 86 % (ref 32–75)
NRBC # BLD: 0 K/UL (ref 0–0.01)
NRBC BLD-RTO: 0 PER 100 WBC
PLATELET # BLD AUTO: 295 K/UL (ref 150–400)
PMV BLD AUTO: 9.7 FL (ref 8.9–12.9)
POTASSIUM SERPL-SCNC: 3.8 MMOL/L (ref 3.5–5.1)
PROT SERPL-MCNC: 7.4 G/DL (ref 6.4–8.2)
RBC # BLD AUTO: 4.14 M/UL (ref 3.8–5.2)
SERVICE CMNT-IMP: ABNORMAL
SODIUM SERPL-SCNC: 137 MMOL/L (ref 136–145)
WBC # BLD AUTO: 7.2 K/UL (ref 3.6–11)

## 2022-12-14 PROCEDURE — 74011000258 HC RX REV CODE- 258: Performed by: PSYCHIATRY & NEUROLOGY

## 2022-12-14 PROCEDURE — 97530 THERAPEUTIC ACTIVITIES: CPT

## 2022-12-14 PROCEDURE — 74011250637 HC RX REV CODE- 250/637: Performed by: PHYSICIAN ASSISTANT

## 2022-12-14 PROCEDURE — 74011000250 HC RX REV CODE- 250: Performed by: PHYSICIAN ASSISTANT

## 2022-12-14 PROCEDURE — 36415 COLL VENOUS BLD VENIPUNCTURE: CPT

## 2022-12-14 PROCEDURE — 82962 GLUCOSE BLOOD TEST: CPT

## 2022-12-14 PROCEDURE — 97116 GAIT TRAINING THERAPY: CPT

## 2022-12-14 PROCEDURE — 99233 SBSQ HOSP IP/OBS HIGH 50: CPT | Performed by: PSYCHIATRY & NEUROLOGY

## 2022-12-14 PROCEDURE — 74011636637 HC RX REV CODE- 636/637: Performed by: STUDENT IN AN ORGANIZED HEALTH CARE EDUCATION/TRAINING PROGRAM

## 2022-12-14 PROCEDURE — 80053 COMPREHEN METABOLIC PANEL: CPT

## 2022-12-14 PROCEDURE — 65270000046 HC RM TELEMETRY

## 2022-12-14 PROCEDURE — 85025 COMPLETE CBC W/AUTO DIFF WBC: CPT

## 2022-12-14 PROCEDURE — 74011250637 HC RX REV CODE- 250/637: Performed by: NURSE PRACTITIONER

## 2022-12-14 PROCEDURE — 74011250636 HC RX REV CODE- 250/636: Performed by: PSYCHIATRY & NEUROLOGY

## 2022-12-14 RX ADMIN — TROSPIUM CHLORIDE 20 MG: 20 TABLET, FILM COATED ORAL at 09:46

## 2022-12-14 RX ADMIN — Medication 1 UNITS: at 13:11

## 2022-12-14 RX ADMIN — ASPIRIN 325 MG ORAL TABLET 325 MG: 325 PILL ORAL at 09:46

## 2022-12-14 RX ADMIN — SODIUM CHLORIDE, PRESERVATIVE FREE 10 ML: 5 INJECTION INTRAVENOUS at 13:12

## 2022-12-14 RX ADMIN — ACETAMINOPHEN 650 MG: 325 TABLET ORAL at 17:31

## 2022-12-14 RX ADMIN — SODIUM CHLORIDE 1000 MG: 900 INJECTION INTRAVENOUS at 09:47

## 2022-12-14 RX ADMIN — HYDROXYZINE HYDROCHLORIDE 25 MG: 25 TABLET, FILM COATED ORAL at 23:10

## 2022-12-14 RX ADMIN — GUAIFENESIN 600 MG: 600 TABLET, EXTENDED RELEASE ORAL at 09:46

## 2022-12-14 RX ADMIN — Medication 1 UNITS: at 09:45

## 2022-12-14 RX ADMIN — FUROSEMIDE 40 MG: 40 TABLET ORAL at 09:46

## 2022-12-14 RX ADMIN — HYDROMORPHONE HYDROCHLORIDE 1 MG: 2 TABLET ORAL at 17:31

## 2022-12-14 RX ADMIN — CARVEDILOL 25 MG: 12.5 TABLET, FILM COATED ORAL at 09:47

## 2022-12-14 RX ADMIN — HYDROMORPHONE HYDROCHLORIDE 1 MG: 2 TABLET ORAL at 12:25

## 2022-12-14 RX ADMIN — HYDROMORPHONE HYDROCHLORIDE 1 MG: 2 TABLET ORAL at 21:55

## 2022-12-14 RX ADMIN — TROSPIUM CHLORIDE 20 MG: 20 TABLET, FILM COATED ORAL at 17:31

## 2022-12-14 RX ADMIN — HYDROMORPHONE HYDROCHLORIDE 1 MG: 2 TABLET ORAL at 06:40

## 2022-12-14 RX ADMIN — BUPROPION HYDROCHLORIDE 150 MG: 150 TABLET, EXTENDED RELEASE ORAL at 09:00

## 2022-12-14 RX ADMIN — MELATONIN 3 MG: at 21:56

## 2022-12-14 RX ADMIN — GUAIFENESIN 600 MG: 600 TABLET, EXTENDED RELEASE ORAL at 21:56

## 2022-12-14 RX ADMIN — SODIUM CHLORIDE, PRESERVATIVE FREE 10 ML: 5 INJECTION INTRAVENOUS at 22:00

## 2022-12-14 RX ADMIN — HYDROMORPHONE HYDROCHLORIDE 1 MG: 2 TABLET ORAL at 01:39

## 2022-12-14 RX ADMIN — DULOXETINE HYDROCHLORIDE 60 MG: 30 CAPSULE, DELAYED RELEASE ORAL at 09:46

## 2022-12-14 RX ADMIN — ATORVASTATIN CALCIUM 40 MG: 40 TABLET, FILM COATED ORAL at 09:46

## 2022-12-14 RX ADMIN — CETIRIZINE HYDROCHLORIDE 10 MG: 10 TABLET, FILM COATED ORAL at 09:47

## 2022-12-14 RX ADMIN — DULOXETINE HYDROCHLORIDE 60 MG: 30 CAPSULE, DELAYED RELEASE ORAL at 17:31

## 2022-12-14 RX ADMIN — Medication 1 UNITS: at 22:00

## 2022-12-14 RX ADMIN — Medication 1 UNITS: at 17:31

## 2022-12-14 RX ADMIN — SODIUM CHLORIDE, PRESERVATIVE FREE 10 ML: 5 INJECTION INTRAVENOUS at 06:00

## 2022-12-14 NOTE — PROGRESS NOTES
Problem: Mobility Impaired (Adult and Pediatric)  Goal: *Acute Goals and Plan of Care (Insert Text)  Description: FUNCTIONAL STATUS PRIOR TO ADMISSION: Patient was independent and active without use of DME. Patient was independent for basic and instrumental ADLs. Over the last 48 hours prior to admission had worsening double vision due to cranial nerve 6 palsy. HOME SUPPORT PRIOR TO ADMISSION: The patient lived with spouse but did not require assist.    Physical Therapy Goals  Initiated 12/10/2022  1. Patient will move from supine to sit and sit to supine , scoot up and down, and roll side to side in bed with independence within 7 day(s). 2.  Patient will transfer from bed to chair and chair to bed with independence using the least restrictive device within 7 day(s). 3.  Patient will perform sit to stand with independence within 7 day(s). 4.  Patient will ambulate with independence for 450 feet with the least restrictive device within 7 day(s). 5.  Patient will ascend/descend 12 stairs with 1 handrail(s) with modified independence within 7 day(s). 12/14/2022 1539 by Leni Castillo, PT  Outcome: Progressing Towards Goal  PHYSICAL THERAPY TREATMENT  Patient: Dylan Titus (38 y.o. female)  Date: 12/14/2022  Diagnosis: Cranial nerve VI palsy [H49.20] <principal problem not specified>      Precautions:    Chart, physical therapy assessment, plan of care and goals were reviewed. ASSESSMENT  Patient continues with skilled PT services and is progressing towards goals. Able to progress gait distance. Trialed rolling walker vs no device and patient running into objects with device. Would do best with assist/supervision from someone rather than use of device. Continues to be impacted by vision/depth perception impacting her balance. She reports vision has not improved so current techniques focusing on compensation and hoping vision improves. Will likely do better in own familiar environment.  Wearing eye patch and switching between eyes - reports double vision without patch. Would continue to benefit from Outpatient PT for higher level balance training in setting of visual deficits. Acute PT will continue to follow and progress as able. Current Level of Function Impacting Discharge (mobility/balance): stand-by-assist with gait    Other factors to consider for discharge: reports no improvement in vision         PLAN :  Patient continues to benefit from skilled intervention to address the above impairments. Continue treatment per established plan of care. to address goals. Recommendation for discharge: (in order for the patient to meet his/her long term goals)  Outpatient physical therapy follow up recommended for higher level balance training in setting of visual deficits    This discharge recommendation:  Has been made in collaboration with the attending provider and/or case management    IF patient discharges home will need the following DME: none     SUBJECTIVE:   Patient stated I have been getting to the  bathroom on my own. I'm steady and careful I hold onto things.     OBJECTIVE DATA SUMMARY:   Critical Behavior:  Neurologic State: Alert  Orientation Level: Oriented X4  Cognition: Appropriate decision making  Safety/Judgement: Awareness of environment, Good awareness of safety precautions, Decreased insight into deficits  Functional Mobility Training:  Bed Mobility:  Supine to Sit: Independent  Sit to Supine: Independent  Scooting: Independent  Transfers:  Sit to Stand: Independent   Balance:  Sitting: Intact  Standing: Impaired  Standing - Static: Good  Standing - Dynamic : Fair  Ambulation/Gait Training:  Distance (ft): 100 Feet (ft) (x 2)  Assistive Device: Gait belt (no device first gait distance, RW second gait distance)  Ambulation - Level of Assistance: Supervision;Stand-by assistance        Gait Abnormalities: Altered arm swing;Path deviations (tendency to veer to R. attempted with RW on second attempt and patient running into objects)        Base of Support: Narrowed     Speed/Carmen: Fluctuations  Step Length: Left shortened;Right shortened                  Pain Rating:  Did not interfere    Activity Tolerance:   Fair    After treatment patient left in no apparent distress:   Supine in bed and Call bell within reach, left as found (refusing bed alarm - noted on board)    COMMUNICATION/COLLABORATION:   The patients plan of care was discussed with: Occupational therapist and Registered nurse.      Kristin Reis, PT   Time Calculation: 26 mins

## 2022-12-14 NOTE — PROGRESS NOTES
Spiritual Care Assessment/Progress Note  [facility]      NAME: Lam Evans      MRN: [id number]  AGE: 63 y.o.  SEX: female  Oriental orthodox Affiliation: [place]   Language: English     12/14/2022     Total Time (in minutes): 21     Spiritual Assessment begun in [facility] through conversation with:         [x]Patient        [x] Family    [] Friend(s)        Reason for Consult: Initial/Spiritual assessment, patient floor     Spiritual beliefs: (Please include comment if needed)     [] Identifies with a yoan tradition:         [] Supported by a yoan community:            [] Claims no spiritual orientation:           [] Seeking spiritual identity:                [] Adheres to an individual form of spirituality:           [] Not able to assess:                           Identified resources for coping:      [] Prayer                               [] Music                  [] Guided Imagery     [x] Family/friends                 [] Pet visits     [] Devotional reading                         [] Unknown     [] Other:                                                Interventions offered during this visit: (See comments for more details)    Patient Interventions: Coping skills reviewed/reinforced, Initial/Spiritual assessment, patient floor, Life review/legacy, Normalization of emotional/spiritual concerns, Prayer (assurance of), Integration of medical assessment with existing values and beliefs, Affirmation of yoan, Catharsis/review of pertinent events in supportive environment, Iconic (affirming the presence of God/Higher Power)           Plan of Care:     [] Support spiritual and/or cultural needs    [] Support AMD and/or advance care planning process      [] Support grieving process   [] Coordinate Rites and/or Rituals    [] Coordination with community clergy   [] No spiritual needs identified at this time   [] Detailed Plan of Care below (See Comments)  [] Make referral to Music Therapy  [] Make referral to Pet Therapy     [] Make referral to Addiction services  [] Make referral to [facility]  [] Make referral to Spiritual Care Partner  [] No future visits requested        [x] Contact Spiritual Care for further referrals     Comments:   visited for initial spiritual assessment in 3121. Patient seem in good spirit and shared her progress so far with her hospitalization. She was very grateful and appreciative of care she's received. Her mom and a brother were present visiting, and she shared that their visit was very comforting. They are all people of yoan.  offered listening presence and affirmation and celebrated her progress. She expressed no need for support at this time, she was grateful for the visit.        Visited by: Gemma dotson: Jasmin Bruno  ([id number])

## 2022-12-14 NOTE — PROGRESS NOTES
Neurology Note    Patient ID:  Shahnaz Betancourt  [id number]  85 y.o.  1960      Date of Consultation:  December 14, 2022      Assessment and Plan:    The patient is a 82-year-old female with a subacute onset of diplopia after developing a recent viral illness. 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 No improvement in her diplopia    Diplopia:    Differential includes ischemic 6th nerve, infectious, inflammatory, autoimmune  Brain MRI with no obvious etiology. MRI negative for an acute stroke. Follow-up brain MRI with contrast reveals no significant inflammatory or infectious etiology. Lumbar puncture performed. Cell count normal.  Protein elevated. Hsv pending, csf lyme pending,  ms panel pending  Serum nmo and anti mog pending  CTA with no aneursymal compression    Serum lyme was negative, mg panel prelim negative, tsh normal, ace normal, anti-ds dna normal, ssa/ssb negative, veto panel negative, hsv negative    She will continue to wear the eye patch for the time being. Given the results of high csf protein and no cells, will give patient 3 days of high dose steroids for a possible inflammatory/autoimmune etiology. I discussed the rationale with the patient today. Please monitor glucose levels during high dose steroids. Today is day 2    Htn: maintain sbp < 140  Continue aspirin 81 mg. Hemoglobin a1c was at goal - 5.7  Ldl: 56, triglycerides  348    Sinus inflammation noted on imaging:     on decongestant  Depression  Bladder spasm  Headache: treatment through primary team      Neurological continue to follow along closely            Subjective: my vision is still double, but slightly better when I look close-up     History of Present Illness:   Shahnaz Betancourt is a 63 y.o. female with a history of hypertension and dyslipidemia who presented to the emergency department due to double vision. Extensive work-up today without a clear etiology.   She did receive her first dose of IV steroids without any difficulty yesterday. She still does have the double vision. She does feel slightly better when walking. Today she could be on her phone without needing to wear her eye patch. She does continue to have severe neck and shoulder pain requiring doses of pain medication.   This is being managed through her primary team    Past Medical History:   Diagnosis Date    Asthma     Heart failure ([address]     Hypercholesteremia     Hypertension     Ill-defined condition     Psoriasis    The patient reports of having had a history of Lyme disease as well as other tick borne disease in the past that was quite severe is causing her to be wheelchair-bound for an extended period of time    Past Surgical History:   Procedure Laterality Date    HX OTHER SURGICAL Bilateral     JAW plates    HX OTHER SURGICAL Right     Ankle with screws and plates        Family History   Problem Relation Age of Onset    Heart Disease Father     Diabetes Father     Hypertension Father     Kidney Disease Father     High Cholesterol Father         Social History     Tobacco Use    Smoking status: Former     Types: Cigarettes     Quit date: 5/15/2007     Years since quitting: 15.5    Smokeless tobacco: Never   Substance Use Topics    Alcohol use: No        Allergies   Allergen Reactions    Gold Au 198 Rash          Current Facility-Administered Medications   Medication Dose Route Frequency    methylPREDNISolone (Solu-MEDROL) 1 g in 100 mL NS MBP  1,000 mg IntraVENous DAILY    insulin lispro (HUMALOG) injection   SubCUTAneous AC&HS    HYDROmorphone (DILAUDID) tablet 1 mg  1 mg Oral Q4H PRN    aspirin tablet 325 mg  325 mg Oral DAILY    atorvastatin (LIPITOR) tablet 40 mg  40 mg Oral DAILY    sodium chloride (NS) flush 5-40 mL  5-40 mL IntraVENous Q8H    sodium chloride (NS) flush 5-40 mL  5-40 mL IntraVENous PRN    acetaminophen (TYLENOL) tablet 650 mg  650 mg Oral Q6H PRN    Or    acetaminophen (TYLENOL) suppository 650 mg  650 mg Rectal Q6H PRN    polyethylene glycol (MIRALAX) packet 17 g  17 g Oral DAILY PRN    ondansetron (ZOFRAN ODT) tablet 4 mg  4 mg Oral Q8H PRN    Or    ondansetron (ZOFRAN) injection 4 mg  4 mg IntraVENous Q6H PRN    buPROPion ER (ZYBAN,BUPROBAN) tablet 150 mg  150 mg Oral DAILY    cetirizine (ZYRTEC) tablet 10 mg  10 mg Oral DAILY    DULoxetine (CYMBALTA) capsule 60 mg  60 mg Oral BID    furosemide (LASIX) tablet 40 mg  40 mg Oral DAILY    trospium (SANCTURA) tablet 20 mg  20 mg Oral BID    hydrOXYzine HCL (ATARAX) tablet 25 mg  25 mg Oral TID PRN    hydrALAZINE (APRESOLINE) 20 mg/mL injection 20 mg  20 mg IntraVENous Q6H PRN    melatonin tablet 3 mg  3 mg Oral QHS PRN    butalbital-acetaminophen-caffeine (FIORICET, ESGIC) -40 mg per tablet 1 Tablet  1 Tablet Oral Q4H PRN    guaiFENesin ER (MUCINEX) tablet 600 mg  600 mg Oral Q12H    carvediloL (COREG) tablet 25 mg  25 mg Oral DAILY       Review of Systems:    General, constitutional:head congestion  Eyes, vision: negative  Ears, nose, throat: negative  Cardiovascular, heart: negative  Respiratory: negative  Gastrointestinal: negative  Genitourinary: negative  Musculoskeletal: negative  Skin and integumentary: negative  Psychiatric: negative  Endocrine: negative  Neurological: negative, except for HPI  Hematologic/lymphatic: negative  Allergy/immunology: negative    Objective:     Visit Vitals  BP (!) 120/59   Pulse 88   Temp 97.7 °F (36.5 °C)   Resp 16   Ht 5' 6\" (1.676 m)   Wt 186 lb (84.4 kg)   SpO2 97%   BMI 30.02 kg/m²       Physical Exam:    General:  appears well nourished in no acute distress  Neck: no carotid bruits  Lungs: clear to auscultation  Heart:  no murmurs, regular rate  Lower extremity: peripheral pulses palpable and no edema  Skin: intact    Neurological exam:    Awake, alert, oriented to person, place and time  Recent and remote memory were normal  Attention and concentration were intact  Language was intact.   There was no aphasia  Speech: no dysarthria  Fund of knowledge was preserved    Cranial nerves:   II-XII were tested    Perrrla  Visual fields were full  She does have a disconjugate primary gaze with intorsion of the left. She cannot fully abduct her left eye. It appears to be a incomplete 6th nerve palsy. persisting  There was no ptosis. There was no fatigable ptosis  Facial sensation:  normal and symmetric  Facial motor: normal and symmetric  Hearing intact  SCM strength intact  Tongue: midline without fasciculations    Motor: Tone normal  Pronator drift was absent    No evidence of fasciculations    Strength testing:   deltoid triceps biceps Wrist ext. Wrist flex. intrinsics Hip flex. Hip ext. Knee ext. Knee flex Dorsi flex Plantar flex   Right 5 5 5 5 5 5 5 5 5 5 5 5   Left 5 5 5 5 5 5 5 5 5 5 5 5       Sensory:  Upper extremity: intact to pp,  Lower extremity: intact to pp,    Reflexes:    Right Left  Biceps  2 2  Triceps     2 2  Brachiorad. 2 2  Patella  1 1  Achilles 1 1  Cerebellar testing:  no tremor apparent, finger/nose and rachell were intact    Gait:not assessed due to concerns over safety. Labs:     Lab Results   Component Value Date/Time    Hemoglobin A1c 5.7 (H) 12/11/2022 04:00 AM    Sodium 137 12/14/2022 01:36 AM    Potassium 3.8 12/14/2022 01:36 AM    Chloride 102 12/14/2022 01:36 AM    Glucose 212 (H) 12/14/2022 01:36 AM    BUN 20 12/14/2022 01:36 AM    Creatinine 1.23 (H) 12/14/2022 01:36 AM    Calcium 9.2 12/14/2022 01:36 AM    WBC 7.2 12/14/2022 01:36 AM    HCT 37.9 12/14/2022 01:36 AM    HGB 12.4 12/14/2022 01:36 AM    PLATELET 891 86/26/8180 01:36 AM       Imaging:    Results from [place] encounter on 12/10/22    MRI BRAIN W WO CONT    Narrative  EXAM:  MRI BRAIN W WO CONT    INDICATION: 24-year-old female with abnormal findings on non contrast brain MRI. Patient now returns for postcontrast study. COMPARISON:  12/10/2022 brain MRI. CONTRAST: 15 ml ProHance.     TECHNIQUE:  Multiplanar multisequence acquisition without and with contrast of the brain. FINDINGS:  Corresponding to previously demonstrated areas of FLAIR/T2 hyperintense signal  abnormality in the inferomedial left lateral ventricular wall, there is no  associated significant contrast enhancement or obvious mass lesion. The findings  are much less conspicuous compared to prior study. The ventricles sizes and  configuration are within normal limits. There is no acute infarct, hemorrhage,  extra-axial fluid collection, or mass effect. There is no cerebellar tonsillar  herniation. Expected arterial flow-voids are present. Basal CSF cisterns are  patent. Mild diffuse mucosal thickening of the paranasal sinuses. There is moderate to  large left and moderate right mastoid effusions, similar to prior study. The  orbital contents are within normal limits. No significant osseous or scalp  lesions are identified. Impression  Previously demonstrated T2/FLAIR signal abnormality about juxtapleural ventricle  left caudate head/thalamus is much less conspicuous on current study demonstrate  no associated contrast enhancement. These are nonspecific and may represent  sequelae of prior ischemia/infarct. Attention on follow-up is advised. Bilateral mastoid effusions. Results from [place] encounter on 12/09/22    CTA HEAD NECK W CONT    Narrative  EXAM:  CTA HEAD NECK W CONT    INDICATION:   cva; left eye cn 6 palsy    COMPARISON:  None. CONTRAST:  100 mL of Isovue-370. TECHNIQUE:  Unenhanced  images were obtained to localize the volume for  acquisition. Multislice helical axial CT angiography was performed from the  aortic arch to the top of the head during uneventful rapid bolus intravenous  contrast administration. Coronal and sagittal reformations and 3D/MIP  post  processing were performed.   CT dose reduction was achieved through use of a standardized protocol tailored  for this examination and automatic exposure control for dose modulation. This  study was analyzed by the [address] ai algorithm. FINDINGS:    CTA Head:  Proximal occlusion of the right MCA, likely chronic with extensive small vessel  collaterals formation. There is no evidence of large vessel occlusion or flow-limiting stenosis of the  intracranial internal carotid, anterior cerebral, and right middle cerebral  arteries. Calcification of the bilateral carotid siphons without significant  stenosis. The anterior communicating artery is patent. There is no evidence of large vessel occlusion or flow-limiting stenosis of the  intracranial vertebral arteries, basilar artery, or posterior cerebral arteries. There is no definite posterior communicating arteries identified. There is no evidence of aneurysm or vascular malformation. The dural venous  sinuses and deep cerebral venous system are patent. No evidence of abnormal  enhancement on delayed phase images. Brain parenchyma demonstrates no suggestion of acute infarct. CTA NECK:  NASCET method was utilized for calculating stenosis. The aortic arch is unremarkable. The common carotid arteries demonstrate no  significant stenosis. There is no evidence of significant stenosis in the  cervical right internal carotid artery. There is no evidence of significant  stenosis in the cervical left internal carotid artery. Carotid bulbs plaques  without significant luminal narrowing.  % of right carotid artery stenosis: 0  % of left carotid artery stenosis: 0    There is a codominant vertebrobasilar arterial system. The cervical vertebral  arteries are normal in course, size and contour without significant stenosis. Dental amalgam associated metallic artifact precludes optimal evaluation of the  adjacent structures, oral cavity and oropharynx. Maxillofacial reconstruction  hardware along bilateral anterior maxillary wall. The orbits are bilaterally  unremarkable and without obvious mass lesion. Visualized soft tissues of the  skull base and neck are unremarkable. Visualized paranasal sinuses and mastoids  are patent. Visualized lung apices are clear. No acute fracture or aggressive osseous lesion. Degenerative changes of the  cervical spine. Impression  CTA  1. CTA neck demonstrates no large vessel occlusion, high-grade stenosis or  aneurysm. 2. 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 Findings may represent moyamoya  disease versus chronic atherosclerotic occlusion. Correlate with brain MRI to  exclude acute infarct. 3. No intracranial aneurysm or obvious intraorbital mass lesion. head CT from December 9, 2022. There is no acute abnormality. CTA of the head and neck revealed no large vessel flow-limiting stenosis. There was possible occlusion of the left MCA with small vessel collateralization. Brain MRI was recommended    brain MRI from December 10, 2022. There is no clear evidence of an acute stroke. There is subtle signal abnormality in the left thalamus which may resent a subacute to chronic stroke. Contrasted image on 12/11/2022 with no abnormal enhancement. I spent  35  minutes providing care to this  acutely ill inpatient with > 50% of the time counseling and assisting in the coordination of care of the patient on the patient's hospital floor/unit.          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

## 2022-12-14 NOTE — PROGRESS NOTES
End of Shift Note    Bedside shift change report given to ,RN (oncoming nurse) by Ester Moscoso RN (offgoing nurse). Report included the following information SBAR and Kardex    Shift worked:  7p-7a     Shift summary and any significant changes:     Pt rested well over night. Medicated for pain x3 for headache. Pt requesting to stick with the q 4 hr pain regimen schedule. Call bell in reach and comfort and safety maintained. Concerns for physician to address:  na     Zone phone for oncoming shift:   [phone]       Activity:  Activity Level: Up with Assistance  Number times ambulated in hallways past shift: 0  Number of times OOB to chair past shift: 0    Cardiac:   Cardiac Monitoring: Yes      Cardiac Rhythm: Sinus Rhythm    Access:  Current line(s): PIV     Genitourinary:   Urinary status: voiding    Respiratory:   O2 Device: None (Room air)  Chronic home O2 use?: NO  Incentive spirometer at bedside: NO       GI:  Last Bowel Movement Date: 12/09/22  Current diet:  ADULT DIET Regular  Passing flatus: YES  Tolerating current diet: YES       Pain Management:   Patient states pain is manageable on current regimen: YES    Skin:  Danilo Score: 18  Interventions: increase time out of bed and PT/OT consult    Patient Safety:  Fall Score:  Total Score: 3  Interventions: stay with me (per policy)  High Fall Risk: Yes    Length of Stay:  Expected LOS: 2d 0h  Actual LOS: [address], RN

## 2022-12-14 NOTE — PROGRESS NOTES
Bedside shift report given to oncoming nurse Jose J Waterman RN off going nurse Yuliet DAVILA patient stable during shift change will continue to assess pt     1900  Bedside shift report given to oncoming nurse [facility] RN by off going nurse Jose J Waterman RN patient stable during shift change patient alert with no distress noted

## 2022-12-14 NOTE — PROGRESS NOTES
Hospitalist Progress Note    NAME: Ninfa Montoya   :  1960   MRN:  [id number]       Assessment / Plan:  1400 pt requesting additional pain medications states fiorcet not helpful   Patient for lumbar puncture to rule out thyroid component of left palsy.  -Respiratory viral infection  Has been present for approximately 2 weeks with sinus congestion  CT of the head without obvious sinus congestion  Negative for COVID or influenza  Decongestant ordered   CXR no acute procss     -Diplopia, suspect secondary to inflammatory etiology, no CVA  MRI showed No acute infarction. Small areas of signal abnormality in the medial left thalamus and caudate/corona radiata inferiorly may represent subacute/chronic infarct particularly given chronic   Appreciate Neuro input   -LP  with high protein, neurology recommended high dose steroid therapy for possible inflammatory etiology, will monitor blood sugar closely and start the patient on sliding scale Humalog  -negative HSV, pending CSF Lyme, MS panel  -MRI brain with contrast showed nonspecific sequelae of prior ischemia/infarct. 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   Possibly representing moyamoya disease versus chronic atherosclerotic occlusion  CT of the head without acute intracranial process  Lipid panel  Trigly - 348 LDL 56 pt on Atorvastin   Hgb A1c 5.7      Essential hypertension  - resume Coreg , pt states not taking Lisinopril or Spironolactone   - Hydralazine for systolic blood pressure greater 170  - Blood pressures improved systolic blood pressure 062L overnight  -Continue with Coreg for now     Depression  Continue Wellbutrin      History of Lyme disease/ bartonella /babesia  Stable and treated    -Bladder spasm  Continue Myrbetriq or equivalent       30.0 - 39.9 Obese / Body mass index is 30.02 kg/m². Estimated discharge date: December 13  Barriers: Lumbar puncture     Code status: Full  Prophylaxis:  ASA /SCDs  Recommended Disposition: Home w/Family     Subjective:     Chief Complaint / Reason for Physician Visit  \"Pt reports improvement in her headache. Denies any new complaints. Reports feeling improved overall. Denies fever, chills, chest pain, shortness of breath abdominal pain, vomiting and diarrhea. No overnight events reported by nursing staff. Review of Systems:  Symptom Y/N Comments  Symptom Y/N Comments   Fever/Chills n   Chest Pain n    Poor Appetite    Edema     Cough    Abdominal Pain     Sputum    Joint Pain     SOB/HEMPHILL n   Pruritis/Rash     Nausea/vomit    Tolerating PT/OT     Diarrhea    Tolerating Diet y    Constipation    Other headache y      Could NOT obtain due to:      Objective:     VITALS:   Last 24hrs VS reviewed since prior progress note. Most recent are:  Patient Vitals for the past 24 hrs:   Temp Pulse Resp BP SpO2   12/14/22 0803 97.3 °F (36.3 °C) 84 18 130/71 96 %   12/14/22 0303 -- 82 18 (!) 158/67 98 %   12/13/22 2316 97.3 °F (36.3 °C) 82 18 129/61 97 %   12/13/22 1931 97.3 °F (36.3 °C) 76 16 139/61 96 %   12/13/22 1512 97.9 °F (36.6 °C) 68 16 131/67 95 %   12/13/22 1154 98.4 °F (36.9 °C) 67 16 (!) 155/63 95 %         Intake/Output Summary (Last 24 hours) at 12/14/2022 0843  Last data filed at 12/13/2022 1840  Gross per 24 hour   Intake 2300 ml   Output --   Net 2300 ml          I had a face to face encounter and independently examined this patient on 12/14/2022, as outlined below:  PHYSICAL EXAM:  General: WD, WN. Alert, cooperative, no acute distress    EENT:  diplopia Anicteric sclerae. MMM  Resp:  CTA bilaterally, no wheezing or rales. No accessory muscle use  CV:  Regular  rhythm,  No edema  GI:  Soft, Non distended, Non tender.   +Bowel sounds  Neurologic:  Alert and oriented X 3, normal speech,   Psych:   Good insight. Not anxious nor agitated  Skin:  No rashes. No jaundice    Reviewed most current lab test results and cultures  YES  Reviewed most current radiology test results   YES  Review and summation of old records today    NO  Reviewed patient's current orders and MAR    YES  PMH/SH reviewed - no change compared to H&P  ________________________________________________________________________  Care Plan discussed with:    Comments   Patient x    Family      RN x    Care Manager     Consultant                        Multidiciplinary team rounds were held today with , nursing, pharmacist and clinical coordinator. Patient's plan of care was discussed; medications were reviewed and discharge planning was addressed. ________________________________________________________________________  Total NON critical care TIME:  30   Minutes    Total CRITICAL CARE TIME Spent:   Minutes non procedure based      Comments   >50% of visit spent in counseling and coordination of care     ________________________________________________________________________  Felisa Scriver, DO     Procedures: see electronic medical records for all procedures/Xrays and details which were not copied into this note but were reviewed prior to creation of Plan. LABS:  I reviewed today's most current labs and imaging studies. Pertinent labs include:  Recent Labs     12/14/22 0136 12/13/22 0059 12/12/22 0307   WBC 7.2 7.4 9.5   HGB 12.4 12.5 12.9   HCT 37.9 38.0 40.6    290 301       Recent Labs     12/14/22  0136 12/13/22  0059 12/12/22  0307    139 140   K 3.8 3.6 3.7    104 101   CO2 26 30 30   * 128* 129*   BUN 20 19 20   CREA 1.23* 1.21* 1.21*   CA 9.2 9.0 9.2   ALB 3.5 3.5 3.5   TBILI 0.4 0.5 0.5   ALT 29 26 25         Signed:  Felisa Mckinney,

## 2022-12-15 VITALS
HEART RATE: 79 BPM | BODY MASS INDEX: 29.89 KG/M2 | WEIGHT: 186 LBS | HEIGHT: 66 IN | SYSTOLIC BLOOD PRESSURE: 159 MMHG | TEMPERATURE: 97.5 F | DIASTOLIC BLOOD PRESSURE: 70 MMHG | OXYGEN SATURATION: 97 % | RESPIRATION RATE: 18 BRPM

## 2022-12-15 LAB
ALB CSF/SERPL: 15 {RATIO} (ref 0–8)
ALBUMIN CSF-MCNC: 65 MG/DL (ref 8–37)
ALBUMIN SERPL-MCNC: 3.5 G/DL (ref 3.5–5)
ALBUMIN SERPL-MCNC: 4.3 G/DL (ref 3.8–4.9)
ALBUMIN/GLOB SERPL: 1 {RATIO} (ref 1.1–2.2)
ALP SERPL-CCNC: 118 U/L (ref 45–117)
ALT SERPL-CCNC: 29 U/L (ref 12–78)
ANION GAP SERPL CALC-SCNC: 7 MMOL/L (ref 5–15)
AST SERPL-CCNC: 30 U/L (ref 15–37)
BASOPHILS # BLD: 0 K/UL (ref 0–0.1)
BASOPHILS NFR BLD: 0 % (ref 0–1)
BILIRUB SERPL-MCNC: 0.6 MG/DL (ref 0.2–1)
BUN SERPL-MCNC: 27 MG/DL (ref 6–20)
BUN/CREAT SERPL: 25 (ref 12–20)
CALCIUM SERPL-MCNC: 9.3 MG/DL (ref 8.5–10.1)
CHLORIDE SERPL-SCNC: 107 MMOL/L (ref 97–108)
CO2 SERPL-SCNC: 27 MMOL/L (ref 21–32)
CREAT SERPL-MCNC: 1.09 MG/DL (ref 0.55–1.02)
DIFFERENTIAL METHOD BLD: ABNORMAL
EOSINOPHIL # BLD: 0 K/UL (ref 0–0.4)
EOSINOPHIL NFR BLD: 0 % (ref 0–7)
ERYTHROCYTE [DISTWIDTH] IN BLOOD BY AUTOMATED COUNT: 13.5 % (ref 11.5–14.5)
GLOBULIN SER CALC-MCNC: 3.6 G/DL (ref 2–4)
GLUCOSE BLD STRIP.AUTO-MCNC: 160 MG/DL (ref 65–117)
GLUCOSE BLD STRIP.AUTO-MCNC: 222 MG/DL (ref 65–117)
GLUCOSE SERPL-MCNC: 190 MG/DL (ref 65–100)
HCT VFR BLD AUTO: 35.7 % (ref 35–47)
HGB BLD-MCNC: 11.9 G/DL (ref 11.5–16)
IGG CSF-MCNC: 8.8 MG/DL (ref 0–6.7)
IGG SERPL-MCNC: 998 MG/DL (ref 586–1602)
IGG SYNTH RATE SER+CSF CALC-MRATE: 7.4 MG/DAY
IGG/ALB CLEAR SER+CSF-RTO: 0.6 (ref 0–0.7)
IGG/ALB CSF: 0.14 {RATIO} (ref 0–0.25)
IMM GRANULOCYTES # BLD AUTO: 0.2 K/UL (ref 0–0.04)
IMM GRANULOCYTES NFR BLD AUTO: 1 % (ref 0–0.5)
LYMPHOCYTES # BLD: 1.4 K/UL (ref 0.8–3.5)
LYMPHOCYTES NFR BLD: 6 % (ref 12–49)
MCH RBC QN AUTO: 30.2 PG (ref 26–34)
MCHC RBC AUTO-ENTMCNC: 33.3 G/DL (ref 30–36.5)
MCV RBC AUTO: 90.6 FL (ref 80–99)
MOG AB SER QL CBA IFA: NEGATIVE
MONOCYTES # BLD: 0.7 K/UL (ref 0–1)
MONOCYTES NFR BLD: 3 % (ref 5–13)
NEUTS SEG # BLD: 20.4 K/UL (ref 1.8–8)
NEUTS SEG NFR BLD: 90 % (ref 32–75)
NRBC # BLD: 0 K/UL (ref 0–0.01)
NRBC BLD-RTO: 0 PER 100 WBC
OLIGOCLONAL BANDS.IT SER+CSF QL: ABNORMAL
PLATELET # BLD AUTO: 294 K/UL (ref 150–400)
PMV BLD AUTO: 9.4 FL (ref 8.9–12.9)
POTASSIUM SERPL-SCNC: 4 MMOL/L (ref 3.5–5.1)
PROT SERPL-MCNC: 7.1 G/DL (ref 6.4–8.2)
RBC # BLD AUTO: 3.94 M/UL (ref 3.8–5.2)
RBC MORPH BLD: ABNORMAL
SERVICE CMNT-IMP: ABNORMAL
SERVICE CMNT-IMP: ABNORMAL
SODIUM SERPL-SCNC: 141 MMOL/L (ref 136–145)
WBC # BLD AUTO: 22.7 K/UL (ref 3.6–11)

## 2022-12-15 PROCEDURE — 74011000250 HC RX REV CODE- 250: Performed by: PHYSICIAN ASSISTANT

## 2022-12-15 PROCEDURE — 85025 COMPLETE CBC W/AUTO DIFF WBC: CPT

## 2022-12-15 PROCEDURE — 80053 COMPREHEN METABOLIC PANEL: CPT

## 2022-12-15 PROCEDURE — 74011250637 HC RX REV CODE- 250/637: Performed by: NURSE PRACTITIONER

## 2022-12-15 PROCEDURE — 74011250636 HC RX REV CODE- 250/636: Performed by: PSYCHIATRY & NEUROLOGY

## 2022-12-15 PROCEDURE — 74011000258 HC RX REV CODE- 258: Performed by: PSYCHIATRY & NEUROLOGY

## 2022-12-15 PROCEDURE — 74011636637 HC RX REV CODE- 636/637: Performed by: STUDENT IN AN ORGANIZED HEALTH CARE EDUCATION/TRAINING PROGRAM

## 2022-12-15 PROCEDURE — 74011250637 HC RX REV CODE- 250/637: Performed by: PHYSICIAN ASSISTANT

## 2022-12-15 PROCEDURE — 82962 GLUCOSE BLOOD TEST: CPT

## 2022-12-15 PROCEDURE — 36415 COLL VENOUS BLD VENIPUNCTURE: CPT

## 2022-12-15 RX ORDER — ATORVASTATIN CALCIUM 40 MG/1
40 TABLET, FILM COATED ORAL
Qty: 30 TABLET | Refills: 0 | Status: SHIPPED | OUTPATIENT
Start: 2022-12-15

## 2022-12-15 RX ORDER — BUTALBITAL, ACETAMINOPHEN AND CAFFEINE 50; 325; 40 MG/1; MG/1; MG/1
1 TABLET ORAL
Qty: 10 TABLET | Refills: 0 | Status: SHIPPED | OUTPATIENT
Start: 2022-12-15

## 2022-12-15 RX ORDER — GUAIFENESIN 600 MG/1
600 TABLET, EXTENDED RELEASE ORAL EVERY 12 HOURS
Qty: 10 TABLET | Refills: 0 | Status: SHIPPED | OUTPATIENT
Start: 2022-12-15

## 2022-12-15 RX ADMIN — SODIUM CHLORIDE 1000 MG: 900 INJECTION INTRAVENOUS at 10:20

## 2022-12-15 RX ADMIN — ASPIRIN 325 MG ORAL TABLET 325 MG: 325 PILL ORAL at 09:58

## 2022-12-15 RX ADMIN — CETIRIZINE HYDROCHLORIDE 10 MG: 10 TABLET, FILM COATED ORAL at 09:58

## 2022-12-15 RX ADMIN — Medication 2 UNITS: at 12:17

## 2022-12-15 RX ADMIN — HYDROXYZINE HYDROCHLORIDE 25 MG: 25 TABLET, FILM COATED ORAL at 09:59

## 2022-12-15 RX ADMIN — SODIUM CHLORIDE, PRESERVATIVE FREE 10 ML: 5 INJECTION INTRAVENOUS at 14:00

## 2022-12-15 RX ADMIN — CARVEDILOL 25 MG: 12.5 TABLET, FILM COATED ORAL at 09:58

## 2022-12-15 RX ADMIN — Medication 1 UNITS: at 10:04

## 2022-12-15 RX ADMIN — FUROSEMIDE 40 MG: 40 TABLET ORAL at 09:59

## 2022-12-15 RX ADMIN — BUPROPION HYDROCHLORIDE 150 MG: 150 TABLET, EXTENDED RELEASE ORAL at 10:02

## 2022-12-15 RX ADMIN — ATORVASTATIN CALCIUM 40 MG: 40 TABLET, FILM COATED ORAL at 09:58

## 2022-12-15 RX ADMIN — TROSPIUM CHLORIDE 20 MG: 20 TABLET, FILM COATED ORAL at 10:04

## 2022-12-15 RX ADMIN — SODIUM CHLORIDE, PRESERVATIVE FREE 10 ML: 5 INJECTION INTRAVENOUS at 06:15

## 2022-12-15 RX ADMIN — DULOXETINE HYDROCHLORIDE 60 MG: 30 CAPSULE, DELAYED RELEASE ORAL at 09:58

## 2022-12-15 RX ADMIN — GUAIFENESIN 600 MG: 600 TABLET, EXTENDED RELEASE ORAL at 09:58

## 2022-12-15 NOTE — PROGRESS NOTES
Bedside shift report given to oncoming nurse Dick Wallace by off going nurse Sharyn Crain RN patient alert and wake patient stable during shift change no distress noted

## 2022-12-15 NOTE — PROGRESS NOTES
1336 - Awaiting patient ride. CM to set up follow up appointment prior to printing discharge instructions. 56 - Clarified with MD. Patient to take 40mg by mouth daily of furosemide. Patient's D/c instructions fixed. Reviewed discharge instructions, medications, and follow up with patient. Awaiting patient ride. 1516 - PIV removed per protocol. Patient taken to car via wheelchair.

## 2022-12-15 NOTE — DISCHARGE INSTRUCTIONS
It is critical that you follow up with your physicians on an outpatient basis. PLEASE TAKE YOUR MEDICATIONS AND FOLLOW UP FOR ANY LABS AS INDICATED ON THIS DISCHARGE NOTE. Please return to the local emergency room if your symptoms worsen. Please contact your PCP if you have any questions or concerns. Thank you, it was a pleasure taking care of you.

## 2022-12-15 NOTE — DISCHARGE SUMMARY
Hospitalist Discharge Summary     Patient ID:  Lluvia Kessler  [id number]  78 y.o.  1960  12/10/2022    PCP on record: Ruy Decker MD    Admit date: 12/10/2022  Discharge date and time: 12/15/2022    DISCHARGE DIAGNOSIS:    -Diplopia  -Viral upper respiratory infection    CONSULTATIONS:  IP CONSULT TO NEUROLOGY    Excerpted HPI from H&P of Dov Pepe DO:    Lluvia Kessler is a 63 y.o. 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 Patient became concerned after she could not track her left eye outwards and sought medical evaluation. 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 CT of the head revealed no acute intracranial process. Patient complains of a viral  infection with head congestion for approximately 3 weeks which has been going around her house. Unfortunately the children in the home were diagnosed with flu although she remains negative. She also complains of sinus pressure and bilateral painful congestion of the ears. ______________________________________________________________________  DISCHARGE SUMMARY/HOSPITAL COURSE:  for full details see H&P, daily progress notes, labs, consult notes.       -Respiratory viral infection  Has been present for approximately 2 weeks with sinus congestion  CT of the head without obvious sinus congestion  Negative for COVID or influenza  Decongestant ordered   CXR no acute procss     -Diplopia, suspect secondary to inflammatory etiology, no CVA  MRI showed No acute infarction.  Small areas of signal abnormality in the medial left thalamus and caudate/corona radiata inferiorly may represent subacute/chronic infarct particularly given chronic   Appreciate Neuro input   -LP 12/12 with high protein, neurology recommended high dose steroid therapy for possible inflammatory etiology, will monitor blood sugar closely and start the patient on sliding scale Humalog  -negative HSV, pending CSF Lyme, MS panel  -MRI brain with contrast showed nonspecific sequelae of prior ischemia/infarct. 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 Possibly representing moyamoya disease versus chronic atherosclerotic occlusion  CT of the head without acute intracranial process  Lipid panel  Trigly - 348 LDL 56 pt on Atorvastin   Hgb A1c 5.7       Essential hypertension  - resume Coreg , pt states not taking Lisinopril or Spironolactone   - Hydralazine for systolic blood pressure greater 170  - Blood pressures improved systolic blood pressure 487S overnight     Depression  Continue Wellbutrin      History of Lyme disease/ bartonella /babesia  Stable and treated    -Bladder spasm  Continue Myrbetriq or equivalent         30.0 - 39.9 Obese / Body mass index is 30.02 kg/m². Code status: Full  Prophylaxis:  ASA /SCDs  Recommended Disposition: Home w/Family      _______________________________________________________________________  Patient seen and examined by me on discharge day. Pertinent Findings:  Gen:    Not in distress  Chest: Clear lungs  CVS:   Regular rhythm. No edema  Abd:  Soft, not distended, not tender  Neuro:  Alert,   _______________________________________________________________________  DISCHARGE MEDICATIONS:   Current Discharge Medication List        START taking these medications    Details   butalbital-acetaminophen-caffeine (FIORICET, ESGIC) -40 mg per tablet Take 1 Tablet by mouth every four (4) hours as needed for Headache.  Indications: tension headache  Qty: 10 Tablet, Refills: 0  Start date: 12/15/2022      atorvastatin (LIPITOR) 40 mg tablet Take 1 Tablet by mouth nightly. Qty: 30 Tablet, Refills: 0  Start date: 12/15/2022      guaiFENesin ER (MUCINEX) 600 mg ER tablet Take 1 Tablet by mouth every twelve (12) hours. Qty: 10 Tablet, Refills: 0  Start date: 12/15/2022           CONTINUE these medications which have NOT CHANGED    Details   Myrbetriq 25 mg ER tablet Take 25 mg by mouth daily. buPROPion SR (WELLBUTRIN SR) 150 mg SR tablet TAKE 1 TABLET BY MOUTH TWICE DAILY DO NOT CRUSH, CHEW OR SPLIT      DULoxetine (CYMBALTA) 60 mg capsule Take 60 mg by mouth two (2) times a day. cetirizine (ZYRTEC) 10 mg tablet Take 1 Tab by mouth daily. Qty: 20 Tab, Refills: 0      furosemide (LASIX) 40 mg tablet Take 40 mg by mouth daily. 1.5 tab in am and 1 tab in afternoon   Indications: CHF      carvedilol (COREG) 25 mg tablet Take 25 mg by mouth daily. lisinopril (PRINIVIL, ZESTRIL) 5 mg tablet Take 5 mg by mouth daily. aspirin (SHAMAR CHEWABLE ASPIRIN) 81 mg chewable tablet Take 81 mg by mouth nightly. ondansetron hcl (ZOFRAN, AS HYDROCHLORIDE,) 4 mg tablet Take 4 mg by mouth every twelve (12) hours as needed for Nausea (1-2 tabs as needed for nausea). ferrous sulfate 324 mg (65 mg iron) tablet Take 130 mg by mouth Daily (before breakfast). Indications: IRON DEFICIENCY ANEMIA      OMEGA-3/DHA/EPA/FISH OIL (FISH OIL HIGH POTENCY PO) Take 1 Cap by mouth two (2) times a day. calcium-cholecalciferol, d3, (CALCIUM 600 + D) 600-125 mg-unit tab Take 1 Cap by mouth. cholecalciferol, vitamin D3, (VITAMIN D3) 2,000 unit tab Take 1 Tab by mouth daily. ASHWAGANDHA ROOT EXTRACT,BULK, 2 Caps by Does Not Apply route two (2) times a day. B.ANI/L. ACI/L.SAL/L. PLAN/L.CARRIE (PROBIOTIC FORMULA PO) Take 2 Caps by mouth two (2) times a day. Patient Follow Up Instructions:    Activity: Activity as tolerated  Diet: Resume previous diet      Follow-up Information       Follow up With Specialties Details Why Contact Cindy Singh 68, DO Neurology Follow up in 1 week(s)  [address] [facility]  [phone]            ________________________________________________________________    Risk of deterioration: Low    Condition at Discharge:  Stable  __________________________________________________________________    Disposition  Home with family, no needs    ____________________________________________________________________    Code Status: Full Code  ___________________________________________________________________      Total time in minutes spent coordinating this discharge (includes going over instructions, follow-up, prescriptions, and preparing report for sign off to her PCP) :  >30 minutes    Signed:   Wen Frost DO

## 2022-12-15 NOTE — PROGRESS NOTES
End of Shift Note    Bedside shift change report given to Jw Rodriguez RN (oncoming nurse) by Brenna Peters RN (offgoing nurse). Report included the following information SBAR and Kardex    Shift worked: Night       Shift summary and any significant changes:    Uneventful night, pt. Reported slet well after receiving atarax. Call bell and phone within reach of patient. Able to make needs known. Concerns for physician to address:  na     Zone phone for oncoming shift:  [phone]       Activity:  Activity Level: Up ad dianne  Number times ambulated in hallways past shift: 0  Number of times OOB to chair past shift: 0    Cardiac:   Cardiac Monitoring: Yes      Cardiac Rhythm: Sinus Rhythm    Access:  Current line(s): PIV     Genitourinary:   Urinary status: voiding    Respiratory:   O2 Device: None (Room air)  Chronic home O2 use?: NO  Incentive spirometer at bedside: NO       GI:  Last Bowel Movement Date: 12/10/22  Current diet:  ADULT DIET Regular  Passing flatus: YES  Tolerating current diet: YES       Pain Management:   Patient states pain is manageable on current regimen: YES    Skin:  Danilo Score: 20  Interventions: increase time out of bed and PT/OT consult    Patient Safety:  Fall Score:  Total Score: 1  Interventions: stay with me (per policy)  High Fall Risk: Yes    Length of Stay:  Expected LOS: 2d 0h  Actual LOS: 5      Brenna Peters RN

## 2022-12-15 NOTE — PROGRESS NOTES
Problem: TIA/CVA Stroke: 0-24 hours  Goal: Off Pathway (Use only if patient is Off Pathway)  Outcome: Progressing Towards Goal  Goal: Activity/Safety  Outcome: Progressing Towards Goal  Goal: Consults, if ordered  Outcome: Progressing Towards Goal  Goal: Diagnostic Test/Procedures  Outcome: Progressing Towards Goal  Goal: Nutrition/Diet  Outcome: Progressing Towards Goal  Goal: Discharge Planning  Outcome: Progressing Towards Goal  Goal: Medications  Outcome: Progressing Towards Goal  Goal: Respiratory  Outcome: Progressing Towards Goal  Goal: Treatments/Interventions/Procedures  Outcome: Progressing Towards Goal  Goal: Minimize risk of bleeding post-thrombolytic infusion  Outcome: Progressing Towards Goal  Goal: Monitor for complications post-thrombolytic infusion  Outcome: Progressing Towards Goal  Goal: Psychosocial  Outcome: Progressing Towards Goal  Goal: *Hemodynamically stable  Outcome: Progressing Towards Goal  Goal: *Neurologically stable  Description: Absence of additional neurological deficits    Outcome: Progressing Towards Goal  Goal: *Verbalizes anxiety and depression are reduced or absent  Outcome: Progressing Towards Goal  Goal: *Absence of Signs of Aspiration on Current Diet  Outcome: Progressing Towards Goal  Goal: *Absence of deep venous thrombosis signs and symptoms(Stroke Metric)  Outcome: Progressing Towards Goal  Goal: *Ability to perform ADLs and demonstrates progressive mobility and function  Outcome: Progressing Towards Goal  Goal: *Stroke education started(Stroke Metric)  Outcome: Progressing Towards Goal  Goal: *Dysphagia screen performed(Stroke Metric)  Outcome: Progressing Towards Goal  Goal: *Rehab consulted(Stroke Metric)  Outcome: Progressing Towards Goal     Problem: Patient Education: Go to Patient Education Activity  Goal: Patient/Family Education  Outcome: Progressing Towards Goal     Problem: Patient Education: Go to Patient Education Activity  Goal: Patient/Family Education  Outcome: Progressing Towards Goal     Problem: Falls - Risk of  Goal: *Absence of Falls  Description: Document [place] Fall Risk and appropriate interventions in the flowsheet. Outcome: Progressing Towards Goal  Note: Fall Risk Interventions:  Mobility Interventions: Patient to call before getting OOB         Medication Interventions: Assess postural VS orthostatic hypotension    Elimination Interventions: Call light in reach              Problem: Patient Education: Go to Patient Education Activity  Goal: Patient/Family Education  Outcome: Progressing Towards Goal     Problem: Pressure Injury - Risk of  Goal: *Prevention of pressure injury  Description: Document Danilo Scale and appropriate interventions in the flowsheet.   Outcome: Progressing Towards Goal

## 2022-12-15 NOTE — PROGRESS NOTES
Problem: Patient Education: Go to Patient Education Activity  Goal: Patient/Family Education  Outcome: Progressing Towards Goal     Problem: Falls - Risk of  Goal: *Absence of Falls  Description: Document Dennie Yael Fall Risk and appropriate interventions in the flowsheet.   Outcome: Progressing Towards Goal  Note: Fall Risk Interventions:  Mobility Interventions: Assess mobility with egress test         Medication Interventions: Evaluate medications/consider consulting pharmacy    Elimination Interventions: Call light in reach              Problem: TIA/CVA Stroke: 0-24 hours  Goal: *Neurologically stable  Description: Absence of additional neurological deficits    Outcome: Progressing Towards Goal

## 2022-12-15 NOTE — PROGRESS NOTES
Neurology Note    Patient ID:  Edith Hammer  [id number]  34 y.o.  1960      Date of Consultation:  December 15, 2022      Assessment and Plan:    The patient is a 63-year-old female with a subacute onset of diplopia after developing a recent viral illness. 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 No improvement in her diplopia    Diplopia:    Differential includes ischemic 6th nerve, infectious, inflammatory, autoimmune  Brain MRI with no obvious etiology. MRI negative for an acute stroke. Follow-up brain MRI with contrast reveals no significant inflammatory or infectious etiology. Lumbar puncture performed. Cell count normal.  Protein elevated. csf lyme pending,  ms panel pending  anti mog pending  CTA with no aneursymal compression    Serum lyme was negative, mg panel prelim negative, tsh normal, ace normal, anti-ds dna normal, ssa/ssb negative, veto panel negative, hsv negative, nmo negative    She will continue to wear the eye patch for the time being. Given the results of high csf protein and no cells,she received 3 days of high dose steroids for a possible inflammatory/autoimmune etiology. I discussed the rationale with the patient today. Htn: maintain sbp < 140  Continue aspirin 81 mg. Hemoglobin a1c was at goal - 5.7  Ldl: 56, triglycerides  348    Sinus inflammation noted on imaging:     on decongestant  Depression  Bladder spasm  Headache: treatment through primary team    There is no other additional neurology recommendations at this time. If questions arise, please do not hesitate to contact me and I will return to see the patient. The patient should follow-up in clinic in approximately 2-4 weeks time after discharge.              Subjective: my vision is still double, but I am feeling better     History of Present Illness:   Edith Hammer is a 63 y.o. female with a history of hypertension and dyslipidemia who presented to the emergency department due to double vision. Extensive work-up today without a clear etiology. She did receive her first dose of IV steroids without any difficulty yesterday. She still does have the double vision. She does feel slightly better when walking. Today she could be on her phone without needing to wear her eye patch.     Neck and shoulder pain is less today    Past Medical History:   Diagnosis Date    Asthma     Heart failure ([address]     Hypercholesteremia     Hypertension     Ill-defined condition     Psoriasis    The patient reports of having had a history of Lyme disease as well as other tick borne disease in the past that was quite severe is causing her to be wheelchair-bound for an extended period of time    Past Surgical History:   Procedure Laterality Date    HX OTHER SURGICAL Bilateral     JAW plates    HX OTHER SURGICAL Right     Ankle with screws and plates        Family History   Problem Relation Age of Onset    Heart Disease Father     Diabetes Father     Hypertension Father     Kidney Disease Father     High Cholesterol Father         Social History     Tobacco Use    Smoking status: Former     Types: Cigarettes     Quit date: 5/15/2007     Years since quitting: 15.5    Smokeless tobacco: Never   Substance Use Topics    Alcohol use: No        Allergies   Allergen Reactions    Gold Au 198 Rash          Current Facility-Administered Medications   Medication Dose Route Frequency    methylPREDNISolone (Solu-MEDROL) 1 g in 100 mL NS MBP  1,000 mg IntraVENous DAILY    insulin lispro (HUMALOG) injection   SubCUTAneous AC&HS    HYDROmorphone (DILAUDID) tablet 1 mg  1 mg Oral Q4H PRN    aspirin tablet 325 mg  325 mg Oral DAILY    atorvastatin (LIPITOR) tablet 40 mg  40 mg Oral DAILY    sodium chloride (NS) flush 5-40 mL  5-40 mL IntraVENous Q8H    sodium chloride (NS) flush 5-40 mL  5-40 mL IntraVENous PRN    acetaminophen (TYLENOL) tablet 650 mg  650 mg Oral Q6H PRN    Or    acetaminophen (TYLENOL) suppository 650 mg  650 mg Rectal Q6H PRN    polyethylene glycol (MIRALAX) packet 17 g  17 g Oral DAILY PRN    ondansetron (ZOFRAN ODT) tablet 4 mg  4 mg Oral Q8H PRN    Or    ondansetron (ZOFRAN) injection 4 mg  4 mg IntraVENous Q6H PRN    buPROPion ER (ZYBAN,BUPROBAN) tablet 150 mg  150 mg Oral DAILY    cetirizine (ZYRTEC) tablet 10 mg  10 mg Oral DAILY    DULoxetine (CYMBALTA) capsule 60 mg  60 mg Oral BID    furosemide (LASIX) tablet 40 mg  40 mg Oral DAILY    trospium (SANCTURA) tablet 20 mg  20 mg Oral BID    hydrOXYzine HCL (ATARAX) tablet 25 mg  25 mg Oral TID PRN    hydrALAZINE (APRESOLINE) 20 mg/mL injection 20 mg  20 mg IntraVENous Q6H PRN    melatonin tablet 3 mg  3 mg Oral QHS PRN    butalbital-acetaminophen-caffeine (FIORICET, ESGIC) -40 mg per tablet 1 Tablet  1 Tablet Oral Q4H PRN    guaiFENesin ER (MUCINEX) tablet 600 mg  600 mg Oral Q12H    carvediloL (COREG) tablet 25 mg  25 mg Oral DAILY       Review of Systems:    General, constitutional:head congestion  Eyes, vision: negative  Ears, nose, throat: negative  Cardiovascular, heart: negative  Respiratory: negative  Gastrointestinal: negative  Genitourinary: negative  Musculoskeletal: negative  Skin and integumentary: negative  Psychiatric: negative  Endocrine: negative  Neurological: negative, except for HPI  Hematologic/lymphatic: negative  Allergy/immunology: negative    Objective:     Visit Vitals  /72   Pulse 78   Temp 97.5 °F (36.4 °C)   Resp 18   Ht 5' 6\" (1.676 m)   Wt 186 lb (84.4 kg)   SpO2 97%   BMI 30.02 kg/m²       Physical Exam:    General:  appears well nourished in no acute distress  Neck: no carotid bruits  Lungs: clear to auscultation  Heart:  no murmurs, regular rate  Lower extremity: peripheral pulses palpable and no edema  Skin: intact    Neurological exam:    Awake, alert, oriented to person, place and time  Recent and remote memory were normal  Attention and concentration were intact  Language was intact.   There was no aphasia  Speech: no dysarthria  Fund of knowledge was preserved    Cranial nerves:   II-XII were tested    Perrrla  Visual fields were full  She does have a disconjugate primary gaze with intorsion of the left. She cannot fully abduct her left eye.- although improved. There was no ptosis. There was no fatigable ptosis  Facial sensation:  normal and symmetric  Facial motor: normal and symmetric  Hearing intact  SCM strength intact  Tongue: midline without fasciculations    Motor: Tone normal  Pronator drift was absent    No evidence of fasciculations    Strength testing:   deltoid triceps biceps Wrist ext. Wrist flex. intrinsics Hip flex. Hip ext. Knee ext. Knee flex Dorsi flex Plantar flex   Right 5 5 5 5 5 5 5 5 5 5 5 5   Left 5 5 5 5 5 5 5 5 5 5 5 5       Sensory:  Upper extremity: intact to pp,  Lower extremity: intact to pp,    Reflexes:    Right Left  Biceps  2 2  Triceps     2 2  Brachiorad. 2 2  Patella  1 1  Achilles 1 1  Cerebellar testing:  no tremor apparent, finger/nose and rachell were intact    Gait:not assessed due to concerns over safety. Labs:     Lab Results   Component Value Date/Time    Hemoglobin A1c 5.7 (H) 12/11/2022 04:00 AM    Sodium 141 12/15/2022 05:12 AM    Potassium 4.0 12/15/2022 05:12 AM    Chloride 107 12/15/2022 05:12 AM    Glucose 190 (H) 12/15/2022 05:12 AM    BUN 27 (H) 12/15/2022 05:12 AM    Creatinine 1.09 (H) 12/15/2022 05:12 AM    Calcium 9.3 12/15/2022 05:12 AM    WBC 22.7 (H) 12/15/2022 05:12 AM    HCT 35.7 12/15/2022 05:12 AM    HGB 11.9 12/15/2022 05:12 AM    PLATELET 709 58/18/8872 05:12 AM       Imaging:    Results from Hospital Encounter encounter on 12/10/22    MRI BRAIN W WO CONT    Narrative  EXAM:  MRI BRAIN W WO CONT    INDICATION: 82-year-old female with abnormal findings on non contrast brain MRI. Patient now returns for postcontrast study. COMPARISON:  12/10/2022 brain MRI. CONTRAST: 15 ml ProHance.     TECHNIQUE:  Multiplanar multisequence acquisition without and with contrast of the brain. FINDINGS:  Corresponding to previously demonstrated areas of FLAIR/T2 hyperintense signal  abnormality in the inferomedial left lateral ventricular wall, there is no  associated significant contrast enhancement or obvious mass lesion. The findings  are much less conspicuous compared to prior study. The ventricles sizes and  configuration are within normal limits. There is no acute infarct, hemorrhage,  extra-axial fluid collection, or mass effect. There is no cerebellar tonsillar  herniation. Expected arterial flow-voids are present. Basal CSF cisterns are  patent. Mild diffuse mucosal thickening of the paranasal sinuses. There is moderate to  large left and moderate right mastoid effusions, similar to prior study. The  orbital contents are within normal limits. No significant osseous or scalp  lesions are identified. Impression  Previously demonstrated T2/FLAIR signal abnormality about juxtapleural ventricle  left caudate head/thalamus is much less conspicuous on current study demonstrate  no associated contrast enhancement. These are nonspecific and may represent  sequelae of prior ischemia/infarct. Attention on follow-up is advised. Bilateral mastoid effusions. Results from [place] encounter on 12/09/22    CTA HEAD NECK W CONT    Narrative  EXAM:  CTA HEAD NECK W CONT    INDICATION:   cva; left eye cn 6 palsy    COMPARISON:  None. CONTRAST:  100 mL of Isovue-370. TECHNIQUE:  Unenhanced  images were obtained to localize the volume for  acquisition. Multislice helical axial CT angiography was performed from the  aortic arch to the top of the head during uneventful rapid bolus intravenous  contrast administration. Coronal and sagittal reformations and 3D/MIP  post  processing were performed. CT dose reduction was achieved through use of a standardized protocol tailored  for this examination and automatic exposure control for dose modulation. This  study was analyzed by the [address] ai algorithm. FINDINGS:    CTA Head:  Proximal occlusion of the right MCA, likely chronic with extensive small vessel  collaterals formation. There is no evidence of large vessel occlusion or flow-limiting stenosis of the  intracranial internal carotid, anterior cerebral, and right middle cerebral  arteries. Calcification of the bilateral carotid siphons without significant  stenosis. The anterior communicating artery is patent. There is no evidence of large vessel occlusion or flow-limiting stenosis of the  intracranial vertebral arteries, basilar artery, or posterior cerebral arteries. There is no definite posterior communicating arteries identified. There is no evidence of aneurysm or vascular malformation. The dural venous  sinuses and deep cerebral venous system are patent. No evidence of abnormal  enhancement on delayed phase images. Brain parenchyma demonstrates no suggestion of acute infarct. CTA NECK:  NASCET method was utilized for calculating stenosis. The aortic arch is unremarkable. The common carotid arteries demonstrate no  significant stenosis. There is no evidence of significant stenosis in the  cervical right internal carotid artery. There is no evidence of significant  stenosis in the cervical left internal carotid artery. Carotid bulbs plaques  without significant luminal narrowing.  % of right carotid artery stenosis: 0  % of left carotid artery stenosis: 0    There is a codominant vertebrobasilar arterial system. The cervical vertebral  arteries are normal in course, size and contour without significant stenosis. Dental amalgam associated metallic artifact precludes optimal evaluation of the  adjacent structures, oral cavity and oropharynx. Maxillofacial reconstruction  hardware along bilateral anterior maxillary wall. The orbits are bilaterally  unremarkable and without obvious mass lesion.  Visualized soft tissues of the  skull base and neck are unremarkable. Visualized paranasal sinuses and mastoids  are patent. Visualized lung apices are clear. No acute fracture or aggressive osseous lesion. Degenerative changes of the  cervical spine. Impression  CTA  1. CTA neck demonstrates no large vessel occlusion, high-grade stenosis or  aneurysm. 2. 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 Findings may represent moyamoya  disease versus chronic atherosclerotic occlusion. Correlate with brain MRI to  exclude acute infarct. 3. No intracranial aneurysm or obvious intraorbital mass lesion. head CT from December 9, 2022. There is no acute abnormality. CTA of the head and neck revealed no large vessel flow-limiting stenosis. There was possible occlusion of the left MCA with small vessel collateralization. Brain MRI was recommended    brain MRI from December 10, 2022. There is no clear evidence of an acute stroke. There is subtle signal abnormality in the left thalamus which may resent a subacute to chronic stroke. Contrasted image on 12/11/2022 with no abnormal enhancement. I spent  25  minutes providing care to this  acutely ill inpatient with > 50% of the time counseling and assisting in the coordination of care of the patient on the patient's hospital floor/unit.          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

## 2022-12-16 ENCOUNTER — DOCUMENTATION ONLY (OUTPATIENT)
Dept: NEUROLOGY | Age: 62
End: 2022-12-16

## 2022-12-16 NOTE — PROGRESS NOTES
Called patient to schedule hospital follow up appointment per Dr. Pak Angry left voicemail to return a call to the office

## 2022-12-20 ENCOUNTER — OFFICE VISIT (OUTPATIENT)
Dept: NEUROLOGY | Age: 62
End: 2022-12-20
Payer: MEDICAID

## 2022-12-20 ENCOUNTER — DOCUMENTATION ONLY (OUTPATIENT)
Dept: NEUROLOGY | Age: 62
End: 2022-12-20

## 2022-12-20 VITALS
HEART RATE: 67 BPM | TEMPERATURE: 97.4 F | HEIGHT: 66 IN | RESPIRATION RATE: 16 BRPM | SYSTOLIC BLOOD PRESSURE: 84 MMHG | BODY MASS INDEX: 29.12 KG/M2 | DIASTOLIC BLOOD PRESSURE: 46 MMHG | OXYGEN SATURATION: 98 % | WEIGHT: 181.2 LBS

## 2022-12-20 DIAGNOSIS — H49.21 RIGHT ABDUCENS NERVE PALSY: Primary | ICD-10-CM

## 2022-12-20 DIAGNOSIS — I50.9 CHRONIC CONGESTIVE HEART FAILURE, UNSPECIFIED HEART FAILURE TYPE (HCC): ICD-10-CM

## 2022-12-20 DIAGNOSIS — M79.10 MYALGIA: ICD-10-CM

## 2022-12-20 DIAGNOSIS — J34.9 SINUS DISORDER: ICD-10-CM

## 2022-12-20 PROBLEM — E88.09 DEFICIENCY OF ALPHA-GALACTOSIDASE: Status: ACTIVE | Noted: 2022-01-20

## 2022-12-20 PROBLEM — N39.41 URGE INCONTINENCE: Status: ACTIVE | Noted: 2020-05-12

## 2022-12-20 PROCEDURE — 3078F DIAST BP <80 MM HG: CPT | Performed by: PSYCHIATRY & NEUROLOGY

## 2022-12-20 PROCEDURE — 3074F SYST BP LT 130 MM HG: CPT | Performed by: PSYCHIATRY & NEUROLOGY

## 2022-12-20 PROCEDURE — 99215 OFFICE O/P EST HI 40 MIN: CPT | Performed by: PSYCHIATRY & NEUROLOGY

## 2022-12-20 RX ORDER — SOLIFENACIN SUCCINATE 5 MG/1
TABLET, FILM COATED ORAL
COMMUNITY

## 2022-12-20 RX ORDER — CLOBETASOL PROPIONATE 0.46 MG/ML
SOLUTION TOPICAL 2 TIMES DAILY
COMMUNITY
Start: 2022-05-20

## 2022-12-20 RX ORDER — IBUPROFEN 600 MG/1
600 TABLET ORAL
Qty: 120 TABLET | Refills: 2 | Status: SHIPPED | OUTPATIENT
Start: 2022-12-20

## 2022-12-20 RX ORDER — SIMVASTATIN 20 MG/1
TABLET, FILM COATED ORAL
COMMUNITY
End: 2022-12-20

## 2022-12-20 RX ORDER — BUDESONIDE AND FORMOTEROL FUMARATE DIHYDRATE 160; 4.5 UG/1; UG/1
2 AEROSOL RESPIRATORY (INHALATION)
COMMUNITY

## 2022-12-20 RX ORDER — FESOTERODINE FUMARATE 4 MG/1
TABLET, EXTENDED RELEASE ORAL
COMMUNITY
Start: 2022-11-28

## 2022-12-20 NOTE — PROGRESS NOTES
[facility] 85  In Office FOLLOW-UP VISIT         Alvin Vigil is a 63 y.o. female who presents today for the following:  Chief Complaint   Patient presents with    Hospital Follow Up     Batool Rajan into hospital and was just released on Thursday for the double vision and eye was not aligning with the other eye. Lots of pressure in the head. Saw Dr Bruno Montoya  Patient is known to this practice. This is my first time seeing the patient. Chart and history reviewed in detail at today's office visit. 1. Right abducens nerve palsy  Assessment & Plan:   Predominantly right-sided  [However initially stated to be on the left which was not as apparent on today's visit]  Continue to wear eye patch  Refer to neuro-ophthalmology for further evaluation  We will look to repeat MRI scan of the brain in 3 to 6 months depending on patient's symptoms  Orders:  -     REFERRAL TO NEURO-OPHTHALMOLOGY  2. Myalgia  Assessment & Plan:   Intensified since leaving the hospital  Prescribing 600 mg ibuprofen 1 4 times daily as needed but I have recommended taking at least 1 tablet a day on a routine basis    We will check inflammatory indicators and CK level  Orders:  -     ibuprofen (MOTRIN) 600 mg tablet; Take 1 Tablet by mouth every six (6) hours as needed for Pain. Indications: pain, Normal, Disp-120 Tablet, R-2  -     CK; Future  -     SED RATE (ESR); Future  -     RA + CCP ABS  3. Chronic congestive heart failure, unspecified heart failure type [facility])  Assessment & Plan:  Pt reports EF now 50-55%  4. Sinus disorder  Assessment & Plan:   Refer to ENT  Orders:  -     REFERRAL TO ENT-OTOLARYNGOLOGY    Patient and/or family was given time to ask questions and voice concerns. I believe all questions concerns were adequately addressed at this  office visit.   Patient and/or family also verbalized agreement and understanding of the above-stated plan    Complex neurologic decision making secondary any or all of the following to include unclear etiology, and /or polypharmacy, and/or significant comorbid conditions, and/or use of high-risk medications which complicate the decision making process related to patient's neurologic diagnosis          ICD-10-CM ICD-9-CM    1. Right abducens nerve palsy  H49.21 378.54 REFERRAL TO NEURO-OPHTHALMOLOGY      2. Myalgia  M79.10 729.1 ibuprofen (MOTRIN) 600 mg tablet      CK      SED RATE (ESR)      RA + CCP ABS      3. Chronic congestive heart failure, unspecified heart failure type (HCC)  I50.9 428.0       4. Sinus disorder  J34.9 473.9 REFERRAL TO ENT-OTOLARYNGOLOGY            I attest that 40 minutes was spent on today's visit reviewing medical records and diagnostic testing deemed pertinent to this patient's care, along with direct time spent at patient's visit including the history, physical assessment and plan, discussing diagnosis and management along with documentation. HPI  Historical Data  Patient is known to the practice and was previously seen by Dr. Courtney Jung while in the hospital 12/10/2022 through 12/15/2022   Discharge diagnosis as excerpted from hospital discharge note:  Respiratory viral infection  Has been present for approximately 2 weeks with sinus congestion  CT of the head without obvious sinus congestion  Negative for COVID or influenza  Decongestant ordered   CXR no acute procss     -Diplopia, suspect secondary to inflammatory etiology, no CVA  MRI showed No acute infarction.  Small areas of signal abnormality in the medial left thalamus and caudate/corona radiata inferiorly may represent subacute/chronic infarct particularly given chronic   Appreciate Neuro input   -LP 12/12 with high protein, neurology recommended high dose steroid therapy for possible inflammatory etiology, will monitor blood sugar closely and start the patient on sliding scale Humalog  -negative HSV, pending CSF Lyme, MS panel  -MRI brain with contrast showed nonspecific sequelae of prior ischemia/infarct. 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   Possibly representing moyamoya disease versus chronic atherosclerotic occlusion  CT of the head without acute intracranial process  Lipid panel  Trigly - 348 LDL 56 pt on Atorvastin   Hgb A1c 5.7       Essential hypertension  - resume Coreg , pt states not taking Lisinopril or Spironolactone   - Hydralazine for systolic blood pressure greater 170  - Blood pressures improved systolic blood pressure 839H overnight     Depression  Continue Wellbutrin      History of Lyme disease/ bartonella /babesia  Stable and treated    -Bladder spasm  Continue Myrbetriq or equivalent       Neurologic diagnosis  Diplopia   Onset: Subacute, December 2022   Exam: Incomplete 6th nerve palsy   Felt to be most related to inflammatory process from recent upper respiratory head cold infection   Received 3 days of high-dose steroids secondary to possible inflammatory/autoimmune etiology     Serum lyme was negative, mg panel prelim negative, tsh normal, ace normal, anti-ds dna normal, ssa/ssb negative, veto panel negative, hsv negative, nmo negative     CSF studies: 0 oligoclonal banding    IgG quantitative: 8.8    CSF albumin: 65    IgG synthesis rate: 7.4      All other labs were negative related to CSF       Other significant comorbid conditions/concerns  Hx of Lyme disease: oral abx       Interim Data:     Diplopia  No better  Continues to wear an eye patch  Complains of pain with R eye deviation      Pain  Head shoulders back  She had this when in the hospital as well she is to continue to use OTC ibuprofen and feels she gets some relief and is wondering about prescription strength          Pertinent diagnostic data      Results from [place] encounter on 12/10/22    MRI BRAIN W WO CONT    Impression  Previously demonstrated T2/FLAIR signal abnormality about juxtapleural ventricle  left caudate head/thalamus is much less conspicuous on current study demonstrate  no associated contrast enhancement. These are nonspecific and may represent  sequelae of prior ischemia/infarct. Attention on follow-up is advised. Bilateral mastoid effusions. MRI BRAIN WO CONT    Impression  No acute infarction. Small areas of signal abnormality in the medial left  thalamus and caudate/corona radiata inferiorly may represent subacute/chronic  infarct particularly given chronic appearing middle cerebral artery occlusion on  CTA, although slightly unusual appearance. Consider thin slice MP rage  postcontrast sequences as follow-up. Allergies   Allergen Reactions    Gold Au 198 Rash       Current Outpatient Medications   Medication Sig    budesonide-formoteroL (SYMBICORT) 160-4.5 mcg/actuation HFAA Take 2 Puffs by inhalation. clobetasoL (TEMOVATE) 0.05 % external solution Apply  to affected area two (2) times a day. fesoterodine (TOVIAZ) 4 mg SR tablet TAKE 1 TAB BY MOUTH ONCE DAILY    solifenacin (VESICARE) 5 mg tablet Take  by mouth. ibuprofen (MOTRIN) 600 mg tablet Take 1 Tablet by mouth every six (6) hours as needed for Pain. Indications: pain    butalbital-acetaminophen-caffeine (FIORICET, ESGIC) -40 mg per tablet Take 1 Tablet by mouth every four (4) hours as needed for Headache. Indications: tension headache    atorvastatin (LIPITOR) 40 mg tablet Take 1 Tablet by mouth nightly. OTHER,NON-FORMULARY, Outpatient physical therapy  Indications: double vision, dizziness    Myrbetriq 25 mg ER tablet Take 25 mg by mouth daily.     buPROPion SR (WELLBUTRIN SR) 150 mg SR tablet TAKE 1 TABLET BY MOUTH TWICE DAILY DO NOT CRUSH, CHEW OR SPLIT    DULoxetine (CYMBALTA) 60 mg capsule Take 60 mg by mouth two (2) times a day. cetirizine (ZYRTEC) 10 mg tablet Take 1 Tab by mouth daily. furosemide (LASIX) 40 mg tablet Take 40 mg by mouth daily. 1.5 tab in am and 1 tab in afternoon   Indications: CHF    carvedilol (COREG) 25 mg tablet Take 25 mg by mouth daily. aspirin 81 mg chewable tablet Take 81 mg by mouth nightly. ondansetron hcl (ZOFRAN) 4 mg tablet Take 4 mg by mouth every twelve (12) hours as needed for Nausea (1-2 tabs as needed for nausea). ferrous sulfate 324 mg (65 mg iron) tablet Take 130 mg by mouth Daily (before breakfast). Indications: IRON DEFICIENCY ANEMIA    OMEGA-3/DHA/EPA/FISH OIL (FISH OIL HIGH POTENCY PO) Take 1 Cap by mouth two (2) times a day. cholecalciferol, vitamin D3, 50 mcg (2,000 unit) tab Take 1 Tab by mouth daily. TURMERIC (CURCUMIN) 2 Caps by Does Not Apply route three (3) times daily. No current facility-administered medications for this visit. Past medical history/surgical history, family history, and social history have been reviewed for today's visit      ROS    A ten system review of constitutional, cardiovascular, respiratory, musculoskeletal, endocrine, skin, SHEENT, genitourinary, psychiatric and neurologic systems was obtained and is unremarkable except as mentioned under HPI          EXAMINATION:     Visit Vitals  BP (!) 84/46 (BP 1 Location: Left upper arm, BP Patient Position: Sitting, BP Cuff Size: Large adult)   Pulse 67   Temp 97.4 °F (36.3 °C) (Temporal)   Resp 16   Ht 5' 6\" (1.676 m)   Wt 181 lb 3.2 oz (82.2 kg)   SpO2 98%   BMI 29.25 kg/m²         General appearance: Patient is well-developed and well-nourished in no apparent distress and well groomed.     Psych/mental health:  Affect: Appropriate    PHQ  3 most recent PHQ Screens 12/20/2022   Little interest or pleasure in doing things Not at all   Feeling down, depressed, irritable, or hopeless Not at all   Total Score PHQ 2 0       HEENT:   Normocephalic  With evidence of trauma: No  Full range of motion head neck: Yes  Tenderness to palpation of the head neck region: No      Cardiovascular:     Extremities warm to touch: Yes  Extremity swelling: No  Discoloration: No  Evidence of PVD: No    Respiratory:   Dyspnea on exertion: No   Abnormal effort on casual observation: No   Use of portable oxygen: No   Evidence of cyanosis: No     Musculoskeletal:   Evidence of significant bone deformities: No   Spinal curvature: No     Integumentary:    Obvious bruising: No   Lacerations or discoloration on casual observation: No       Neurological Examination:   Mental Status:        MMSE  No flowsheet data found. Formal testing was not completed    there was nothing concerning on general observation and discussion. Alert oriented and appropriate to general conversation  Normal processing on general observation  Followed conversation and responded seemingly appropriate throughout the office visit  No word finding difficulties noted on casual observation  Able to follow directions without difficulty     Cranial Nerves:      Left pupil larger than right but both constrict down to within 1 mm with light  Direct and consensual intact bilaterally  Rt 6th palsy[ complains of pain in the opposite temporal region of gaze]  EOMs intact gaze is conjugate  No nystagmus is appreciated  Facial motor intact bilaterally  Hearing intact to conversation  Voice with normal projection, no evidence of secretion pooling  Shoulder shrug intact bilaterally  No tongue deviation appreciated     Motor:   Normal bulk  No tremor appreciated on today's exam  No abnormal movements appreciated on today's exam  Moves extremities spontaneously and with purpose  5/5 x 4      Sensation: Intact to light touch    Coordination/Cerebellar:   Grossly intact    Gait: Ambulates independently    Reflexes: Symmetrical and intact bilaterally    Fall risk assessment  No flowsheet data found.       Follow-up and Dispositions    Return in about 6 months (around 6/20/2023) for In office appointment.              Olga Simms MS, ANP-BC, [facility]

## 2022-12-20 NOTE — ASSESSMENT & PLAN NOTE
Predominantly right-sided  [However initially stated to be on the left which was not as apparent on today's visit]  Continue to wear eye patch  Refer to neuro-ophthalmology for further evaluation  We will look to repeat MRI scan of the brain in 3 to 6 months depending on patient's symptoms

## 2022-12-20 NOTE — ASSESSMENT & PLAN NOTE
Intensified since leaving the hospital  Prescribing 600 mg ibuprofen 1 4 times daily as needed but I have recommended taking at least 1 tablet a day on a routine basis    We will check inflammatory indicators and CK level

## 2022-12-20 NOTE — PATIENT INSTRUCTIONS
As per discussion    I have given you prescription strength ibuprofen 600 mg I want you to take at least 1 daily and they can take up to 3 more tablets per day if needed I would recommend taking with food to protect your stomach and if you start having problems with your stomach you can take over-the-counter Prilosec on a daily basis as well to help protect the stomach    I suspect to be continue on the ibuprofen at least 1 daily within 7 to 10 days you should start feeling some relief on a more continuous basis    A referral has been made to neuro-ophthalmology as well as ENT    Blood work has been ordered and I will go over those results via [address] and anything we need to do differently based on those results if necessary    Continue to use your eye patch routinely to help with the double vision    We will look to repeat your MRI scan of the brain in another 3 to 6 months for routine monitoring    Wishing you a very [employer] and a [address]      Appointments  Please make sure that you arrive for your next appointment at least 15 minutes prior to your appointment time. If for some reason you are going to be late please notify the office to determine if you need to be rescheduled or we can adjust your appointment time      Phone calls/patient messages:  Please allow up to 24 hours for someone in the office to contact you about your call or message. Be mindful your provider may be out of the office or your message may require further review. We encourage you to use [employer] for your messages as this is a faster, more efficient way to communicate with our office    Medication Refills:  Prescription medications require up to 48 business hours to process. We encourage you to use [employer] for your refills. For controlled medications: Please allow up to 72 business hours to process. Certain medications may require you to  a written prescription at our office.     NO narcotic/controlled medications will be prescribed after 4pm Monday through Friday or on weekends    Form/Paperwork Completion:  We ask that you allow 7-14 business days. You may also download your forms to [employer] to have your doctor print off.

## 2022-12-20 NOTE — PROGRESS NOTES
Chief Complaint   Patient presents with    Hospital Follow Up     [facility] into hospital and was just released on Thursday for the double vision and eye was not aligning with the other eye. Lots of pressure in the head.   Saw Dr Michaela Dunlap

## 2022-12-22 LAB
CCP IGA+IGG SERPL IA-ACNC: 2 UNITS (ref 0–19)
RHEUMATOID FACT SERPL-ACNC: 12.7 IU/ML (ref 0–15)

## 2022-12-23 NOTE — PROGRESS NOTES
Results of the tests were reviewed in MyChart with the patient as follows:     Your rheumatoid factors came back normal so this would suggest that you do not have inflammatory rheumatoid arthritis at this time

## 2023-02-17 ENCOUNTER — TELEPHONE (OUTPATIENT)
Dept: NEUROLOGY | Age: 63
End: 2023-02-17

## 2023-02-17 NOTE — TELEPHONE ENCOUNTER
Malik unger/ Dr Petra Morrow office (ENT) called stating that they need all of Pt's office notes sent to them. They said their fax machine is down due to electrical issues and have requested we e-mail it to them, if possible.  #:  [phone]    E-mail: [email or web address]

## 2023-05-19 RX ORDER — ATORVASTATIN CALCIUM 40 MG/1
1 TABLET, FILM COATED ORAL NIGHTLY
COMMUNITY
Start: 2022-12-15

## 2023-05-19 RX ORDER — CLOBETASOL PROPIONATE 0.46 MG/ML
SOLUTION TOPICAL 2 TIMES DAILY
COMMUNITY
Start: 2022-05-20

## 2023-05-19 RX ORDER — ASPIRIN 81 MG/1
81 TABLET, CHEWABLE ORAL
COMMUNITY

## 2023-05-19 RX ORDER — BUPROPION HYDROCHLORIDE 150 MG/1
TABLET, EXTENDED RELEASE ORAL
COMMUNITY
Start: 2022-10-21

## 2023-05-19 RX ORDER — FESOTERODINE FUMARATE 4 MG/1
TABLET, EXTENDED RELEASE ORAL
COMMUNITY
Start: 2022-11-28

## 2023-05-19 RX ORDER — FERROUS SULFATE 324(65)MG
130 TABLET, DELAYED RELEASE (ENTERIC COATED) ORAL
COMMUNITY

## 2023-05-19 RX ORDER — FUROSEMIDE 40 MG/1
40 TABLET ORAL DAILY
COMMUNITY

## 2023-05-19 RX ORDER — CARVEDILOL 25 MG/1
25 TABLET ORAL DAILY
COMMUNITY

## 2023-05-19 RX ORDER — CETIRIZINE HYDROCHLORIDE 10 MG/1
10 TABLET ORAL DAILY
COMMUNITY
Start: 2017-05-16

## 2023-05-19 RX ORDER — SOLIFENACIN SUCCINATE 5 MG/1
TABLET, FILM COATED ORAL
COMMUNITY

## 2023-05-19 RX ORDER — ONDANSETRON 4 MG/1
4 TABLET, FILM COATED ORAL
COMMUNITY

## 2023-05-19 RX ORDER — DULOXETIN HYDROCHLORIDE 60 MG/1
60 CAPSULE, DELAYED RELEASE ORAL 2 TIMES DAILY
COMMUNITY
Start: 2022-09-06

## 2023-05-19 RX ORDER — IBUPROFEN 600 MG/1
600 TABLET ORAL EVERY 6 HOURS PRN
COMMUNITY
Start: 2022-12-20

## 2023-05-19 RX ORDER — BUTALBITAL, ACETAMINOPHEN AND CAFFEINE 50; 325; 40 MG/1; MG/1; MG/1
1 TABLET ORAL EVERY 4 HOURS PRN
COMMUNITY
Start: 2022-12-15

## 2024-10-07 ENCOUNTER — HOSPITAL ENCOUNTER (EMERGENCY)
Facility: HOSPITAL | Age: 64
Discharge: HOME | End: 2024-10-07
Attending: FAMILY MEDICINE | Admitting: FAMILY MEDICINE
Payer: MEDICAID

## 2024-10-07 VITALS
DIASTOLIC BLOOD PRESSURE: 64 MMHG | TEMPERATURE: 98 F | WEIGHT: 182 LBS | OXYGEN SATURATION: 97 % | RESPIRATION RATE: 18 BRPM | HEART RATE: 58 BPM | BODY MASS INDEX: 29.25 KG/M2 | SYSTOLIC BLOOD PRESSURE: 146 MMHG | HEIGHT: 66 IN

## 2024-10-07 DIAGNOSIS — S33.5XXA LUMBAR SPRAIN, INITIAL ENCOUNTER: Primary | ICD-10-CM

## 2024-10-07 PROCEDURE — 99284 EMERGENCY DEPT VISIT MOD MDM: CPT

## 2024-10-07 PROCEDURE — 6370000000 HC RX 637 (ALT 250 FOR IP): Performed by: FAMILY MEDICINE

## 2024-10-07 PROCEDURE — 6360000002 HC RX W HCPCS: Performed by: FAMILY MEDICINE

## 2024-10-07 PROCEDURE — 96372 THER/PROPH/DIAG INJ SC/IM: CPT

## 2024-10-07 RX ORDER — NAPROXEN 500 MG/1
500 TABLET ORAL 2 TIMES DAILY PRN
Qty: 40 TABLET | Refills: 0 | Status: SHIPPED | OUTPATIENT
Start: 2024-10-07

## 2024-10-07 RX ORDER — KETOROLAC TROMETHAMINE 30 MG/ML
30 INJECTION, SOLUTION INTRAMUSCULAR; INTRAVENOUS
Status: COMPLETED | OUTPATIENT
Start: 2024-10-07 | End: 2024-10-07

## 2024-10-07 RX ORDER — METHOCARBAMOL 500 MG/1
750 TABLET, FILM COATED ORAL
Status: COMPLETED | OUTPATIENT
Start: 2024-10-07 | End: 2024-10-07

## 2024-10-07 RX ORDER — METHOCARBAMOL 750 MG/1
750 TABLET, FILM COATED ORAL 4 TIMES DAILY
Qty: 40 TABLET | Refills: 0 | Status: SHIPPED | OUTPATIENT
Start: 2024-10-07 | End: 2024-10-17

## 2024-10-07 RX ADMIN — METHOCARBAMOL 750 MG: 500 TABLET ORAL at 09:24

## 2024-10-07 RX ADMIN — KETOROLAC TROMETHAMINE 30 MG: 30 INJECTION, SOLUTION INTRAMUSCULAR at 09:24

## 2024-10-07 ASSESSMENT — PAIN - FUNCTIONAL ASSESSMENT
PAIN_FUNCTIONAL_ASSESSMENT: 0-10
PAIN_FUNCTIONAL_ASSESSMENT: PREVENTS OR INTERFERES WITH MANY ACTIVE NOT PASSIVE ACTIVITIES

## 2024-10-07 ASSESSMENT — LIFESTYLE VARIABLES
HOW OFTEN DO YOU HAVE A DRINK CONTAINING ALCOHOL: NEVER
HOW MANY STANDARD DRINKS CONTAINING ALCOHOL DO YOU HAVE ON A TYPICAL DAY: PATIENT DOES NOT DRINK

## 2024-10-07 ASSESSMENT — PAIN SCALES - GENERAL
PAINLEVEL_OUTOF10: 4
PAINLEVEL_OUTOF10: 10
PAINLEVEL_OUTOF10: 10

## 2024-10-07 ASSESSMENT — PAIN DESCRIPTION - LOCATION
LOCATION: BACK

## 2024-10-07 ASSESSMENT — PAIN DESCRIPTION - ORIENTATION
ORIENTATION: RIGHT
ORIENTATION: RIGHT;LEFT

## 2024-10-07 NOTE — DISCHARGE INSTRUCTIONS
--Naproxen 500 mg every 12 hours as needed for pain. Take with food.  --Methocarbamol 750 mg every 8 hours as needed for muscle spasm.  --OK to also take Tylenol 1000 mg every 6 hours as needed for pain.  --CBD oil to back three times daily.

## 2024-10-07 NOTE — ED TRIAGE NOTES
Middle to low back and flank pain after being pulled by dog chasing geese and a fall to the ground. No LOC , no neck pain, no thinners

## 2024-10-08 NOTE — ED PROVIDER NOTES
[facility] EMERGENCY DEP  EMERGENCY DEPARTMENT ENCOUNTER       Pt Name: Jennifer Hall  MRN: [id number]  Birthdate 1960  Date of evaluation: 10/7/2024  Provider: Terese Khalil MD   PCP: No primary care provider on file.  Note Started: 11:35 AM EDT 10/8/24     CHIEF COMPLAINT       Chief Complaint   Patient presents with    Fall        HISTORY OF PRESENT ILLNESS: 1 or more elements      History From: Patient  HPI Limitations: None     Jennifer Hall is a 64 y.o. female who presents to the ED with an injury to her back this morning shortly after she fell walking her dog. The lab mix dog saw a flock of geese and suddenly took off to luther them, pulling suddenly on the leash. Pt landed on her front, but did not injure her face or arm; at present she has severe pain in her left low back. No radiation of pain to LE, but she has pain in her back when she turns in bed or moves her arms around.       Nursing Notes were all reviewed and agreed with or any disagreements were addressed in the HPI.     REVIEW OF SYSTEMS      Review of Systems     Positives and Pertinent negatives as per HPI.    PAST HISTORY     Past Medical History:  Past Medical History:   Diagnosis Date    Asthma     Heart failure (HCC)     Hypercholesteremia     Hypertension     Ill-defined condition     Psoriasis          Past Surgical History:  Past Surgical History:   Procedure Laterality Date    OTHER SURGICAL HISTORY Bilateral     JAW plates    OTHER SURGICAL HISTORY Right     Ankle with screws and plates       Family History:  Family History   Problem Relation Age of Onset    Hypertension Father     Diabetes Father     High Cholesterol Father     Kidney Disease Father     Heart Disease Father        Social History:  Social History     Tobacco Use    Smoking status: Former     Current packs/day: 0.00     Types: Cigarettes     Quit date: 5/15/2007     Years since quittin.4    Smokeless tobacco: Never   Substance Use Topics    Alcohol use: No

## 2025-03-04 ENCOUNTER — HOSPITAL ENCOUNTER (EMERGENCY)
Facility: HOSPITAL | Age: 65
Discharge: ANOTHER HOSPITAL | End: 2025-03-05
Attending: EMERGENCY MEDICINE
Payer: MEDICAID

## 2025-03-04 ENCOUNTER — APPOINTMENT (OUTPATIENT)
Facility: HOSPITAL | Age: 65
End: 2025-03-04
Payer: MEDICAID

## 2025-03-04 DIAGNOSIS — I50.9 ACUTE CONGESTIVE HEART FAILURE, UNSPECIFIED HEART FAILURE TYPE (HCC): Primary | ICD-10-CM

## 2025-03-04 LAB
ALBUMIN SERPL-MCNC: 3.6 G/DL (ref 3.5–5)
ALBUMIN/GLOB SERPL: 0.9 (ref 1.1–2.2)
ALP SERPL-CCNC: 142 U/L (ref 45–117)
ALT SERPL-CCNC: 81 U/L (ref 12–78)
ANION GAP SERPL CALC-SCNC: 10 MMOL/L (ref 2–12)
APPEARANCE UR: CLEAR
AST SERPL-CCNC: 38 U/L (ref 15–37)
BACTERIA URNS QL MICRO: NEGATIVE /HPF
BASOPHILS # BLD: 0.04 K/UL (ref 0–0.1)
BASOPHILS NFR BLD: 0.4 % (ref 0–1)
BILIRUB SERPL-MCNC: 0.4 MG/DL (ref 0.2–1)
BILIRUB UR QL: NEGATIVE
BUN SERPL-MCNC: 12 MG/DL (ref 6–20)
BUN/CREAT SERPL: 10 (ref 12–20)
CALCIUM SERPL-MCNC: 9.1 MG/DL (ref 8.5–10.1)
CHLORIDE SERPL-SCNC: 104 MMOL/L (ref 97–108)
CO2 SERPL-SCNC: 27 MMOL/L (ref 21–32)
COLOR UR: NORMAL
CREAT SERPL-MCNC: 1.16 MG/DL (ref 0.55–1.02)
DIFFERENTIAL METHOD BLD: ABNORMAL
EOSINOPHIL # BLD: 0.3 K/UL (ref 0–0.4)
EOSINOPHIL NFR BLD: 2.6 % (ref 0–0.7)
EPITH CASTS URNS QL MICRO: NORMAL /LPF
ERYTHROCYTE [DISTWIDTH] IN BLOOD BY AUTOMATED COUNT: 13.5 % (ref 11.5–14.5)
GLOBULIN SER CALC-MCNC: 3.9 G/DL (ref 2–4)
GLUCOSE SERPL-MCNC: 147 MG/DL (ref 65–100)
GLUCOSE UR STRIP.AUTO-MCNC: NEGATIVE MG/DL
HCT VFR BLD AUTO: 41.2 % (ref 35–47)
HGB BLD-MCNC: 13.4 G/DL (ref 11.5–16)
HGB UR QL STRIP: NEGATIVE
IMM GRANULOCYTES # BLD AUTO: 0.06 K/UL (ref 0–0.04)
IMM GRANULOCYTES NFR BLD AUTO: 0.5 % (ref 0–0.5)
KETONES UR QL STRIP.AUTO: NEGATIVE MG/DL
LEUKOCYTE ESTERASE UR QL STRIP.AUTO: NEGATIVE
LYMPHOCYTES # BLD: 4.01 K/UL (ref 0.8–3.5)
LYMPHOCYTES NFR BLD: 35.4 % (ref 12–49)
MCH RBC QN AUTO: 30.2 PG (ref 26–34)
MCHC RBC AUTO-ENTMCNC: 32.5 G/DL (ref 30–36.5)
MCV RBC AUTO: 93 FL (ref 80–99)
MONOCYTES # BLD: 1.05 K/UL (ref 0–1)
MONOCYTES NFR BLD: 9.3 % (ref 5–13)
NEUTS SEG # BLD: 5.87 K/UL (ref 1.8–8)
NEUTS SEG NFR BLD: 51.8 % (ref 32–75)
NITRITE UR QL STRIP.AUTO: NEGATIVE
NRBC # BLD: 0 K/UL (ref 0–0.01)
NRBC BLD-RTO: 0 PER 100 WBC
NT PRO BNP: 1956 PG/ML (ref 0–125)
PH UR STRIP: 5.5 (ref 5–8)
PLATELET # BLD AUTO: 472 K/UL (ref 150–400)
PMV BLD AUTO: 9.6 FL (ref 8.9–12.9)
POTASSIUM SERPL-SCNC: 3.6 MMOL/L (ref 3.5–5.1)
PROT SERPL-MCNC: 7.5 G/DL (ref 6.4–8.2)
PROT UR STRIP-MCNC: NEGATIVE MG/DL
RBC # BLD AUTO: 4.43 M/UL (ref 3.8–5.2)
RBC #/AREA URNS HPF: NORMAL /HPF (ref 0–5)
SODIUM SERPL-SCNC: 141 MMOL/L (ref 136–145)
SP GR UR REFRACTOMETRY: 1.01 (ref 1–1.03)
TROPONIN I SERPL HS-MCNC: 19 NG/L (ref 0–51)
TROPONIN I SERPL HS-MCNC: 581 NG/L (ref 0–51)
URINE CULTURE IF INDICATED: NORMAL
UROBILINOGEN UR QL STRIP.AUTO: 0.2 EU/DL (ref 0.2–1)
WBC # BLD AUTO: 11.3 K/UL (ref 3.6–11)
WBC URNS QL MICRO: NORMAL /HPF (ref 0–4)

## 2025-03-04 PROCEDURE — 84484 ASSAY OF TROPONIN QUANT: CPT

## 2025-03-04 PROCEDURE — 6360000002 HC RX W HCPCS: Performed by: FAMILY MEDICINE

## 2025-03-04 PROCEDURE — 85025 COMPLETE CBC W/AUTO DIFF WBC: CPT

## 2025-03-04 PROCEDURE — 36415 COLL VENOUS BLD VENIPUNCTURE: CPT

## 2025-03-04 PROCEDURE — 6370000000 HC RX 637 (ALT 250 FOR IP): Performed by: EMERGENCY MEDICINE

## 2025-03-04 PROCEDURE — 96372 THER/PROPH/DIAG INJ SC/IM: CPT

## 2025-03-04 PROCEDURE — 81001 URINALYSIS AUTO W/SCOPE: CPT

## 2025-03-04 PROCEDURE — 83880 ASSAY OF NATRIURETIC PEPTIDE: CPT

## 2025-03-04 PROCEDURE — 71045 X-RAY EXAM CHEST 1 VIEW: CPT

## 2025-03-04 PROCEDURE — 80053 COMPREHEN METABOLIC PANEL: CPT

## 2025-03-04 PROCEDURE — 99285 EMERGENCY DEPT VISIT HI MDM: CPT

## 2025-03-04 PROCEDURE — 6360000002 HC RX W HCPCS: Performed by: EMERGENCY MEDICINE

## 2025-03-04 PROCEDURE — 93005 ELECTROCARDIOGRAM TRACING: CPT | Performed by: EMERGENCY MEDICINE

## 2025-03-04 PROCEDURE — 6370000000 HC RX 637 (ALT 250 FOR IP): Performed by: FAMILY MEDICINE

## 2025-03-04 PROCEDURE — 96375 TX/PRO/DX INJ NEW DRUG ADDON: CPT

## 2025-03-04 RX ORDER — ASPIRIN 81 MG/1
324 TABLET, CHEWABLE ORAL ONCE
Status: COMPLETED | OUTPATIENT
Start: 2025-03-04 | End: 2025-03-04

## 2025-03-04 RX ORDER — ENOXAPARIN SODIUM 100 MG/ML
60 INJECTION SUBCUTANEOUS
Status: COMPLETED | OUTPATIENT
Start: 2025-03-04 | End: 2025-03-04

## 2025-03-04 RX ORDER — FUROSEMIDE 10 MG/ML
40 INJECTION INTRAMUSCULAR; INTRAVENOUS
Status: COMPLETED | OUTPATIENT
Start: 2025-03-04 | End: 2025-03-04

## 2025-03-04 RX ORDER — NITROGLYCERIN 0.4 MG/1
0.4 TABLET SUBLINGUAL EVERY 5 MIN PRN
Status: DISCONTINUED | OUTPATIENT
Start: 2025-03-04 | End: 2025-03-05 | Stop reason: HOSPADM

## 2025-03-04 RX ORDER — ENOXAPARIN SODIUM 100 MG/ML
30 INJECTION SUBCUTANEOUS
Status: COMPLETED | OUTPATIENT
Start: 2025-03-04 | End: 2025-03-04

## 2025-03-04 RX ADMIN — ENOXAPARIN SODIUM 60 MG: 100 INJECTION SUBCUTANEOUS at 21:33

## 2025-03-04 RX ADMIN — ASPIRIN 324 MG: 81 TABLET, CHEWABLE ORAL at 20:14

## 2025-03-04 RX ADMIN — FUROSEMIDE 40 MG: 10 INJECTION, SOLUTION INTRAMUSCULAR; INTRAVENOUS at 18:19

## 2025-03-04 RX ADMIN — ENOXAPARIN SODIUM 30 MG: 100 INJECTION SUBCUTANEOUS at 20:33

## 2025-03-04 RX ADMIN — NITROGLYCERIN 0.4 MG: 0.4 TABLET SUBLINGUAL at 18:22

## 2025-03-04 ASSESSMENT — PAIN SCALES - GENERAL
PAINLEVEL_OUTOF10: 0
PAINLEVEL_OUTOF10: 0

## 2025-03-04 ASSESSMENT — PAIN - FUNCTIONAL ASSESSMENT: PAIN_FUNCTIONAL_ASSESSMENT: 0-10

## 2025-03-04 ASSESSMENT — LIFESTYLE VARIABLES
HOW MANY STANDARD DRINKS CONTAINING ALCOHOL DO YOU HAVE ON A TYPICAL DAY: PATIENT DOES NOT DRINK
HOW OFTEN DO YOU HAVE A DRINK CONTAINING ALCOHOL: NEVER

## 2025-03-04 NOTE — ED PROVIDER NOTES
[facility] EMERGENCY DEPARTMENT  EMERGENCY DEPARTMENT ENCOUNTER       Pt Name: Jennifer Hall  MRN: [id number]  Birthdate 1960  Date of evaluation: 3/4/2025  Provider: Rosa Hernandez MD   PCP: No primary care provider on file.  Note Started: 6:07 PM EST 3/4/25     CHIEF COMPLAINT       Chief Complaint   Patient presents with    Shortness of Breath        HISTORY OF PRESENT ILLNESS: 1 or more elements      History From: patient, History limited by: none     Jennifer Hall is a 64 y.o. female presents to the emergency department for gradually worsening shortness of breath over the last 2 weeks.       Please See MDM for Additional Details of the HPI/PMH  Nursing Notes were all reviewed and agreed with or any disagreements were addressed in the HPI.     REVIEW OF SYSTEMS        Positives and Pertinent negatives as per HPI.    PAST HISTORY     Past Medical History:  Past Medical History:   Diagnosis Date    Asthma     Heart failure (HCC)     Hypercholesteremia     Hypertension     Ill-defined condition     Psoriasis        Past Surgical History:  Past Surgical History:   Procedure Laterality Date    OTHER SURGICAL HISTORY Bilateral     JAW plates    OTHER SURGICAL HISTORY Right     Ankle with screws and plates       Family History:  Family History   Problem Relation Age of Onset    Hypertension Father     Diabetes Father     High Cholesterol Father     Kidney Disease Father     Heart Disease Father        Social History:  Social History     Tobacco Use    Smoking status: Former     Current packs/day: 0.00     Types: Cigarettes     Quit date: 5/15/2007     Years since quittin.8    Smokeless tobacco: Never   Substance Use Topics    Alcohol use: No    Drug use: No       Allergies:  Allergies   Allergen Reactions    Meat Extract Anaphylaxis, Hives and Palpitations    Adhesive Tape     Balsam Other (See Comments)     Reaction Type: Allergy; Severity: Moderate; Reaction(s): Allergy to drug in

## 2025-03-04 NOTE — ED TRIAGE NOTES
Pt arrived with complaint of SOB.  Pt reports she had the flu 3 weeks ago and since then has been feeling fatigued with periods of feeling short of breath, pt reports a productive cough and noted to with tachycardia, tachypnea and LEAVITT.  Pt ambulated to room 1 with a steady gait.  Pt educated on ER flow

## 2025-03-05 ENCOUNTER — APPOINTMENT (OUTPATIENT)
Facility: HOSPITAL | Age: 65
DRG: 174 | End: 2025-03-05
Attending: INTERNAL MEDICINE
Payer: MEDICAID

## 2025-03-05 ENCOUNTER — HOSPITAL ENCOUNTER (INPATIENT)
Facility: HOSPITAL | Age: 65
LOS: 3 days | Discharge: HOME | DRG: 174 | End: 2025-03-08
Attending: STUDENT IN AN ORGANIZED HEALTH CARE EDUCATION/TRAINING PROGRAM | Admitting: INTERNAL MEDICINE
Payer: MEDICAID

## 2025-03-05 VITALS
SYSTOLIC BLOOD PRESSURE: 161 MMHG | WEIGHT: 185 LBS | TEMPERATURE: 98.3 F | OXYGEN SATURATION: 96 % | RESPIRATION RATE: 19 BRPM | BODY MASS INDEX: 29.73 KG/M2 | DIASTOLIC BLOOD PRESSURE: 82 MMHG | HEIGHT: 66 IN | HEART RATE: 79 BPM

## 2025-03-05 DIAGNOSIS — I50.9 ACUTE CONGESTIVE HEART FAILURE, UNSPECIFIED HEART FAILURE TYPE (HCC): ICD-10-CM

## 2025-03-05 DIAGNOSIS — I50.1 PULMONARY EDEMA WITH CONGESTIVE HEART FAILURE (HCC): ICD-10-CM

## 2025-03-05 DIAGNOSIS — J96.01 ACUTE HYPOXIC RESPIRATORY FAILURE (HCC): Primary | ICD-10-CM

## 2025-03-05 DIAGNOSIS — I24.9 ACUTE CORONARY SYNDROME (HCC): ICD-10-CM

## 2025-03-05 DIAGNOSIS — I21.4 NSTEMI (NON-ST ELEVATED MYOCARDIAL INFARCTION) (HCC): ICD-10-CM

## 2025-03-05 LAB
ACT BLD: 222 SECS (ref 79–138)
ACT BLD: 268 SECS (ref 79–138)
ACT BLD: 274 SECS (ref 79–138)
APTT PPP: 27.2 SEC (ref 22.1–31)
APTT PPP: 33.9 SEC (ref 22.1–31)
ECHO AO ROOT DIAM: 3.5 CM
ECHO AO ROOT INDEX: 1.81 CM/M2
ECHO AR MAX VEL PISA: 3.5 M/S
ECHO AV AREA PEAK VELOCITY: 1.5 CM2
ECHO AV AREA PEAK VELOCITY: 1.5 CM2
ECHO AV AREA PEAK VELOCITY: 1.6 CM2
ECHO AV AREA PEAK VELOCITY: 1.6 CM2
ECHO AV AREA VTI: 1.7 CM2
ECHO AV AREA/BSA VTI: 0.9 CM2/M2
ECHO AV CUSP MM: 1.7 CM
ECHO AV MEAN GRADIENT: 7 MMHG
ECHO AV MEAN VELOCITY: 1.2 M/S
ECHO AV PEAK GRADIENT: 16 MMHG
ECHO AV PEAK GRADIENT: 16 MMHG
ECHO AV PEAK VELOCITY: 2 M/S
ECHO AV PEAK VELOCITY: 2 M/S
ECHO AV REGURGITANT PHT: 473 MS
ECHO AV VTI: 34.4 CM
ECHO BSA: 1.98 M2
ECHO BSA: 1.98 M2
ECHO LA DIAMETER INDEX: 2.38 CM/M2
ECHO LA DIAMETER: 4.6 CM
ECHO LA TO AORTIC ROOT RATIO: 1.31
ECHO LA VOL A-L A2C: 86 ML (ref 22–52)
ECHO LA VOL A-L A4C: 88 ML (ref 22–52)
ECHO LA VOL MOD A2C: 84 ML (ref 22–52)
ECHO LA VOL MOD A4C: 83 ML (ref 22–52)
ECHO LA VOLUME AREA LENGTH: 89 ML
ECHO LA VOLUME INDEX A-L A2C: 45 ML/M2 (ref 16–34)
ECHO LA VOLUME INDEX A-L A4C: 46 ML/M2 (ref 16–34)
ECHO LA VOLUME INDEX AREA LENGTH: 46 ML/M2 (ref 16–34)
ECHO LA VOLUME INDEX MOD A2C: 44 ML/M2 (ref 16–34)
ECHO LA VOLUME INDEX MOD A4C: 43 ML/M2 (ref 16–34)
ECHO LV E' LATERAL VELOCITY: 6.59 CM/S
ECHO LV E' SEPTAL VELOCITY: 6.69 CM/S
ECHO LV EF PHYSICIAN: 40 %
ECHO LV FRACTIONAL SHORTENING: 19 % (ref 28–44)
ECHO LV INTERNAL DIMENSION DIASTOLE INDEX: 2.8 CM/M2
ECHO LV INTERNAL DIMENSION DIASTOLIC: 5.4 CM (ref 3.9–5.3)
ECHO LV INTERNAL DIMENSION SYSTOLIC INDEX: 2.28 CM/M2
ECHO LV INTERNAL DIMENSION SYSTOLIC: 4.4 CM
ECHO LV IVSD: 1.4 CM (ref 0.6–0.9)
ECHO LV MASS 2D: 328.3 G (ref 67–162)
ECHO LV MASS INDEX 2D: 170.1 G/M2 (ref 43–95)
ECHO LV POSTERIOR WALL DIASTOLIC: 1.4 CM (ref 0.6–0.9)
ECHO LV RELATIVE WALL THICKNESS RATIO: 0.52
ECHO LVOT AREA: 3.8 CM2
ECHO LVOT AV VTI INDEX: 0.48
ECHO LVOT DIAM: 2.2 CM
ECHO LVOT MEAN GRADIENT: 2 MMHG
ECHO LVOT PEAK GRADIENT: 3 MMHG
ECHO LVOT PEAK GRADIENT: 3 MMHG
ECHO LVOT PEAK VELOCITY: 0.8 M/S
ECHO LVOT PEAK VELOCITY: 0.8 M/S
ECHO LVOT STROKE VOLUME INDEX: 32.3 ML/M2
ECHO LVOT SV: 62.3 ML
ECHO LVOT VTI: 16.4 CM
ECHO MV A VELOCITY: 0.73 M/S
ECHO MV AREA VTI: 2.2 CM2
ECHO MV E DECELERATION TIME (DT): 202.3 MS
ECHO MV E VELOCITY: 0.55 M/S
ECHO MV E/A RATIO: 0.75
ECHO MV E/E' LATERAL: 8.35
ECHO MV E/E' RATIO (AVERAGED): 8.28
ECHO MV E/E' SEPTAL: 8.22
ECHO MV LVOT VTI INDEX: 1.76
ECHO MV MAX VELOCITY: 1 M/S
ECHO MV MEAN GRADIENT: 2 MMHG
ECHO MV MEAN VELOCITY: 0.7 M/S
ECHO MV PEAK GRADIENT: 4 MMHG
ECHO MV VTI: 28.9 CM
ECHO PV MAX VELOCITY: 1.1 M/S
ECHO PV PEAK GRADIENT: 5 MMHG
ECHO RV FREE WALL PEAK S': 11.9 CM/S
EKG ATRIAL RATE: 106 BPM
EKG ATRIAL RATE: 78 BPM
EKG DIAGNOSIS: NORMAL
EKG DIAGNOSIS: NORMAL
EKG P AXIS: 39 DEGREES
EKG P AXIS: 74 DEGREES
EKG P-R INTERVAL: 154 MS
EKG P-R INTERVAL: 194 MS
EKG Q-T INTERVAL: 348 MS
EKG Q-T INTERVAL: 456 MS
EKG QRS DURATION: 100 MS
EKG QRS DURATION: 104 MS
EKG QTC CALCULATION (BAZETT): 462 MS
EKG QTC CALCULATION (BAZETT): 519 MS
EKG R AXIS: 74 DEGREES
EKG R AXIS: 96 DEGREES
EKG T AXIS: -56 DEGREES
EKG T AXIS: 146 DEGREES
EKG VENTRICULAR RATE: 106 BPM
EKG VENTRICULAR RATE: 78 BPM
ERYTHROCYTE [DISTWIDTH] IN BLOOD BY AUTOMATED COUNT: 13.6 % (ref 11.5–14.5)
ERYTHROCYTE [DISTWIDTH] IN BLOOD BY AUTOMATED COUNT: 13.6 % (ref 11.5–14.5)
HCT VFR BLD AUTO: 35.1 % (ref 35–47)
HCT VFR BLD AUTO: 35.5 % (ref 35–47)
HGB BLD-MCNC: 11.7 G/DL (ref 11.5–16)
HGB BLD-MCNC: 12.1 G/DL (ref 11.5–16)
INR PPP: 1 (ref 0.9–1.1)
INR PPP: 1 (ref 0.9–1.1)
MCH RBC QN AUTO: 30.9 PG (ref 26–34)
MCH RBC QN AUTO: 31.1 PG (ref 26–34)
MCHC RBC AUTO-ENTMCNC: 33.3 G/DL (ref 30–36.5)
MCHC RBC AUTO-ENTMCNC: 34.1 G/DL (ref 30–36.5)
MCV RBC AUTO: 91.3 FL (ref 80–99)
MCV RBC AUTO: 92.6 FL (ref 80–99)
NRBC # BLD: 0 K/UL (ref 0–0.01)
NRBC # BLD: 0 K/UL (ref 0–0.01)
NRBC BLD-RTO: 0 PER 100 WBC
NRBC BLD-RTO: 0 PER 100 WBC
PLATELET # BLD AUTO: 341 K/UL (ref 150–400)
PLATELET # BLD AUTO: 355 K/UL (ref 150–400)
PMV BLD AUTO: 9.4 FL (ref 8.9–12.9)
PMV BLD AUTO: 9.8 FL (ref 8.9–12.9)
PROTHROMBIN TIME: 10.2 SEC (ref 9.2–11.2)
PROTHROMBIN TIME: 10.8 SEC (ref 9.2–11.2)
RBC # BLD AUTO: 3.79 M/UL (ref 3.8–5.2)
RBC # BLD AUTO: 3.89 M/UL (ref 3.8–5.2)
THERAPEUTIC RANGE: ABNORMAL SECS (ref 58–77)
THERAPEUTIC RANGE: NORMAL SECS (ref 58–77)
TROPONIN I SERPL HS-MCNC: 2697 NG/L (ref 0–51)
UFH PPP CHRO-ACNC: 0.7 IU/ML
UFH PPP CHRO-ACNC: 0.72 IU/ML
WBC # BLD AUTO: 8.5 K/UL (ref 3.6–11)
WBC # BLD AUTO: 8.7 K/UL (ref 3.6–11)

## 2025-03-05 PROCEDURE — 2709999900 HC NON-CHARGEABLE SUPPLY: Performed by: STUDENT IN AN ORGANIZED HEALTH CARE EDUCATION/TRAINING PROGRAM

## 2025-03-05 PROCEDURE — 96375 TX/PRO/DX INJ NEW DRUG ADDON: CPT

## 2025-03-05 PROCEDURE — C9600 PERC DRUG-EL COR STENT SING: HCPCS | Performed by: STUDENT IN AN ORGANIZED HEALTH CARE EDUCATION/TRAINING PROGRAM

## 2025-03-05 PROCEDURE — 6370000000 HC RX 637 (ALT 250 FOR IP): Performed by: INTERNAL MEDICINE

## 2025-03-05 PROCEDURE — 85610 PROTHROMBIN TIME: CPT

## 2025-03-05 PROCEDURE — 99152 MOD SED SAME PHYS/QHP 5/>YRS: CPT | Performed by: STUDENT IN AN ORGANIZED HEALTH CARE EDUCATION/TRAINING PROGRAM

## 2025-03-05 PROCEDURE — C1874 STENT, COATED/COV W/DEL SYS: HCPCS | Performed by: STUDENT IN AN ORGANIZED HEALTH CARE EDUCATION/TRAINING PROGRAM

## 2025-03-05 PROCEDURE — 6360000002 HC RX W HCPCS: Performed by: INTERNAL MEDICINE

## 2025-03-05 PROCEDURE — 85730 THROMBOPLASTIN TIME PARTIAL: CPT

## 2025-03-05 PROCEDURE — C1725 CATH, TRANSLUMIN NON-LASER: HCPCS | Performed by: STUDENT IN AN ORGANIZED HEALTH CARE EDUCATION/TRAINING PROGRAM

## 2025-03-05 PROCEDURE — 6370000000 HC RX 637 (ALT 250 FOR IP): Performed by: STUDENT IN AN ORGANIZED HEALTH CARE EDUCATION/TRAINING PROGRAM

## 2025-03-05 PROCEDURE — 6360000002 HC RX W HCPCS: Performed by: STUDENT IN AN ORGANIZED HEALTH CARE EDUCATION/TRAINING PROGRAM

## 2025-03-05 PROCEDURE — C1887 CATHETER, GUIDING: HCPCS | Performed by: STUDENT IN AN ORGANIZED HEALTH CARE EDUCATION/TRAINING PROGRAM

## 2025-03-05 PROCEDURE — 99153 MOD SED SAME PHYS/QHP EA: CPT | Performed by: STUDENT IN AN ORGANIZED HEALTH CARE EDUCATION/TRAINING PROGRAM

## 2025-03-05 PROCEDURE — C1894 INTRO/SHEATH, NON-LASER: HCPCS | Performed by: STUDENT IN AN ORGANIZED HEALTH CARE EDUCATION/TRAINING PROGRAM

## 2025-03-05 PROCEDURE — C1760 CLOSURE DEV, VASC: HCPCS | Performed by: STUDENT IN AN ORGANIZED HEALTH CARE EDUCATION/TRAINING PROGRAM

## 2025-03-05 PROCEDURE — 6360000004 HC RX CONTRAST MEDICATION: Performed by: STUDENT IN AN ORGANIZED HEALTH CARE EDUCATION/TRAINING PROGRAM

## 2025-03-05 PROCEDURE — 2500000003 HC RX 250 WO HCPCS: Performed by: STUDENT IN AN ORGANIZED HEALTH CARE EDUCATION/TRAINING PROGRAM

## 2025-03-05 PROCEDURE — B2111ZZ FLUOROSCOPY OF MULTIPLE CORONARY ARTERIES USING LOW OSMOLAR CONTRAST: ICD-10-PCS | Performed by: STUDENT IN AN ORGANIZED HEALTH CARE EDUCATION/TRAINING PROGRAM

## 2025-03-05 PROCEDURE — C1713 ANCHOR/SCREW BN/BN,TIS/BN: HCPCS | Performed by: STUDENT IN AN ORGANIZED HEALTH CARE EDUCATION/TRAINING PROGRAM

## 2025-03-05 PROCEDURE — 93306 TTE W/DOPPLER COMPLETE: CPT

## 2025-03-05 PROCEDURE — B2151ZZ FLUOROSCOPY OF LEFT HEART USING LOW OSMOLAR CONTRAST: ICD-10-PCS | Performed by: STUDENT IN AN ORGANIZED HEALTH CARE EDUCATION/TRAINING PROGRAM

## 2025-03-05 PROCEDURE — C1753 CATH, INTRAVAS ULTRASOUND: HCPCS | Performed by: STUDENT IN AN ORGANIZED HEALTH CARE EDUCATION/TRAINING PROGRAM

## 2025-03-05 PROCEDURE — 85520 HEPARIN ASSAY: CPT

## 2025-03-05 PROCEDURE — 85027 COMPLETE CBC AUTOMATED: CPT

## 2025-03-05 PROCEDURE — 027034Z DILATION OF CORONARY ARTERY, ONE ARTERY WITH DRUG-ELUTING INTRALUMINAL DEVICE, PERCUTANEOUS APPROACH: ICD-10-PCS | Performed by: STUDENT IN AN ORGANIZED HEALTH CARE EDUCATION/TRAINING PROGRAM

## 2025-03-05 PROCEDURE — 85347 COAGULATION TIME ACTIVATED: CPT

## 2025-03-05 PROCEDURE — 6370000000 HC RX 637 (ALT 250 FOR IP): Performed by: FAMILY MEDICINE

## 2025-03-05 PROCEDURE — C1769 GUIDE WIRE: HCPCS | Performed by: STUDENT IN AN ORGANIZED HEALTH CARE EDUCATION/TRAINING PROGRAM

## 2025-03-05 PROCEDURE — 99285 EMERGENCY DEPT VISIT HI MDM: CPT

## 2025-03-05 PROCEDURE — 36415 COLL VENOUS BLD VENIPUNCTURE: CPT

## 2025-03-05 PROCEDURE — 2500000003 HC RX 250 WO HCPCS: Performed by: INTERNAL MEDICINE

## 2025-03-05 PROCEDURE — 93458 L HRT ARTERY/VENTRICLE ANGIO: CPT | Performed by: STUDENT IN AN ORGANIZED HEALTH CARE EDUCATION/TRAINING PROGRAM

## 2025-03-05 PROCEDURE — 84484 ASSAY OF TROPONIN QUANT: CPT

## 2025-03-05 PROCEDURE — 2580000003 HC RX 258: Performed by: STUDENT IN AN ORGANIZED HEALTH CARE EDUCATION/TRAINING PROGRAM

## 2025-03-05 PROCEDURE — 96365 THER/PROPH/DIAG IV INF INIT: CPT

## 2025-03-05 PROCEDURE — 6360000002 HC RX W HCPCS: Performed by: FAMILY MEDICINE

## 2025-03-05 PROCEDURE — 2060000000 HC ICU INTERMEDIATE R&B

## 2025-03-05 PROCEDURE — 76937 US GUIDE VASCULAR ACCESS: CPT | Performed by: STUDENT IN AN ORGANIZED HEALTH CARE EDUCATION/TRAINING PROGRAM

## 2025-03-05 PROCEDURE — 4A023N7 MEASUREMENT OF CARDIAC SAMPLING AND PRESSURE, LEFT HEART, PERCUTANEOUS APPROACH: ICD-10-PCS | Performed by: STUDENT IN AN ORGANIZED HEALTH CARE EDUCATION/TRAINING PROGRAM

## 2025-03-05 DEVICE — STENT ONYXNG27530UX ONYX 2.75X30RX
Type: IMPLANTABLE DEVICE | Status: FUNCTIONAL
Brand: ONYX FRONTIER™

## 2025-03-05 RX ORDER — POLYETHYLENE GLYCOL 3350 17 G/17G
17 POWDER, FOR SOLUTION ORAL DAILY
Status: DISCONTINUED | OUTPATIENT
Start: 2025-03-06 | End: 2025-03-08 | Stop reason: HOSPADM

## 2025-03-05 RX ORDER — SODIUM CHLORIDE 9 MG/ML
INJECTION, SOLUTION INTRAVENOUS PRN
Status: DISCONTINUED | OUTPATIENT
Start: 2025-03-05 | End: 2025-03-08 | Stop reason: HOSPADM

## 2025-03-05 RX ORDER — ONDANSETRON 2 MG/ML
4 INJECTION INTRAMUSCULAR; INTRAVENOUS EVERY 6 HOURS PRN
Status: DISCONTINUED | OUTPATIENT
Start: 2025-03-05 | End: 2025-03-05

## 2025-03-05 RX ORDER — CLOPIDOGREL 300 MG/1
600 TABLET, FILM COATED ORAL ONCE
Status: DISCONTINUED | OUTPATIENT
Start: 2025-03-05 | End: 2025-03-05

## 2025-03-05 RX ORDER — ONDANSETRON 2 MG/ML
4 INJECTION INTRAMUSCULAR; INTRAVENOUS EVERY 6 HOURS PRN
Status: DISCONTINUED | OUTPATIENT
Start: 2025-03-05 | End: 2025-03-08 | Stop reason: HOSPADM

## 2025-03-05 RX ORDER — SODIUM CHLORIDE 0.9 % (FLUSH) 0.9 %
5-40 SYRINGE (ML) INJECTION EVERY 12 HOURS SCHEDULED
Status: DISCONTINUED | OUTPATIENT
Start: 2025-03-05 | End: 2025-03-08 | Stop reason: HOSPADM

## 2025-03-05 RX ORDER — IOPAMIDOL 755 MG/ML
INJECTION, SOLUTION INTRAVASCULAR PRN
Status: DISCONTINUED | OUTPATIENT
Start: 2025-03-05 | End: 2025-03-05 | Stop reason: HOSPADM

## 2025-03-05 RX ORDER — CLOPIDOGREL BISULFATE 75 MG/1
75 TABLET ORAL DAILY
Status: DISCONTINUED | OUTPATIENT
Start: 2025-03-06 | End: 2025-03-08 | Stop reason: HOSPADM

## 2025-03-05 RX ORDER — ATORVASTATIN CALCIUM 40 MG/1
40 TABLET, FILM COATED ORAL NIGHTLY
Status: DISCONTINUED | OUTPATIENT
Start: 2025-03-05 | End: 2025-03-08 | Stop reason: HOSPADM

## 2025-03-05 RX ORDER — SODIUM CHLORIDE 0.9 % (FLUSH) 0.9 %
5-40 SYRINGE (ML) INJECTION PRN
Status: DISCONTINUED | OUTPATIENT
Start: 2025-03-05 | End: 2025-03-08 | Stop reason: HOSPADM

## 2025-03-05 RX ORDER — FENTANYL CITRATE 50 UG/ML
INJECTION, SOLUTION INTRAMUSCULAR; INTRAVENOUS PRN
Status: DISCONTINUED | OUTPATIENT
Start: 2025-03-05 | End: 2025-03-05 | Stop reason: HOSPADM

## 2025-03-05 RX ORDER — SODIUM CHLORIDE 9 MG/ML
INJECTION, SOLUTION INTRAVENOUS CONTINUOUS
Status: ACTIVE | OUTPATIENT
Start: 2025-03-05 | End: 2025-03-05

## 2025-03-05 RX ORDER — ACETAMINOPHEN 325 MG/1
650 TABLET ORAL EVERY 6 HOURS PRN
Status: DISCONTINUED | OUTPATIENT
Start: 2025-03-05 | End: 2025-03-05

## 2025-03-05 RX ORDER — LIDOCAINE HYDROCHLORIDE 10 MG/ML
INJECTION, SOLUTION INFILTRATION; PERINEURAL PRN
Status: DISCONTINUED | OUTPATIENT
Start: 2025-03-05 | End: 2025-03-05 | Stop reason: HOSPADM

## 2025-03-05 RX ORDER — ONDANSETRON 2 MG/ML
4 INJECTION INTRAMUSCULAR; INTRAVENOUS ONCE
Status: COMPLETED | OUTPATIENT
Start: 2025-03-05 | End: 2025-03-05

## 2025-03-05 RX ORDER — HEPARIN SODIUM 1000 [USP'U]/ML
INJECTION, SOLUTION INTRAVENOUS; SUBCUTANEOUS PRN
Status: DISCONTINUED | OUTPATIENT
Start: 2025-03-05 | End: 2025-03-05 | Stop reason: HOSPADM

## 2025-03-05 RX ORDER — HYDRALAZINE HYDROCHLORIDE 20 MG/ML
20 INJECTION INTRAMUSCULAR; INTRAVENOUS EVERY 6 HOURS PRN
Status: DISCONTINUED | OUTPATIENT
Start: 2025-03-05 | End: 2025-03-08 | Stop reason: HOSPADM

## 2025-03-05 RX ORDER — CLOPIDOGREL 300 MG/1
TABLET, FILM COATED ORAL PRN
Status: DISCONTINUED | OUTPATIENT
Start: 2025-03-05 | End: 2025-03-05 | Stop reason: HOSPADM

## 2025-03-05 RX ORDER — TROSPIUM CHLORIDE 20 MG/1
20 TABLET, FILM COATED ORAL
Status: DISCONTINUED | OUTPATIENT
Start: 2025-03-05 | End: 2025-03-08 | Stop reason: HOSPADM

## 2025-03-05 RX ORDER — VERAPAMIL HYDROCHLORIDE 2.5 MG/ML
INJECTION, SOLUTION INTRAVENOUS PRN
Status: DISCONTINUED | OUTPATIENT
Start: 2025-03-05 | End: 2025-03-05 | Stop reason: HOSPADM

## 2025-03-05 RX ORDER — ACETAMINOPHEN 325 MG/1
650 TABLET ORAL EVERY 6 HOURS PRN
Status: DISCONTINUED | OUTPATIENT
Start: 2025-03-05 | End: 2025-03-08 | Stop reason: HOSPADM

## 2025-03-05 RX ORDER — HEPARIN SODIUM 10000 [USP'U]/100ML
5-30 INJECTION, SOLUTION INTRAVENOUS CONTINUOUS
Status: DISCONTINUED | OUTPATIENT
Start: 2025-03-05 | End: 2025-03-05

## 2025-03-05 RX ORDER — SACUBITRIL AND VALSARTAN 24; 26 MG/1; MG/1
1 TABLET, FILM COATED ORAL 2 TIMES DAILY
Status: DISCONTINUED | OUTPATIENT
Start: 2025-03-05 | End: 2025-03-06

## 2025-03-05 RX ORDER — ASPIRIN 81 MG/1
81 TABLET, CHEWABLE ORAL DAILY
Status: DISCONTINUED | OUTPATIENT
Start: 2025-03-05 | End: 2025-03-08 | Stop reason: HOSPADM

## 2025-03-05 RX ORDER — DULOXETIN HYDROCHLORIDE 30 MG/1
60 CAPSULE, DELAYED RELEASE ORAL 2 TIMES DAILY
Status: DISCONTINUED | OUTPATIENT
Start: 2025-03-05 | End: 2025-03-08 | Stop reason: HOSPADM

## 2025-03-05 RX ORDER — HEPARIN SODIUM 1000 [USP'U]/ML
2000 INJECTION, SOLUTION INTRAVENOUS; SUBCUTANEOUS PRN
Status: DISCONTINUED | OUTPATIENT
Start: 2025-03-05 | End: 2025-03-05 | Stop reason: HOSPADM

## 2025-03-05 RX ORDER — SACUBITRIL AND VALSARTAN 24; 26 MG/1; MG/1
1 TABLET, FILM COATED ORAL 2 TIMES DAILY
Status: ON HOLD | COMMUNITY
End: 2025-03-08 | Stop reason: HOSPADM

## 2025-03-05 RX ORDER — CARVEDILOL 12.5 MG/1
25 TABLET ORAL DAILY
Status: DISCONTINUED | OUTPATIENT
Start: 2025-03-05 | End: 2025-03-06

## 2025-03-05 RX ORDER — BISACODYL 10 MG
10 SUPPOSITORY, RECTAL RECTAL DAILY PRN
Status: DISCONTINUED | OUTPATIENT
Start: 2025-03-05 | End: 2025-03-08 | Stop reason: HOSPADM

## 2025-03-05 RX ORDER — HEPARIN SODIUM 10000 [USP'U]/ML
INJECTION, SOLUTION INTRAVENOUS; SUBCUTANEOUS PRN
Status: DISCONTINUED | OUTPATIENT
Start: 2025-03-05 | End: 2025-03-05 | Stop reason: HOSPADM

## 2025-03-05 RX ORDER — HEPARIN SODIUM 1000 [USP'U]/ML
60 INJECTION, SOLUTION INTRAVENOUS; SUBCUTANEOUS PRN
Status: DISCONTINUED | OUTPATIENT
Start: 2025-03-05 | End: 2025-03-05

## 2025-03-05 RX ORDER — HEPARIN SODIUM 10000 [USP'U]/100ML
5-30 INJECTION, SOLUTION INTRAVENOUS CONTINUOUS
Status: DISCONTINUED | OUTPATIENT
Start: 2025-03-05 | End: 2025-03-05 | Stop reason: HOSPADM

## 2025-03-05 RX ORDER — HEPARIN SODIUM 1000 [USP'U]/ML
2000 INJECTION, SOLUTION INTRAVENOUS; SUBCUTANEOUS PRN
Status: DISCONTINUED | OUTPATIENT
Start: 2025-03-05 | End: 2025-03-05

## 2025-03-05 RX ORDER — BUPROPION HYDROCHLORIDE 150 MG/1
150 TABLET, EXTENDED RELEASE ORAL DAILY
Status: DISCONTINUED | OUTPATIENT
Start: 2025-03-05 | End: 2025-03-06 | Stop reason: SDUPTHER

## 2025-03-05 RX ORDER — ACETAMINOPHEN 650 MG/1
650 SUPPOSITORY RECTAL EVERY 6 HOURS PRN
Status: DISCONTINUED | OUTPATIENT
Start: 2025-03-05 | End: 2025-03-08 | Stop reason: HOSPADM

## 2025-03-05 RX ORDER — ONDANSETRON 4 MG/1
4 TABLET, ORALLY DISINTEGRATING ORAL EVERY 8 HOURS PRN
Status: DISCONTINUED | OUTPATIENT
Start: 2025-03-05 | End: 2025-03-08 | Stop reason: HOSPADM

## 2025-03-05 RX ORDER — FUROSEMIDE 10 MG/ML
40 INJECTION INTRAMUSCULAR; INTRAVENOUS 2 TIMES DAILY
Status: DISCONTINUED | OUTPATIENT
Start: 2025-03-05 | End: 2025-03-05

## 2025-03-05 RX ORDER — CLOPIDOGREL BISULFATE 75 MG/1
75 TABLET ORAL DAILY
Status: DISCONTINUED | OUTPATIENT
Start: 2025-03-06 | End: 2025-03-05

## 2025-03-05 RX ORDER — ACETAMINOPHEN 325 MG/1
650 TABLET ORAL EVERY 4 HOURS PRN
Status: DISCONTINUED | OUTPATIENT
Start: 2025-03-05 | End: 2025-03-07 | Stop reason: SDUPTHER

## 2025-03-05 RX ADMIN — NITROGLYCERIN 1 INCH: 20 OINTMENT TOPICAL at 02:46

## 2025-03-05 RX ADMIN — FUROSEMIDE 40 MG: 10 INJECTION, SOLUTION INTRAMUSCULAR; INTRAVENOUS at 08:31

## 2025-03-05 RX ADMIN — ONDANSETRON 4 MG: 2 INJECTION, SOLUTION INTRAMUSCULAR; INTRAVENOUS at 03:18

## 2025-03-05 RX ADMIN — HEPARIN SODIUM 11 UNITS/KG/HR: 10000 INJECTION, SOLUTION INTRAVENOUS at 03:15

## 2025-03-05 RX ADMIN — SODIUM CHLORIDE: 0.9 INJECTION, SOLUTION INTRAVENOUS at 17:52

## 2025-03-05 RX ADMIN — ASPIRIN 81 MG: 81 TABLET, CHEWABLE ORAL at 08:31

## 2025-03-05 RX ADMIN — HEPARIN SODIUM AND DEXTROSE 11 UNITS/KG/HR: 10000; 5 INJECTION INTRAVENOUS at 06:44

## 2025-03-05 RX ADMIN — ACETAMINOPHEN 650 MG: 325 TABLET ORAL at 07:35

## 2025-03-05 RX ADMIN — SACUBITRIL AND VALSARTAN 1 TABLET: 24; 26 TABLET, FILM COATED ORAL at 20:39

## 2025-03-05 RX ADMIN — SODIUM CHLORIDE, PRESERVATIVE FREE 10 ML: 5 INJECTION INTRAVENOUS at 08:32

## 2025-03-05 RX ADMIN — ACETAMINOPHEN 650 MG: 325 TABLET ORAL at 20:38

## 2025-03-05 RX ADMIN — DULOXETINE HYDROCHLORIDE 60 MG: 30 CAPSULE, DELAYED RELEASE ORAL at 20:38

## 2025-03-05 RX ADMIN — ATORVASTATIN CALCIUM 40 MG: 40 TABLET, FILM COATED ORAL at 20:39

## 2025-03-05 RX ADMIN — CARVEDILOL 25 MG: 12.5 TABLET, FILM COATED ORAL at 08:31

## 2025-03-05 ASSESSMENT — PAIN SCALES - GENERAL
PAINLEVEL_OUTOF10: 5
PAINLEVEL_OUTOF10: 5
PAINLEVEL_OUTOF10: 6
PAINLEVEL_OUTOF10: 3

## 2025-03-05 ASSESSMENT — PAIN DESCRIPTION - DESCRIPTORS
DESCRIPTORS: ACHING
DESCRIPTORS: SORE
DESCRIPTORS: ACHING

## 2025-03-05 ASSESSMENT — PAIN DESCRIPTION - PAIN TYPE: TYPE: ACUTE PAIN;SURGICAL PAIN

## 2025-03-05 ASSESSMENT — PAIN DESCRIPTION - ORIENTATION
ORIENTATION: MID
ORIENTATION: MID
ORIENTATION: ANTERIOR
ORIENTATION: RIGHT;LEFT

## 2025-03-05 ASSESSMENT — PAIN DESCRIPTION - LOCATION
LOCATION: HEAD
LOCATION: HEAD
LOCATION: HEAD;CHEST
LOCATION: CHEST

## 2025-03-05 NOTE — H&P
MCHC 34.1 30.0 - 36.5 g/dL    RDW 13.6 11.5 - 14.5 %    Platelets 355 150 - 400 K/uL    MPV 9.4 8.9 - 12.9 FL    Nucleated RBCs 0.0 0  WBC    nRBC 0.00 0.00 - 0.01 K/uL   APTT    Collection Time: 03/05/25  3:09 AM   Result Value Ref Range    APTT 27.2 22.1 - 31.0 sec    Therapeutic Range   58.0 - 77.0 SECS   Protime-INR    Collection Time: 03/05/25  3:09 AM   Result Value Ref Range    INR 1.0 0.9 - 1.1      Protime 10.2 9.2 - 11.2 sec   Anti-Xa, Unfractionated Heparin    Collection Time: 03/05/25  3:09 AM   Result Value Ref Range    Heparin Xa,LMWH and Unfrac 0.70 IU/mL   CBC    Collection Time: 03/05/25  6:40 AM   Result Value Ref Range    WBC 8.5 3.6 - 11.0 K/uL    RBC 3.79 (L) 3.80 - 5.20 M/uL    Hemoglobin 11.7 11.5 - 16.0 g/dL    Hematocrit 35.1 35.0 - 47.0 %    MCV 92.6 80.0 - 99.0 FL    MCH 30.9 26.0 - 34.0 PG    MCHC 33.3 30.0 - 36.5 g/dL    RDW 13.6 11.5 - 14.5 %    Platelets 341 150 - 400 K/uL    MPV 9.8 8.9 - 12.9 FL    Nucleated RBCs 0.0 0  WBC    nRBC 0.00 0.00 - 0.01 K/uL   APTT    Collection Time: 03/05/25  6:40 AM   Result Value Ref Range    APTT 33.9 (H) 22.1 - 31.0 sec    Therapeutic Range   58.0 - 77.0 SECS   Protime-INR    Collection Time: 03/05/25  6:40 AM   Result Value Ref Range    INR 1.0 0.9 - 1.1      Protime 10.8 9.2 - 11.2 sec   Anti-Xa, Unfractionated Heparin    Collection Time: 03/05/25  6:40 AM   Result Value Ref Range    Heparin Xa,LMWH and Unfrac 0.72 IU/mL         XR CHEST PORTABLE    Result Date: 3/4/2025  Clinical history: SOB INDICATION:   SOB COMPARISON: 2022 FINDINGS: AP portable upright view of the chest demonstrates an enlarged cardiopericardial silhouette.There is associated increased interstitial opacity.There is no pneumothorax. Patient is on a cardiac monitor.     Cardiomegaly and mild interstitial pulmonary edema. Electronically signed by MARCO ANTONIO TAYLOR     _______________________________________________________________________    TOTAL TIME:  76  Minutes    Critical Care Provided     Minutes non procedure based    Signed: Alex Paredes Jr, MD    Procedures: see electronic medical records for all procedures/Xrays and details which were not copied into this note but were reviewed prior to creation of Plan.

## 2025-03-05 NOTE — CONSULTS
[facility]  [address], [place] [id number] [phone]  [email or web address]       CARDIOLOGY CONSULTATION       Date of  Admission: 3/5/2025  5:56 AM     Admission type:Emergency   Primary Care Physician:No primary care provider on file.     Attending Provider: Alex Paredes Jr., MD  Cardiology Provider: [facility]    CC/REASON FOR CONSULT: NSTEMI     Subjective:     Jennifer Hall is a 64 y.o. female admitted for Non-ST elevation MI (NSTEMI) ([facility]) [I21.4].patient was seen and examined in the emergency room #14.  Patient has a longstanding cardiac history of systolic heart failure and follows with [facility].  She was recently seen in the office for influenza infection and symptoms resulting from that and some of her medications were stopped.  The patient reports a multitude of symptoms including preeminent symptoms of decreased energy, increased sleeping, shortness of breath.  She also had upper respiratory infection symptoms like very severe cough with eventually resulting sore throat.  Sputum color seems to be variable but mostly clear.  She eventually also had stomach upset/diarrheal illness.  Yesterday evening, the patient experienced chest discomfort that appears to have improved after furosemide.    Notably, her most prominent symptom is marked shortness of breath with minimal exertion and marked fatigue.  She first lost about 8 pounds over the first week of her illness and then seems to have gained 8 pounds.  She was taking carvedilol as needed for palpitations for comfort.      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

## 2025-03-05 NOTE — ED NOTES
Per Davon Cabral, pull levels now, start heparin at 11units/kg/hr and then when levels come back we can adjust heparin based off the updated levels.

## 2025-03-05 NOTE — ED PROVIDER NOTES
[facility] EMERGENCY DEPARTMENT  EMERGENCY DEPARTMENT ENCOUNTER       Pt Name: Jennifer Hall  MRN: [id number]  Birthdate 1960  Date of evaluation: 3/5/2025  Provider: Alex Gilliam MD   PCP: No primary care provider on file.  Note Started: 6:12 AM EST 3/5/25     CHIEF COMPLAINT       Chief Complaint   Patient presents with    Transfer     Pt is a transfer from [facility] for NSTEMI     HISTORY OF PRESENT ILLNESS: 1 or more elements      History From: patient, History limited by: none     Jennifer Hall is a 64 y.o. female w hx of CHF who presented to [facility] with shortness of breath.  Required O2.  Found to have elevated troponin.  Improved with diuresis.  Transferred to [facility] after acceptance by hospital medicine service, but no bed assignment.  She reports that she feels improved.    Please See MDM for Additional Details of the HPI/PMH  Nursing Notes were all reviewed and agreed with or any disagreements were addressed in the HPI.     REVIEW OF SYSTEMS        Positives and Pertinent negatives as per HPI.    PAST HISTORY     Past Medical History:  Past Medical History:   Diagnosis Date    Asthma     Heart failure (HCC)     Hypercholesteremia     Hypertension     Ill-defined condition     Psoriasis        Past Surgical History:  Past Surgical History:   Procedure Laterality Date    OTHER SURGICAL HISTORY Bilateral     JAW plates    OTHER SURGICAL HISTORY Right     Ankle with screws and plates       Family History:  Family History   Problem Relation Age of Onset    Hypertension Father     Diabetes Father     High Cholesterol Father     Kidney Disease Father     Heart Disease Father        Social History:  Social History     Tobacco Use    Smoking status: Former     Current packs/day: 0.00     Types: Cigarettes     Quit date: 5/15/2007     Years since quittin.8    Smokeless tobacco: Never   Substance Use Topics    Alcohol use: No    Drug use: No       Allergies:  Allergies   Allergen Reactions    Meat  and/or available family. They convey agreement and understanding for the need to be admitted and for the admission diagnosis.    I am the Primary Clinician of Record.   Alex Gilliam MD (electronically signed)    (Please note that parts of this dictation were completed with voice recognition software. Quite often unanticipated grammatical, syntax, homophones, and other interpretive errors are inadvertently transcribed by the computer software. Please disregards these errors. Please excuse any errors that have escaped final proofreading.)          Alex Gilliam MD  03/05/25 0621

## 2025-03-05 NOTE — ED NOTES
Left for cath lab with 2 cath lab staff on monitor and pads. All belongings with patient MINUS her food which is in named and dated in ED fridge, she will get it later. Did not draw 1430 AntiXa as Heparin will be turned off during procedure.

## 2025-03-05 NOTE — ED NOTES
Called [facility] to arrange an ALS transport to [facility].  Spoke with [place] and went over diagnosis and equipment (Heparin gtt, and CM.)  ETA of on scene given. ED aware.

## 2025-03-05 NOTE — PROGRESS NOTES
Orders received, chart reviewed. Noted start time for PT order to be 3/6/25 therefore will hold PT evaluation and f/u tomorrow.     Purvi Pena, PT, DPT

## 2025-03-05 NOTE — ED NOTES
Hospitalist and cardiology saw patient. Consent for cath signed and on chart. Patient assisted to bedside commode.

## 2025-03-06 LAB
ANION GAP SERPL CALC-SCNC: 3 MMOL/L (ref 2–12)
BASOPHILS # BLD: 0.02 K/UL (ref 0–0.1)
BASOPHILS NFR BLD: 0.3 % (ref 0–1)
BUN SERPL-MCNC: 14 MG/DL (ref 6–20)
BUN/CREAT SERPL: 15 (ref 12–20)
CALCIUM SERPL-MCNC: 8.4 MG/DL (ref 8.5–10.1)
CHLORIDE SERPL-SCNC: 107 MMOL/L (ref 97–108)
CHOLEST SERPL-MCNC: 248 MG/DL
CO2 SERPL-SCNC: 26 MMOL/L (ref 21–32)
CREAT SERPL-MCNC: 0.93 MG/DL (ref 0.55–1.02)
DIFFERENTIAL METHOD BLD: ABNORMAL
EOSINOPHIL # BLD: 0.16 K/UL (ref 0–0.4)
EOSINOPHIL NFR BLD: 2.1 % (ref 0–7)
ERYTHROCYTE [DISTWIDTH] IN BLOOD BY AUTOMATED COUNT: 13.6 % (ref 11.5–14.5)
EST. AVERAGE GLUCOSE BLD GHB EST-MCNC: 114 MG/DL
GLUCOSE SERPL-MCNC: 145 MG/DL (ref 65–100)
HBA1C MFR BLD: 5.6 % (ref 4–5.6)
HCT VFR BLD AUTO: 34.5 % (ref 35–47)
HDLC SERPL-MCNC: 36 MG/DL
HDLC SERPL: 6.9 (ref 0–5)
HGB BLD-MCNC: 11.1 G/DL (ref 11.5–16)
IMM GRANULOCYTES # BLD AUTO: 0.04 K/UL (ref 0–0.04)
IMM GRANULOCYTES NFR BLD AUTO: 0.5 % (ref 0–0.5)
LDLC SERPL CALC-MCNC: ABNORMAL MG/DL (ref 0–100)
LDLC SERPL DIRECT ASSAY-MCNC: 138 MG/DL (ref 0–100)
LYMPHOCYTES # BLD: 1.75 K/UL (ref 0.8–3.5)
LYMPHOCYTES NFR BLD: 23.3 % (ref 12–49)
MAGNESIUM SERPL-MCNC: 2.4 MG/DL (ref 1.6–2.4)
MCH RBC QN AUTO: 30.2 PG (ref 26–34)
MCHC RBC AUTO-ENTMCNC: 32.2 G/DL (ref 30–36.5)
MCV RBC AUTO: 93.8 FL (ref 80–99)
MONOCYTES # BLD: 0.58 K/UL (ref 0–1)
MONOCYTES NFR BLD: 7.7 % (ref 5–13)
NEUTS SEG # BLD: 4.97 K/UL (ref 1.8–8)
NEUTS SEG NFR BLD: 66.1 % (ref 32–75)
NRBC # BLD: 0 K/UL (ref 0–0.01)
NRBC BLD-RTO: 0 PER 100 WBC
PLATELET # BLD AUTO: 317 K/UL (ref 150–400)
PMV BLD AUTO: 9.5 FL (ref 8.9–12.9)
POTASSIUM SERPL-SCNC: 3.8 MMOL/L (ref 3.5–5.1)
RBC # BLD AUTO: 3.68 M/UL (ref 3.8–5.2)
SODIUM SERPL-SCNC: 136 MMOL/L (ref 136–145)
TRIGL SERPL-MCNC: 724 MG/DL
VLDLC SERPL CALC-MCNC: ABNORMAL MG/DL
WBC # BLD AUTO: 7.5 K/UL (ref 3.6–11)

## 2025-03-06 PROCEDURE — 2500000003 HC RX 250 WO HCPCS: Performed by: STUDENT IN AN ORGANIZED HEALTH CARE EDUCATION/TRAINING PROGRAM

## 2025-03-06 PROCEDURE — 85025 COMPLETE CBC W/AUTO DIFF WBC: CPT

## 2025-03-06 PROCEDURE — 80061 LIPID PANEL: CPT

## 2025-03-06 PROCEDURE — 83735 ASSAY OF MAGNESIUM: CPT

## 2025-03-06 PROCEDURE — 6370000000 HC RX 637 (ALT 250 FOR IP): Performed by: INTERNAL MEDICINE

## 2025-03-06 PROCEDURE — 2060000000 HC ICU INTERMEDIATE R&B

## 2025-03-06 PROCEDURE — 6360000002 HC RX W HCPCS: Performed by: STUDENT IN AN ORGANIZED HEALTH CARE EDUCATION/TRAINING PROGRAM

## 2025-03-06 PROCEDURE — 36415 COLL VENOUS BLD VENIPUNCTURE: CPT

## 2025-03-06 PROCEDURE — 97161 PT EVAL LOW COMPLEX 20 MIN: CPT

## 2025-03-06 PROCEDURE — 97116 GAIT TRAINING THERAPY: CPT

## 2025-03-06 PROCEDURE — 83036 HEMOGLOBIN GLYCOSYLATED A1C: CPT

## 2025-03-06 PROCEDURE — 6370000000 HC RX 637 (ALT 250 FOR IP): Performed by: STUDENT IN AN ORGANIZED HEALTH CARE EDUCATION/TRAINING PROGRAM

## 2025-03-06 PROCEDURE — 6360000002 HC RX W HCPCS: Performed by: INTERNAL MEDICINE

## 2025-03-06 PROCEDURE — 83721 ASSAY OF BLOOD LIPOPROTEIN: CPT

## 2025-03-06 PROCEDURE — 6370000000 HC RX 637 (ALT 250 FOR IP)

## 2025-03-06 PROCEDURE — 80048 BASIC METABOLIC PNL TOTAL CA: CPT

## 2025-03-06 RX ORDER — BUPROPION HYDROCHLORIDE 150 MG/1
150 TABLET ORAL DAILY
Status: DISCONTINUED | OUTPATIENT
Start: 2025-03-07 | End: 2025-03-08 | Stop reason: HOSPADM

## 2025-03-06 RX ORDER — BUTALBITAL, ACETAMINOPHEN AND CAFFEINE 50; 325; 40 MG/1; MG/1; MG/1
1 TABLET ORAL ONCE AS NEEDED
Status: COMPLETED | OUTPATIENT
Start: 2025-03-06 | End: 2025-03-06

## 2025-03-06 RX ORDER — CARVEDILOL 6.25 MG/1
6.25 TABLET ORAL DAILY
Status: DISCONTINUED | OUTPATIENT
Start: 2025-03-07 | End: 2025-03-07

## 2025-03-06 RX ORDER — FUROSEMIDE 10 MG/ML
20 INJECTION INTRAMUSCULAR; INTRAVENOUS DAILY
Status: DISCONTINUED | OUTPATIENT
Start: 2025-03-06 | End: 2025-03-07

## 2025-03-06 RX ORDER — SACUBITRIL AND VALSARTAN 24; 26 MG/1; MG/1
0.5 TABLET, FILM COATED ORAL 2 TIMES DAILY
Status: DISCONTINUED | OUTPATIENT
Start: 2025-03-06 | End: 2025-03-08 | Stop reason: HOSPADM

## 2025-03-06 RX ORDER — CARVEDILOL 12.5 MG/1
12.5 TABLET ORAL DAILY
Status: DISCONTINUED | OUTPATIENT
Start: 2025-03-07 | End: 2025-03-06

## 2025-03-06 RX ADMIN — SACUBITRIL AND VALSARTAN 1 TABLET: 24; 26 TABLET, FILM COATED ORAL at 09:28

## 2025-03-06 RX ADMIN — TROSPIUM CHLORIDE 20 MG: 20 TABLET, FILM COATED ORAL at 05:02

## 2025-03-06 RX ADMIN — TROSPIUM CHLORIDE 20 MG: 20 TABLET, FILM COATED ORAL at 16:24

## 2025-03-06 RX ADMIN — SODIUM CHLORIDE, PRESERVATIVE FREE 10 ML: 5 INJECTION INTRAVENOUS at 21:27

## 2025-03-06 RX ADMIN — BUPROPION HYDROCHLORIDE 150 MG: 150 TABLET, EXTENDED RELEASE ORAL at 13:54

## 2025-03-06 RX ADMIN — DULOXETINE HYDROCHLORIDE 60 MG: 30 CAPSULE, DELAYED RELEASE ORAL at 09:28

## 2025-03-06 RX ADMIN — ASPIRIN 81 MG: 81 TABLET, CHEWABLE ORAL at 09:29

## 2025-03-06 RX ADMIN — ATORVASTATIN CALCIUM 40 MG: 40 TABLET, FILM COATED ORAL at 21:26

## 2025-03-06 RX ADMIN — BUTALBITAL, ACETAMINOPHEN, AND CAFFEINE 1 TABLET: 325; 50; 40 TABLET ORAL at 05:01

## 2025-03-06 RX ADMIN — FUROSEMIDE 20 MG: 10 INJECTION, SOLUTION INTRAMUSCULAR; INTRAVENOUS at 13:54

## 2025-03-06 RX ADMIN — ONDANSETRON 4 MG: 2 INJECTION, SOLUTION INTRAMUSCULAR; INTRAVENOUS at 03:43

## 2025-03-06 RX ADMIN — ACETAMINOPHEN 650 MG: 325 TABLET ORAL at 09:28

## 2025-03-06 RX ADMIN — CARVEDILOL 25 MG: 12.5 TABLET, FILM COATED ORAL at 09:28

## 2025-03-06 RX ADMIN — SACUBITRIL AND VALSARTAN 0.5 TABLET: 24; 26 TABLET, FILM COATED ORAL at 21:26

## 2025-03-06 RX ADMIN — ACETAMINOPHEN 650 MG: 325 TABLET ORAL at 21:25

## 2025-03-06 RX ADMIN — DULOXETINE HYDROCHLORIDE 60 MG: 30 CAPSULE, DELAYED RELEASE ORAL at 21:26

## 2025-03-06 RX ADMIN — CLOPIDOGREL BISULFATE 75 MG: 75 TABLET, FILM COATED ORAL at 09:28

## 2025-03-06 ASSESSMENT — PAIN DESCRIPTION - LOCATION
LOCATION: HEAD

## 2025-03-06 ASSESSMENT — PAIN SCALES - GENERAL
PAINLEVEL_OUTOF10: 4
PAINLEVEL_OUTOF10: 2
PAINLEVEL_OUTOF10: 6

## 2025-03-06 ASSESSMENT — PAIN - FUNCTIONAL ASSESSMENT
PAIN_FUNCTIONAL_ASSESSMENT: ACTIVITIES ARE NOT PREVENTED
PAIN_FUNCTIONAL_ASSESSMENT: ACTIVITIES ARE NOT PREVENTED

## 2025-03-06 ASSESSMENT — PAIN DESCRIPTION - ORIENTATION
ORIENTATION: MID
ORIENTATION: ANTERIOR;LEFT
ORIENTATION: MID

## 2025-03-06 ASSESSMENT — PAIN DESCRIPTION - DESCRIPTORS
DESCRIPTORS: ACHING

## 2025-03-06 NOTE — PROGRESS NOTES
1730: Patient arrived to [facility] from [facility]; right radial site is CDI with TR band patent at 15ccs; right femoral site is CDI. Patient states  having 3/10 chest pain. MD Kamara aware.  1845: TR band off; right radial site is CDI with quick clot and tegaderm dressing.     Predictive Model Details          18 (Normal)  Factor Value    Calculated 3/5/2025 19:07 77% Age 64 years old    Deterioration Index Model 8% Systolic 132     5% Respiratory rate 17     4% Potassium 3.6 mmol/L     4% WBC count 8.5 K/uL     1% Pulse oximetry 98 %     1% Hematocrit 35.1 %     1% Temperature 97.8 °F (36.6 °C)     1% Sodium 141 mmol/L     0% Pulse 69

## 2025-03-06 NOTE — CARE COORDINATION
Care Management Initial Assessment       RUR: 10% \"low risk\"  Readmission? No  1st IM letter given? N/A - pt has Medicaid   1st  letter given: N/A    Initial note - 1556 PM: Chart reviewed. CM met with pt at the bedside to introduce self and role. Verified contact information and demographics. Pt resides with pt mother in a one level home with 4 MARY KAY. Pt PCP is Dr. Cuba with [facility] with the last visit being within the last month. Preferred pharmacy is [facility] in [place], [place]. Pt has no hx of HH services or a SNF stay. Pt is independent with ADL's and has no DME needs. Pt is an active . Pt has no ACP on file; pt is a FULL code. Pt family to transport pt home upon time of d/c. Full assessment below:     03/06/25 8738   Service Assessment   Patient Orientation Alert and Oriented;Person;Place;Situation;Self   Cognition Alert   History Provided By Patient   Primary Caregiver Self   Support Systems Parent   Patient's Healthcare Decision Maker is: Legal Next of Kin   PCP Verified by CM Yes  (Paul Cuba with [facility])   Last Visit to PCP Within last 3 months   Prior Functional Level Independent in ADLs/IADLs   Current Functional Level Independent in ADLs/IADLs   Can patient return to prior living arrangement Yes   Ability to make needs known: Good   Family able to assist with home care needs: Yes   Would you like for me to discuss the discharge plan with any other family members/significant others, and if so, who? No   Financial Resources Medicaid  ([facility] Medicaid)   Community Resources None   Social/Functional History   Lives With Parent   Type of Home House   Home Layout One level   Home Access Stairs to enter with rails   Entrance Stairs - Number of Steps 4 MARY KAY   Home Equipment None   Receives Help From Family   Prior Level of Assist for ADLs Independent   Prior Level of Assist for Homemaking Independent   Ambulation Assistance Independent   Prior Level of Assist for Transfers Independent   Active  Yes

## 2025-03-06 NOTE — PROGRESS NOTES
[facility]  [address] [place] [address]  [phone]  [email or web address]  CARDIOLOGY PROGRESS NOTE    3/6/2025 12:13 PM    Admit Date: 3/5/2025    Admit Diagnosis:   Acute coronary syndrome (HCC) [I24.9]  NSTEMI (non-ST elevated myocardial infarction) (HCC) [I21.4]  Pulmonary edema with congestive heart failure (HCC) [I50.1]  Non-ST elevation MI (NSTEMI) (HCC) [I21.4]  Acute congestive heart failure, unspecified heart failure type (HCC) [I50.9]  Acute hypoxic respiratory failure (HCC) [J96.01]    Subjective:     Jennifer Hall was seen and examined at the bedside.  The patient feels swollen in her hands.  Had to use 4 pillows under her head last night. Weight is up.  Chest pain has solved.  No pain over the right groin or right wrist.        3/6/2025     3:50 AM 3/5/2025     9:34 AM 3/5/2025     9:14 AM 3/4/2025     5:53 PM 10/7/2024     8:47 AM 12/20/2022     9:41 AM 12/12/2022    10:06 AM   Weight Metrics   Weight 188 lb 0.8 oz 185 lb 185 lb 185 lb 182 lb 181 lb 3.2 oz 186 lb   BMI (Calculated) 30.4 kg/m2 29.9 kg/m2 29.9 kg/m2 29.9 kg/m2 29.4 kg/m2 29.3 kg/m2 30.1 kg/m2         BP (!) 104/51   Pulse 72   Temp 97.6 °F (36.4 °C) (Oral)   Resp 17   Ht 1.676 m (5' 5.98\")   Wt 85.3 kg (188 lb 0.8 oz)   SpO2 94%   BMI 30.37 kg/m²     Current Facility-Administered Medications   Medication Dose Route Frequency    sodium chloride flush 0.9 % injection 5-40 mL  5-40 mL IntraVENous 2 times per day    sodium chloride flush 0.9 % injection 5-40 mL  5-40 mL IntraVENous PRN    0.9 % sodium chloride infusion   IntraVENous PRN    ondansetron (ZOFRAN-ODT) disintegrating tablet 4 mg  4 mg Oral Q8H PRN    Or    ondansetron (ZOFRAN) injection 4 mg  4 mg IntraVENous Q6H PRN    polyethylene glycol (GLYCOLAX) packet 17 g  17 g Oral Daily    bisacodyl (DULCOLAX) suppository 10 mg  10 mg Rectal Daily PRN    acetaminophen (TYLENOL) tablet 650 mg  650 mg Oral Q6H PRN     Or    acetaminophen (TYLENOL) suppository 650 mg  650 mg Rectal Q6H PRN    hydrALAZINE (APRESOLINE) injection 20 mg  20 mg IntraVENous Q6H PRN    melatonin tablet 6 mg  6 mg Oral Nightly PRN    aspirin chewable tablet 81 mg  81 mg Oral Daily    atorvastatin (LIPITOR) tablet 40 mg  40 mg Oral Nightly    carvedilol (COREG) tablet 25 mg  25 mg Oral Daily    sacubitril-valsartan (ENTRESTO) 24-26 MG per tablet 1 tablet  1 tablet Oral BID    buPROPion (WELLBUTRIN SR) extended release tablet 150 mg  150 mg Oral Daily    DULoxetine (CYMBALTA) extended release capsule 60 mg  60 mg Oral BID    trospium (SANCTURA) tablet 20 mg  20 mg Oral BID AC    sodium chloride flush 0.9 % injection 5-40 mL  5-40 mL IntraVENous 2 times per day    sodium chloride flush 0.9 % injection 5-40 mL  5-40 mL IntraVENous PRN    0.9 % sodium chloride infusion   IntraVENous PRN    acetaminophen (TYLENOL) tablet 650 mg  650 mg Oral Q4H PRN    clopidogrel (PLAVIX) tablet 75 mg  75 mg Oral Daily         Objective:      Physical Exam  Physical Exam  Constitutional:       General: She is not in acute distress.  HENT:      Head: Normocephalic and atraumatic.   Neck:      Vascular: No JVD.   Cardiovascular:      Rate and Rhythm: Normal rate and regular rhythm.   Pulmonary:      Effort: Pulmonary effort is normal.   Musculoskeletal:      Cervical back: Neck supple.      Right lower le+ Pitting Edema present.      Left lower le+ Pitting Edema present.   Skin:     General: Skin is warm and dry.   Neurological:      Mental Status: She is alert.        Hands are swollen bilaterally  No pain or swelling over the right wrist or right groin.  Right radial pulse is 2+    Data Review:   Recent Labs     25  0309 25  0640 25  0351   WBC 8.7 8.5 7.5   HGB 12.1 11.7 11.1*   HCT 35.5 35.1 34.5*    341 317     Recent Labs     25  1809 25  0309 25  0640 25  0351     --   --  136   K 3.6  --   --  3.8     --

## 2025-03-06 NOTE — PROGRESS NOTES
Hospitalist Progress Note    NAME:   Jennifer Hall   : 1960   MRN: [id number]     Date: 3/6/2025    Patient PCP: No primary care provider on file.    Hospital Problem list:     Non-ST elevation myocardial infarction POA HS troponin 19 --> 2697  CAD s/p  Acute on chronic systolic congestive heart failure POA LVEF 30-35% in past  Recent influenza infection several weeks ago  Essential hypertension  Hyperlipidemia Lipitor recently held due to elevated LFT  Baseline known congestive heart failure, LVEF 30 to 35% at worst              Last echo at [facility] 2024 LVEF 40 to 45%              Currently on GDMT with Coreg and Entresto and Lasix              Follows with [facility]  Reports heart catheterization years ago at onset of CHF at [place]              I could not find the report  Recent abnormal LFTs, holding Lipitor prior to admission  Influenza upper respiratory infection several weeks ago          Blood pressure running low so Entresto, Coreg, Lasix were held  Presents now with 2 to 3 weeks with severe shortness of breath              Especially severe walking, can barely talk after walking a short distance              4 pillow orthopnea              No lower extremity edema  LEAVITT and severe fatigue precedes the flu illness, but became worse afterwards  Went to [facility] ED because of the worsening shortness of breath              RR 26,               Now on 2 L nasal cannula              proBNP               HS troponin 19 --> 2697  ASA, Nitropaste, IV Lasix, IV heparin drip  Transferred to [facility]  Seen by Dr. Kamara from [facility]  Cardiac catheterization 3/5/2025 with Dr. Kamara Diagnostic  Dominance: Right  Left Main   The vessel is angiographically normal.      Left Anterior Descending   The vessel exhibits minimal luminal irregularities.      Left Circumflex   The vessel is angiographically normal.      Right Coronary Artery   Mid RCA lesion,  MD

## 2025-03-06 NOTE — CARDIO/PULMONARY
Chart reviewed: Patient is 64 y.o. female admitted with Acute coronary syndrome (HCC) [I24.9]  NSTEMI (non-ST elevated myocardial infarction) (HCC) [I21.4]  Pulmonary edema with congestive heart failure (HCC) [I50.1]  Non-ST elevation MI (NSTEMI) (HCC) [I21.4]  Acute congestive heart failure, unspecified heart failure type (HCC) [I50.9]  Acute hypoxic respiratory failure (HCC) [J96.01]    Education: MI education folder, with catheterization brochure, to bedside of Jennifer Hall.                                                Educated using teach back method. Reviewed MI diagnosis definition and purpose of intervention.  Discussed risk factors for CAD to include the following: family history, elevated BMI, hyperlipidemia, hypertension, diabetes, stress, and smoking. Smoking Cessation Program link added to AVS. Discussed Heart Healthy/Low Sodium (2000 mg) diet. Reviewed the importance of medication compliance and potential side effects. Discussed follow up appointments with cardiologist, signs and symptoms of angina, and what to report to physician after discharge.  Emphasized the value of cardiac rehab. Discussed Cardiac Rehab Program format, benefits, and encouraged enrollment to assist with risk modification and management.     Patient lives in [place] and plans to attend program closer to home again.

## 2025-03-06 NOTE — PROGRESS NOTES
PHYSICAL THERAPY EVALUATION/DISCHARGE    Patient: Jennifer Hall (64 y.o. female)  Date: 3/6/2025  Primary Diagnosis: Acute coronary syndrome (HCC) [I24.9]  NSTEMI (non-ST elevated myocardial infarction) (HCC) [I21.4]  Pulmonary edema with congestive heart failure (HCC) [I50.1]  Non-ST elevation MI (NSTEMI) (HCC) [I21.4]  Acute congestive heart failure, unspecified heart failure type (HCC) [I50.9]  Acute hypoxic respiratory failure (HCC) [J96.01]  Procedure(s) (LRB):  Left heart cath / coronary angiography (N/A)  Ultrasound guided vascular access (N/A)  Insert stent yehuda coronary (N/A) 1 Day Post-Op   Precautions:              ASSESSMENT AND RECOMMENDATIONS:  Based on the objective data below, the patient is at functional baseline. Pt seen for PT evaluation following transfer from [facility] for NSTEMI, no s/p LHC. Pt encountered semi-reclined in bed in NAD, cleared by RN and agreeable to participate in therapy. Demos anti-gravity strength and performs bed mobility/transfers Mod I. Min postural sway noted during ambulation with turn negotiation but no LOB. Pt negotiated steps and ambulated in hallway without AD, S progressed to Mod I. Pt left seated in bedside chair in NAD, all needs met, VSS.     Functional Outcome Measure:  The patient scored 22/24 on the [facility] outcome measure which is indicative of lower odds of discharging home with home health or need of SNF/IPR.          Further skilled acute physical therapy is not indicated at this time.       PLAN :  Recommendation for discharge: (in order for the patient to meet his/her long term goals):   Outpatient physical therapy for cardiac rehab    Other factors to consider for discharge: no additional factors    IF patient discharges home will need the following DME: none       SUBJECTIVE:   Patient stated “I'm familiar with therapy.”    OBJECTIVE DATA SUMMARY:     Past Medical History:   Diagnosis Date    Asthma     Heart failure (HCC)     Hypercholesteremia

## 2025-03-06 NOTE — PLAN OF CARE
End of Shift Note    Bedside shift change report given to Natalia TALLEY     (oncoming nurse) by Angela Pereira RN (offgoing nurse).  Report included the following information Nurse Handoff Report    Shift worked:  Night shift [id number]     Shift summary and any significant changes:     No acute changes overnight. Patient sat up and ambulated to bathroom. No complaints of pain. Site looks good.    3/6/25 12:55 AM   [phone] Hospital or Facility: [facility] From: Angela Pereira RE: MICHELLE RUIZ 1960 RM: [id number] Hey! This patient has been complaining of a headache. I gave tylenol earlier and she said that it didn't touch it. Can we maybe get something else on board? Need Callback: NO CALLBACK REQ CCU FYI  Read 1:12 AM   3/6/25 1:12 AM  Any Neuro deficit ? Anything she takes at home for it?  3/6/25 2:39 AM   No - she said she doesn't get bad headaches like this often. Sounds like she was awake for a while between getting transferred here and having the procedure. She thinks she may be overly tired  Read 2:57 AM   3/6/25 2:57 AM  Ok maybe she can sleep and we can reeval if persistent  3/6/25 3:33 AM   Hey! I just woke her up and she said that it is not any better  Read 4:02 AM   3/6/25 4:06 AM   Also she is super swollen, she reports taking lasix and it looks like she had some yesterday @ 0830, but I don't see it in her MAR for today    3/6/25 4:17 AM  Unless it’s urgent/breathing concern let’s hold fluid and have day eval      Concerns for physician to address: Pt reports taking diuretic, lasix, @ home. This AM aorund 0400, patient feels and looks to be swollen.      Zone phone for oncoming shift:   [phone]       Activity:  Activity: In bed  Number times ambulated in hallways past shift: 0  Number of times OOB to chair past shift: 0    Cardiac:   Cardiac Monitoring: Yes      Cardiac Rhythm: Sinus rhythm    Access:  Current line(s): PIV     Genitourinary:   Urinary status: voiding and external

## 2025-03-07 LAB
ALBUMIN SERPL-MCNC: 2.4 G/DL (ref 3.5–5)
ALBUMIN/GLOB SERPL: 0.8 (ref 1.1–2.2)
ALP SERPL-CCNC: 85 U/L (ref 45–117)
ALT SERPL-CCNC: 46 U/L (ref 12–78)
ANION GAP SERPL CALC-SCNC: 5 MMOL/L (ref 2–12)
AST SERPL-CCNC: 26 U/L (ref 15–37)
BASOPHILS # BLD: 0.02 K/UL (ref 0–0.1)
BASOPHILS NFR BLD: 0.3 % (ref 0–1)
BILIRUB SERPL-MCNC: 1 MG/DL (ref 0.2–1)
BUN SERPL-MCNC: 13 MG/DL (ref 6–20)
BUN/CREAT SERPL: 15 (ref 12–20)
CALCIUM SERPL-MCNC: 7.1 MG/DL (ref 8.5–10.1)
CHLORIDE SERPL-SCNC: 110 MMOL/L (ref 97–108)
CO2 SERPL-SCNC: 25 MMOL/L (ref 21–32)
CREAT SERPL-MCNC: 0.86 MG/DL (ref 0.55–1.02)
DIFFERENTIAL METHOD BLD: ABNORMAL
EKG ATRIAL RATE: 69 BPM
EKG ATRIAL RATE: 72 BPM
EKG DIAGNOSIS: NORMAL
EKG DIAGNOSIS: NORMAL
EKG P AXIS: 2 DEGREES
EKG P AXIS: 46 DEGREES
EKG P-R INTERVAL: 162 MS
EKG P-R INTERVAL: 200 MS
EKG Q-T INTERVAL: 466 MS
EKG Q-T INTERVAL: 490 MS
EKG QRS DURATION: 102 MS
EKG QRS DURATION: 104 MS
EKG QTC CALCULATION (BAZETT): 510 MS
EKG QTC CALCULATION (BAZETT): 525 MS
EKG R AXIS: 69 DEGREES
EKG R AXIS: 88 DEGREES
EKG T AXIS: 158 DEGREES
EKG T AXIS: 158 DEGREES
EKG VENTRICULAR RATE: 69 BPM
EKG VENTRICULAR RATE: 72 BPM
EOSINOPHIL # BLD: 0.25 K/UL (ref 0–0.4)
EOSINOPHIL NFR BLD: 4.2 % (ref 0–7)
ERYTHROCYTE [DISTWIDTH] IN BLOOD BY AUTOMATED COUNT: 13.6 % (ref 11.5–14.5)
GLOBULIN SER CALC-MCNC: 3.1 G/DL (ref 2–4)
GLUCOSE SERPL-MCNC: 82 MG/DL (ref 65–100)
HCT VFR BLD AUTO: 32.8 % (ref 35–47)
HGB BLD-MCNC: 10.6 G/DL (ref 11.5–16)
IMM GRANULOCYTES # BLD AUTO: 0.02 K/UL (ref 0–0.04)
IMM GRANULOCYTES NFR BLD AUTO: 0.3 % (ref 0–0.5)
LYMPHOCYTES # BLD: 2.09 K/UL (ref 0.8–3.5)
LYMPHOCYTES NFR BLD: 35.4 % (ref 12–49)
MCH RBC QN AUTO: 29.6 PG (ref 26–34)
MCHC RBC AUTO-ENTMCNC: 32.3 G/DL (ref 30–36.5)
MCV RBC AUTO: 91.6 FL (ref 80–99)
MONOCYTES # BLD: 0.6 K/UL (ref 0–1)
MONOCYTES NFR BLD: 10.2 % (ref 5–13)
NEUTS SEG # BLD: 2.92 K/UL (ref 1.8–8)
NEUTS SEG NFR BLD: 49.6 % (ref 32–75)
NRBC # BLD: 0 K/UL (ref 0–0.01)
NRBC BLD-RTO: 0 PER 100 WBC
PLATELET # BLD AUTO: 311 K/UL (ref 150–400)
PMV BLD AUTO: 9.4 FL (ref 8.9–12.9)
POTASSIUM SERPL-SCNC: 3.5 MMOL/L (ref 3.5–5.1)
PROT SERPL-MCNC: 5.5 G/DL (ref 6.4–8.2)
RBC # BLD AUTO: 3.58 M/UL (ref 3.8–5.2)
SODIUM SERPL-SCNC: 140 MMOL/L (ref 136–145)
WBC # BLD AUTO: 5.9 K/UL (ref 3.6–11)

## 2025-03-07 PROCEDURE — 2060000000 HC ICU INTERMEDIATE R&B

## 2025-03-07 PROCEDURE — 85025 COMPLETE CBC W/AUTO DIFF WBC: CPT

## 2025-03-07 PROCEDURE — 6370000000 HC RX 637 (ALT 250 FOR IP): Performed by: INTERNAL MEDICINE

## 2025-03-07 PROCEDURE — 6370000000 HC RX 637 (ALT 250 FOR IP): Performed by: STUDENT IN AN ORGANIZED HEALTH CARE EDUCATION/TRAINING PROGRAM

## 2025-03-07 PROCEDURE — 36415 COLL VENOUS BLD VENIPUNCTURE: CPT

## 2025-03-07 PROCEDURE — 6360000002 HC RX W HCPCS: Performed by: INTERNAL MEDICINE

## 2025-03-07 PROCEDURE — 2500000003 HC RX 250 WO HCPCS: Performed by: INTERNAL MEDICINE

## 2025-03-07 PROCEDURE — 93005 ELECTROCARDIOGRAM TRACING: CPT

## 2025-03-07 PROCEDURE — 2500000003 HC RX 250 WO HCPCS: Performed by: STUDENT IN AN ORGANIZED HEALTH CARE EDUCATION/TRAINING PROGRAM

## 2025-03-07 PROCEDURE — 80053 COMPREHEN METABOLIC PANEL: CPT

## 2025-03-07 RX ORDER — FUROSEMIDE 10 MG/ML
40 INJECTION INTRAMUSCULAR; INTRAVENOUS 2 TIMES DAILY
Status: DISCONTINUED | OUTPATIENT
Start: 2025-03-07 | End: 2025-03-08

## 2025-03-07 RX ORDER — FUROSEMIDE 10 MG/ML
40 INJECTION INTRAMUSCULAR; INTRAVENOUS DAILY
Status: DISCONTINUED | OUTPATIENT
Start: 2025-03-07 | End: 2025-03-07

## 2025-03-07 RX ORDER — CARVEDILOL 12.5 MG/1
12.5 TABLET ORAL 2 TIMES DAILY
Status: DISCONTINUED | OUTPATIENT
Start: 2025-03-07 | End: 2025-03-08 | Stop reason: HOSPADM

## 2025-03-07 RX ORDER — POTASSIUM CHLORIDE 1500 MG/1
40 TABLET, EXTENDED RELEASE ORAL ONCE
Status: COMPLETED | OUTPATIENT
Start: 2025-03-07 | End: 2025-03-07

## 2025-03-07 RX ADMIN — SODIUM CHLORIDE, PRESERVATIVE FREE 10 ML: 5 INJECTION INTRAVENOUS at 09:49

## 2025-03-07 RX ADMIN — SODIUM CHLORIDE, PRESERVATIVE FREE 10 ML: 5 INJECTION INTRAVENOUS at 20:43

## 2025-03-07 RX ADMIN — ASPIRIN 81 MG: 81 TABLET, CHEWABLE ORAL at 09:50

## 2025-03-07 RX ADMIN — TROSPIUM CHLORIDE 20 MG: 20 TABLET, FILM COATED ORAL at 06:01

## 2025-03-07 RX ADMIN — BUPROPION HYDROCHLORIDE 150 MG: 150 TABLET, EXTENDED RELEASE ORAL at 09:50

## 2025-03-07 RX ADMIN — ACETAMINOPHEN 650 MG: 325 TABLET ORAL at 09:49

## 2025-03-07 RX ADMIN — CARVEDILOL 6.25 MG: 6.25 TABLET, FILM COATED ORAL at 09:50

## 2025-03-07 RX ADMIN — CLOPIDOGREL BISULFATE 75 MG: 75 TABLET, FILM COATED ORAL at 09:50

## 2025-03-07 RX ADMIN — ACETAMINOPHEN 650 MG: 325 TABLET ORAL at 21:16

## 2025-03-07 RX ADMIN — FUROSEMIDE 40 MG: 10 INJECTION, SOLUTION INTRAMUSCULAR; INTRAVENOUS at 17:16

## 2025-03-07 RX ADMIN — DULOXETINE HYDROCHLORIDE 60 MG: 30 CAPSULE, DELAYED RELEASE ORAL at 20:38

## 2025-03-07 RX ADMIN — CARVEDILOL 12.5 MG: 12.5 TABLET, FILM COATED ORAL at 20:38

## 2025-03-07 RX ADMIN — SODIUM CHLORIDE, PRESERVATIVE FREE 10 ML: 5 INJECTION INTRAVENOUS at 20:42

## 2025-03-07 RX ADMIN — TROSPIUM CHLORIDE 20 MG: 20 TABLET, FILM COATED ORAL at 17:17

## 2025-03-07 RX ADMIN — SACUBITRIL AND VALSARTAN 0.5 TABLET: 24; 26 TABLET, FILM COATED ORAL at 09:50

## 2025-03-07 RX ADMIN — ACETAMINOPHEN 650 MG: 325 TABLET ORAL at 16:51

## 2025-03-07 RX ADMIN — SACUBITRIL AND VALSARTAN 0.5 TABLET: 24; 26 TABLET, FILM COATED ORAL at 20:38

## 2025-03-07 RX ADMIN — FUROSEMIDE 40 MG: 10 INJECTION, SOLUTION INTRAMUSCULAR; INTRAVENOUS at 09:53

## 2025-03-07 RX ADMIN — ATORVASTATIN CALCIUM 40 MG: 40 TABLET, FILM COATED ORAL at 20:38

## 2025-03-07 RX ADMIN — DULOXETINE HYDROCHLORIDE 60 MG: 30 CAPSULE, DELAYED RELEASE ORAL at 09:50

## 2025-03-07 RX ADMIN — POTASSIUM CHLORIDE 40 MEQ: 1500 TABLET, EXTENDED RELEASE ORAL at 17:17

## 2025-03-07 ASSESSMENT — PAIN DESCRIPTION - ORIENTATION: ORIENTATION: LEFT

## 2025-03-07 ASSESSMENT — PAIN SCALES - GENERAL
PAINLEVEL_OUTOF10: 3
PAINLEVEL_OUTOF10: 0

## 2025-03-07 ASSESSMENT — PAIN DESCRIPTION - LOCATION
LOCATION: HEAD

## 2025-03-07 ASSESSMENT — PAIN DESCRIPTION - DESCRIPTORS
DESCRIPTORS: ACHING;DISCOMFORT
DESCRIPTORS: ACHING
DESCRIPTORS: ACHING

## 2025-03-07 ASSESSMENT — PAIN - FUNCTIONAL ASSESSMENT: PAIN_FUNCTIONAL_ASSESSMENT: ACTIVITIES ARE NOT PREVENTED

## 2025-03-07 ASSESSMENT — PAIN SCALES - WONG BAKER: WONGBAKER_NUMERICALRESPONSE: NO HURT

## 2025-03-07 NOTE — DISCHARGE INSTRUCTIONS
You have been given a copy of the American Heart Association's Heart Failure Educational Booklet.  Please read over this booklet and take it with you to your next physician appointment with any questions you may have.     For additional resources and to access the American Heart Association's Interactive Workbook \"Healthier Living with Heart Failure - Managing Symptoms and Reducing Risk,\" please scan the QR code below.               Download the Heart Failure [place]: Search in your Google Play Store (vogogo) or [employer] Store (EverTune): Search for- HF [place].    https://www.heart.org/en/health-topics/heart-failure/heart-failure-tools-resources/cw-moukjy-utm    HF [place] is a brand-new phone park that helps you track daily symptoms, vitals, mood, energy level and more. You can even add your heart failure care team members to view your data and monitor your condition at home.    HF [place] Lets You:  Track symptoms, medications and more  Share health information with your health care team  Connect with others living with heart failure

## 2025-03-07 NOTE — PROGRESS NOTES
[facility]  [address] [place] [address]  [phone]  [email or web address]  CARDIOLOGY PROGRESS NOTE    3/7/2025 12:46 PM    Admit Date: 3/5/2025    Admit Diagnosis:   Acute coronary syndrome (HCC) [I24.9]  NSTEMI (non-ST elevated myocardial infarction) (HCC) [I21.4]  Pulmonary edema with congestive heart failure (HCC) [I50.1]  Non-ST elevation MI (NSTEMI) (HCC) [I21.4]  Acute congestive heart failure, unspecified heart failure type (HCC) [I50.9]  Acute hypoxic respiratory failure (HCC) [J96.01]    Subjective:     Jennifer Hall was seen and examined at the bedside.  Feels less swollen.  No more chest pain.  No right groin or right wrist pain.    BP (!) 148/68   Pulse 82   Temp 97.7 °F (36.5 °C) (Oral)   Resp 19   Ht 1.676 m (5' 5.98\")   Wt 84.8 kg (186 lb 15.2 oz)   SpO2 97%   BMI 30.19 kg/m²     Current Facility-Administered Medications   Medication Dose Route Frequency    influenza split vaccine (PF) (AFLURIA;FLUARIX) injection 0.5 mL  1 Dose IntraMUSCular Prior to discharge    furosemide (LASIX) injection 40 mg  40 mg IntraVENous Daily    carvedilol (COREG) tablet 12.5 mg  12.5 mg Oral BID    potassium chloride (KLOR-CON M) extended release tablet 40 mEq  40 mEq Oral Once    sacubitril-valsartan (ENTRESTO) 24-26 MG per tablet 0.5 tablet  0.5 tablet Oral BID    buPROPion (WELLBUTRIN XL) extended release tablet 150 mg  150 mg Oral Daily    sodium chloride flush 0.9 % injection 5-40 mL  5-40 mL IntraVENous 2 times per day    sodium chloride flush 0.9 % injection 5-40 mL  5-40 mL IntraVENous PRN    0.9 % sodium chloride infusion   IntraVENous PRN    ondansetron (ZOFRAN-ODT) disintegrating tablet 4 mg  4 mg Oral Q8H PRN    Or    ondansetron (ZOFRAN) injection 4 mg  4 mg IntraVENous Q6H PRN    polyethylene glycol (GLYCOLAX) packet 17 g  17 g Oral Daily    bisacodyl (DULCOLAX) suppository 10 mg  10 mg Rectal Daily PRN    acetaminophen (TYLENOL) tablet    Telemetry: Sinus rhythm with premature ventricular complexes  Assessment:   64-year-old female with overlapping influenza infection and heart failure symptoms    Acute on chronic systolic heart failure: LVEF 35% on echo this admission.  Continue diuretic therapy for another day.  Blood pressure somewhat improved.  Continue carvedilol.    Status post percutaneous coronary intervention: Aspirin and clopidogrel  Anemia: Stool occult blood  Recent influenza illness  History of tick bites, myalgia, rash and joint pain  History of palpitations  Dyslipidemia  Psoriasis  Transient ischemic attack history    Thank you for allowing us to participate in the care of this patient.  We will follow.    Sharon Kamara MD

## 2025-03-07 NOTE — CARE COORDINATION
Transition of Care Plan:    RUR: 11% \"low risk\"  Prior Level of Functioning: Independent with ADL's  Disposition: Home with mother   PRETTY: 3/8  If SNF or IPR: Date FOC offered: N/A  Follow up appointments: PCP/Specialists as indicated  DME needed: None   Transportation at discharge: Family to transport   IM/IMM Medicare/ letter given: N/A - pt has Medicaid   Is patient a  and connected with VA? No   Caregiver Contact: Kd Hall (parent), [phone]  Discharge Caregiver contacted prior to discharge? To be contacted  Care Conference needed? Not at this time   Barriers to discharge: Cardiology    1353 PM: Chart reviewed. CM following for d/c planning. Pt to return home independently upon medical clearance. CM to continue to follow should CM needs arise.    HOLLIE Pop  Care Management  [facility]   [id number]

## 2025-03-07 NOTE — PLAN OF CARE
Predictive Model Details          20 (Normal)  Factor Value    Calculated 3/6/2025 23:42 59% Age 64 years old    Deterioration Index Model 22% Respiratory rate 14     9% Systolic 142     6% Sodium 136 mmol/L     1% Potassium 3.8 mmol/L     1% WBC count 7.5 K/uL     1% Hematocrit abnormal (34.5 %)     1% Pulse oximetry 97 %     0% Temperature 98.7 °F (37.1 °C)     0% Pulse 70    End of Shift Note    Bedside shift change report given to am rn (oncoming nurse) by JOB STERN RN (offgoing nurse).  Report included the following information SBAR, Kardex, Intake/Output, MAR, Recent Results, and Cardiac Rhythm SR    Shift worked:  7p-7a     Shift summary and any significant changes:     uneventful     Concerns for physician to address:  [place]     [employer] phone for oncoming shift:   [phone]       Activity:  Level of Assistance: Independent  Number times ambulated in hallways past shift: 1  Number of times OOB to chair past shift: 1    Cardiac:   Cardiac Monitoring: Yes      Cardiac Rhythm: Sinus rhythm    Access:  Current line(s): PIV     Genitourinary:   Urinary Status: Voiding    Respiratory:   O2 Device: None (Room air)  Chronic home O2 use?: NO  Incentive spirometer at bedside: YES    GI:  Last BM (including prior to admit): 03/03/25 (Pt stated)  Current diet:  ADULT DIET; Regular; 4 carb choices (60 gm/meal); Low Fat/Low Chol/High Fiber/2 gm Na  DIET ONE TIME MESSAGE;  Passing flatus: YES    Pain Management:   Patient states pain is manageable on current regimen: YES    Skin:  Sukhwinder Scale Score: 23  Interventions: Wound Offloading (Prevention Methods): Repositioning    Patient Safety:  Fall Risk: Nursing Judgement-Fall Risk High(Add Comments): Yes  Fall Risk Interventions  Nursing Judgement-Fall Risk High(Add Comments): Yes  Toilet Every 2 Hours-In Advance of Need: Yes  Hourly Visual Checks: In bed  Fall Visual Posted: Socks  Room Door Open: Yes  Alarm On: Bed  Patient Moved Closer to Nursing Station: No    Active

## 2025-03-07 NOTE — PROGRESS NOTES
Hospitalist Progress Note    NAME:   Jennifer Hall   : 1960   MRN: [id number]     Date: 3/7/2025    Patient PCP: No primary care provider on file.    Hospital Problem list:     Non-ST elevation myocardial infarction POA HS troponin 19 --> 2697  CAD s/p RCA stent 3/5/2025 POA  Acute on chronic systolic congestive heart failure POA LVEF 30-35%  Recent influenza infection several weeks ago  Essential hypertension  Hyperlipidemia   Recent abnormal LFTs, now back to normal  Baseline known congestive heart failure, LVEF 30 to 35% at worst              Last echo at [facility] 2024 LVEF 40 to 45%              Currently on GDMT with Coreg and Entresto and Lasix              [facility]  Reports heart catheterization years ago at onset of CHF at [place]              I could not find the report  Recent abnormal LFTs, holding Lipitor prior to admission   LFTs are now normalized and Lipitor resumed  Influenza upper respiratory infection several weeks ago          Blood pressure running low so Entresto, Coreg, Lasix were held  Presents now with 2 to 3 weeks with severe shortness of breath              Especially severe walking, can barely talk after walking a short distance              4 pillow orthopnea              No lower extremity edema  LEAVITT and severe fatigue precedes the flu illness, but became worse afterwards  Went [facility] ED because of the worsening shortness of breath              RR 26,               Now on 2 L nasal cannula              proBNP               HS troponin 19 --> 2697  ASA, Nitropaste, IV Lasix, IV heparin drip  Transferred to [facility]  Seen by Dr. Kamara from [facility]  Cardiac catheterization 3/5/2025 with Dr. Kamara Diagnostic  Dominance: Right  Left Main   The vessel is angiographically normal.      Left Anterior Descending   The vessel exhibits minimal luminal irregularities.      Left Circumflex   The vessel is

## 2025-03-07 NOTE — PROGRESS NOTES
End of Shift Note    Bedside shift change report given to GERRI Senior (oncoming nurse) by Natalia Mccormick RN (offgoing nurse).  Report included the following information SBAR    Shift worked:  7A-7P     Shift summary and any significant changes:     Lasix for swelling, patient independent with IS, up walking in the room. PRN meds given for headache.      Concerns for physician to address:  D/C in am     [employer] phone for oncoming shift:   [phone]       Activity:  Level of Assistance: Independent  Number times ambulated in hallways past shift: 1  Number of times OOB to chair past shift: 3    Cardiac:   Cardiac Monitoring: Yes      Cardiac Rhythm: Sinus rhythm    Access:  Current line(s): PIV     Genitourinary:   Urinary Status: Voiding, External catheter    Respiratory:   O2 Device: None (Room air)  Chronic home O2 use?: NO  Incentive spirometer at bedside: YES    GI:  Last BM (including prior to admit): 03/03/25 (Pt stated)  Current diet:  ADULT DIET; Regular; 4 carb choices (60 gm/meal); Low Fat/Low Chol/High Fiber/2 gm Na  DIET ONE TIME MESSAGE;  Passing flatus: YES    Pain Management:   Patient states pain is manageable on current regimen: YES    Skin:  Sukhwinder Scale Score: 23  Interventions: Wound Offloading (Prevention Methods): Repositioning    Patient Safety:  Fall Risk: Nursing Judgement-Fall Risk High(Add Comments): No  Fall Risk Interventions  Nursing Judgement-Fall Risk High(Add Comments): No  Toilet Every 2 Hours-In Advance of Need: Yes  Hourly Visual Checks: Awake, In bed  Fall Visual Posted: Socks  Room Door Open: Yes  Alarm On: Bed  Patient Moved Closer to Nursing Station: Yes    Active Consults:   IP CONSULT TO CARDIOLOGY  IP CONSULT TO CARDIAC REHAB  IP CONSULT TO CARDIAC REHAB    Length of Stay:  Expected LOS: 3  Actual LOS: 1    Natalia Mccormick RN

## 2025-03-08 VITALS
RESPIRATION RATE: 19 BRPM | DIASTOLIC BLOOD PRESSURE: 58 MMHG | TEMPERATURE: 98 F | HEART RATE: 68 BPM | WEIGHT: 186.95 LBS | SYSTOLIC BLOOD PRESSURE: 111 MMHG | OXYGEN SATURATION: 97 % | HEIGHT: 66 IN | BODY MASS INDEX: 30.05 KG/M2

## 2025-03-08 LAB
ANION GAP SERPL CALC-SCNC: 7 MMOL/L (ref 2–12)
BASOPHILS # BLD: 0.02 K/UL (ref 0–0.1)
BASOPHILS NFR BLD: 0.3 % (ref 0–1)
BUN SERPL-MCNC: 17 MG/DL (ref 6–20)
BUN/CREAT SERPL: 16 (ref 12–20)
CALCIUM SERPL-MCNC: 9.1 MG/DL (ref 8.5–10.1)
CHLORIDE SERPL-SCNC: 102 MMOL/L (ref 97–108)
CO2 SERPL-SCNC: 29 MMOL/L (ref 21–32)
CREAT SERPL-MCNC: 1.09 MG/DL (ref 0.55–1.02)
DIFFERENTIAL METHOD BLD: ABNORMAL
EOSINOPHIL # BLD: 0.26 K/UL (ref 0–0.4)
EOSINOPHIL NFR BLD: 3.7 % (ref 0–7)
ERYTHROCYTE [DISTWIDTH] IN BLOOD BY AUTOMATED COUNT: 13.9 % (ref 11.5–14.5)
FERRITIN SERPL-MCNC: 322 NG/ML (ref 8–252)
GLUCOSE SERPL-MCNC: 104 MG/DL (ref 65–100)
HCT VFR BLD AUTO: 35.4 % (ref 35–47)
HGB BLD-MCNC: 11.4 G/DL (ref 11.5–16)
IMM GRANULOCYTES # BLD AUTO: 0.03 K/UL (ref 0–0.04)
IMM GRANULOCYTES NFR BLD AUTO: 0.4 % (ref 0–0.5)
IRON SATN MFR SERPL: 21 % (ref 20–50)
IRON SERPL-MCNC: 68 UG/DL (ref 35–150)
LYMPHOCYTES # BLD: 2.09 K/UL (ref 0.8–3.5)
LYMPHOCYTES NFR BLD: 29.5 % (ref 12–49)
MAGNESIUM SERPL-MCNC: 2.3 MG/DL (ref 1.6–2.4)
MCH RBC QN AUTO: 29.5 PG (ref 26–34)
MCHC RBC AUTO-ENTMCNC: 32.2 G/DL (ref 30–36.5)
MCV RBC AUTO: 91.5 FL (ref 80–99)
MONOCYTES # BLD: 0.66 K/UL (ref 0–1)
MONOCYTES NFR BLD: 9.3 % (ref 5–13)
NEUTS SEG # BLD: 4.02 K/UL (ref 1.8–8)
NEUTS SEG NFR BLD: 56.8 % (ref 32–75)
NRBC # BLD: 0 K/UL (ref 0–0.01)
NRBC BLD-RTO: 0 PER 100 WBC
PLATELET # BLD AUTO: 334 K/UL (ref 150–400)
PMV BLD AUTO: 9.5 FL (ref 8.9–12.9)
POTASSIUM SERPL-SCNC: 4.1 MMOL/L (ref 3.5–5.1)
RBC # BLD AUTO: 3.87 M/UL (ref 3.8–5.2)
SODIUM SERPL-SCNC: 138 MMOL/L (ref 136–145)
TIBC SERPL-MCNC: 327 UG/DL (ref 250–450)
WBC # BLD AUTO: 7.1 K/UL (ref 3.6–11)

## 2025-03-08 PROCEDURE — 82728 ASSAY OF FERRITIN: CPT

## 2025-03-08 PROCEDURE — 36415 COLL VENOUS BLD VENIPUNCTURE: CPT

## 2025-03-08 PROCEDURE — 6360000002 HC RX W HCPCS: Performed by: INTERNAL MEDICINE

## 2025-03-08 PROCEDURE — 2500000003 HC RX 250 WO HCPCS: Performed by: INTERNAL MEDICINE

## 2025-03-08 PROCEDURE — 83540 ASSAY OF IRON: CPT

## 2025-03-08 PROCEDURE — 85025 COMPLETE CBC W/AUTO DIFF WBC: CPT

## 2025-03-08 PROCEDURE — 6370000000 HC RX 637 (ALT 250 FOR IP): Performed by: INTERNAL MEDICINE

## 2025-03-08 PROCEDURE — 83550 IRON BINDING TEST: CPT

## 2025-03-08 PROCEDURE — 83735 ASSAY OF MAGNESIUM: CPT

## 2025-03-08 PROCEDURE — 2500000003 HC RX 250 WO HCPCS: Performed by: STUDENT IN AN ORGANIZED HEALTH CARE EDUCATION/TRAINING PROGRAM

## 2025-03-08 PROCEDURE — 6370000000 HC RX 637 (ALT 250 FOR IP): Performed by: STUDENT IN AN ORGANIZED HEALTH CARE EDUCATION/TRAINING PROGRAM

## 2025-03-08 PROCEDURE — 80048 BASIC METABOLIC PNL TOTAL CA: CPT

## 2025-03-08 RX ORDER — SACUBITRIL AND VALSARTAN 24; 26 MG/1; MG/1
0.5 TABLET, FILM COATED ORAL 2 TIMES DAILY
Qty: 90 TABLET | Refills: 3 | Status: SHIPPED | OUTPATIENT
Start: 2025-03-08

## 2025-03-08 RX ORDER — CLOPIDOGREL BISULFATE 75 MG/1
75 TABLET ORAL DAILY
Qty: 90 TABLET | Refills: 3 | Status: SHIPPED | OUTPATIENT
Start: 2025-03-09

## 2025-03-08 RX ORDER — FUROSEMIDE 40 MG/1
40 TABLET ORAL DAILY
Status: DISCONTINUED | OUTPATIENT
Start: 2025-03-09 | End: 2025-03-08 | Stop reason: HOSPADM

## 2025-03-08 RX ORDER — FUROSEMIDE 40 MG/1
40 TABLET ORAL 2 TIMES DAILY
Status: DISCONTINUED | OUTPATIENT
Start: 2025-03-08 | End: 2025-03-08

## 2025-03-08 RX ORDER — ASPIRIN 81 MG/1
81 TABLET, CHEWABLE ORAL DAILY
Qty: 90 TABLET | Refills: 3 | Status: SHIPPED | OUTPATIENT
Start: 2025-03-08

## 2025-03-08 RX ORDER — ATORVASTATIN CALCIUM 40 MG/1
40 TABLET, FILM COATED ORAL NIGHTLY
Qty: 90 TABLET | Refills: 3 | Status: SHIPPED | OUTPATIENT
Start: 2025-03-08

## 2025-03-08 RX ORDER — FUROSEMIDE 40 MG/1
40 TABLET ORAL DAILY
Qty: 90 TABLET | Refills: 3 | Status: SHIPPED | OUTPATIENT
Start: 2025-03-08

## 2025-03-08 RX ORDER — CARVEDILOL 12.5 MG/1
12.5 TABLET ORAL 2 TIMES DAILY
Qty: 180 TABLET | Refills: 3 | Status: SHIPPED | OUTPATIENT
Start: 2025-03-08

## 2025-03-08 RX ADMIN — BUPROPION HYDROCHLORIDE 150 MG: 150 TABLET, EXTENDED RELEASE ORAL at 09:55

## 2025-03-08 RX ADMIN — ACETAMINOPHEN 650 MG: 325 TABLET ORAL at 02:07

## 2025-03-08 RX ADMIN — CARVEDILOL 12.5 MG: 12.5 TABLET, FILM COATED ORAL at 09:54

## 2025-03-08 RX ADMIN — CLOPIDOGREL BISULFATE 75 MG: 75 TABLET, FILM COATED ORAL at 09:55

## 2025-03-08 RX ADMIN — ACETAMINOPHEN 650 MG: 325 TABLET ORAL at 15:53

## 2025-03-08 RX ADMIN — TROSPIUM CHLORIDE 20 MG: 20 TABLET, FILM COATED ORAL at 15:53

## 2025-03-08 RX ADMIN — ACETAMINOPHEN 650 MG: 325 TABLET ORAL at 09:55

## 2025-03-08 RX ADMIN — ASPIRIN 81 MG: 81 TABLET, CHEWABLE ORAL at 09:55

## 2025-03-08 RX ADMIN — TROSPIUM CHLORIDE 20 MG: 20 TABLET, FILM COATED ORAL at 09:55

## 2025-03-08 RX ADMIN — SODIUM CHLORIDE, PRESERVATIVE FREE 10 ML: 5 INJECTION INTRAVENOUS at 10:00

## 2025-03-08 RX ADMIN — DULOXETINE HYDROCHLORIDE 60 MG: 30 CAPSULE, DELAYED RELEASE ORAL at 09:54

## 2025-03-08 RX ADMIN — SODIUM CHLORIDE, PRESERVATIVE FREE 10 ML: 5 INJECTION INTRAVENOUS at 10:01

## 2025-03-08 RX ADMIN — SACUBITRIL AND VALSARTAN 0.5 TABLET: 24; 26 TABLET, FILM COATED ORAL at 09:54

## 2025-03-08 RX ADMIN — FUROSEMIDE 40 MG: 10 INJECTION, SOLUTION INTRAMUSCULAR; INTRAVENOUS at 09:55

## 2025-03-08 ASSESSMENT — PAIN DESCRIPTION - ORIENTATION: ORIENTATION: MID

## 2025-03-08 ASSESSMENT — PAIN DESCRIPTION - DESCRIPTORS
DESCRIPTORS: ACHING
DESCRIPTORS: ACHING

## 2025-03-08 ASSESSMENT — PAIN SCALES - GENERAL
PAINLEVEL_OUTOF10: 3
PAINLEVEL_OUTOF10: 0
PAINLEVEL_OUTOF10: 3

## 2025-03-08 ASSESSMENT — PAIN SCALES - WONG BAKER: WONGBAKER_NUMERICALRESPONSE: NO HURT

## 2025-03-08 ASSESSMENT — PAIN - FUNCTIONAL ASSESSMENT: PAIN_FUNCTIONAL_ASSESSMENT: ACTIVITIES ARE NOT PREVENTED

## 2025-03-08 ASSESSMENT — PAIN DESCRIPTION - LOCATION
LOCATION: HEAD
LOCATION: HEAD

## 2025-03-08 NOTE — PROGRESS NOTES
Hospitalist Progress Note    NAME:   Jennifer Hall   : 1960   MRN: [id number]     Admit date: 3/5/2025    Discharge date: 25    PCP: No primary care provider on file.    Discharge Diagnoses:    Discharge Medications:  Current Discharge Medication List        START taking these medications    Details   clopidogrel (PLAVIX) 75 MG tablet Take 1 tablet by mouth daily  Qty: 90 tablet, Refills: 3           CONTINUE these medications which have CHANGED    Details   atorvastatin (LIPITOR) 40 MG tablet Take 1 tablet by mouth nightly  Qty: 90 tablet, Refills: 3      carvedilol (COREG) 12.5 MG tablet Take 1 tablet by mouth 2 times daily  Qty: 180 tablet, Refills: 3      sacubitril-valsartan (ENTRESTO) 24-26 MG per tablet Take 0.5 tablets by mouth 2 times daily  Qty: 90 tablet, Refills: 3      furosemide (LASIX) 40 MG tablet Take 1 tablet by mouth daily  Qty: 90 tablet, Refills: 3      aspirin 81 MG chewable tablet Take 1 tablet by mouth daily  Qty: 90 tablet, Refills: 3           CONTINUE these medications which have NOT CHANGED    Details   buPROPion (WELLBUTRIN SR) 150 MG extended release tablet TAKE 1 TABLET BY MOUTH TWICE DAILY DO NOT CRUSH, CHEW OR SPLIT      cetirizine (ZYRTEC) 10 MG tablet Take 1 tablet by mouth daily      Cholecalciferol 50 MCG ( UT) TABS Take 1 tablet by mouth daily      clobetasol (TEMOVATE) 0.05 % external solution Apply topically 2 times daily      DULoxetine (CYMBALTA) 60 MG extended release capsule Take 1 capsule by mouth 2 times daily      Ferrous Sulfate 324 MG TBEC Take 130 mg by mouth every morning (before breakfast)      fesoterodine (TOVIAZ) 4 MG TB24 ER tablet TAKE 1 TAB BY MOUTH ONCE DAILY           STOP taking these medications       naproxen (NAPROSYN) 500 MG tablet Comments:   Reason for Stopping:         butalbital-acetaminophen-caffeine (FIORICET, ESGIC) -40 MG per tablet Comments:   Reason for Stopping:         ibuprofen (ADVIL;MOTRIN) 600 MG tablet  Comments:   Reason for Stopping:         mirabegron (MYRBETRIQ) 25 MG TB24 Comments:   Reason for Stopping:         ondansetron (ZOFRAN) 4 MG tablet Comments:   Reason for Stopping:         solifenacin (VESICARE) 5 MG tablet Comments:   Reason for Stopping:               Follow-up Information       Follow up With Specialties Details Why Contact Info    Sharon Kamara MD Cardiovascular Disease, Interventional Cardiology Schedule an appointment as soon as possible for a visit in 2 week(s) Office should contact you about appointment, call if you do not hear from them by this coming wednesday [address] [address] [phone]      PCP TYPE:  Follow up in 1 week(s) See your PCP or go to a walk in clinic like [facility] to get a serum chemistry checked in 1 week             Time spent on discharge:   I spent 38 minutes on discharge, seeing and examining the patient, reconciling home meds and new meds, coordinating care with case management, doing the discharge papers and the D/C summary    Discharge disposition:     Discharge Condition: Stable    Summary of admission H+P(copied from Alex Paredes Jr., MD Note):       Hospital course:        Non-ST elevation myocardial infarction POA HS troponin 19 --> 2697  CAD s/p RCA stent 3/5/2025 POA  Acute on chronic systolic congestive heart failure POA LVEF 30-35%  Recent influenza infection several weeks ago  Essential hypertension  Hyperlipidemia   Recent abnormal LFTs, now back to normal  Baseline known congestive heart failure, LVEF 30 to 35% at worst              [facility] LVEF 40 to 45%              Currently on GDMT with Coreg and Entresto and Lasix              [facility] with [facility]  Reports heart catheterization years ago at onset of CHF at [place]              I could not find the report  Recent abnormal LFTs, holding Lipitor prior to admission   LFTs are now normalized and Lipitor

## 2025-03-08 NOTE — CARE COORDINATION
Patient is clear from a CM standpoint.    1447 - [facility] contact information listed on AVS.      JOYCE CoelhoN, RN    Care Management  [phone]

## 2025-03-08 NOTE — PROGRESS NOTES
Hospitalist Progress Note    NAME:   Jennifer Hall   : 1960   MRN: [id number]     Date: 3/8/2025    Patient PCP: No primary care provider on file.    Hospital Problem list:     Non-ST elevation myocardial infarction POA HS troponin 19 --> 2697  CAD s/p RCA stent 3/5/2025 POA  Acute on chronic systolic congestive heart failure POA LVEF 30-35%  Recent influenza infection several weeks ago  Essential hypertension  Hyperlipidemia   Recent abnormal LFTs, now back to normal  Baseline known congestive heart failure, LVEF 30 to 35% at worst              Last echo at [facility] 2024 LVEF 40 to 45%              Currently on GDMT with Coreg and Entresto and Lasix              [facility]  Reports heart catheterization years ago at onset of CHF at [place]              I could not find the report  Recent abnormal LFTs, holding Lipitor prior to admission   LFTs are now normalized and Lipitor resumed  Influenza upper respiratory infection several weeks ago          Blood pressure running low so Entresto, Coreg, Lasix were held  Presents now with 2 to 3 weeks with severe shortness of breath              Especially severe walking, can barely talk after walking a short distance              4 pillow orthopnea              No lower extremity edema  LEAVITT and severe fatigue precedes the flu illness, but became worse afterwards  Went to [facility] ED because of the worsening shortness of breath              RR 26,               Now on 2 L nasal cannula              proBNP               HS troponin 19 --> 2697  ASA, Nitropaste, IV Lasix, IV heparin drip  Transferred to [facility]  Seen by Dr. Kamara from [facility]  Cardiac catheterization 3/5/2025 with Dr. Kamara Diagnostic  Dominance: Right  Left Main   The vessel is angiographically normal.      Left Anterior Descending   The vessel exhibits minimal luminal irregularities.      Left Circumflex   The vessel is  use  CV:  Regular  rhythm,  No edema  GI:  Soft, Non distended, Non tender.  +Bowel sounds  Neurologic:  Alert and oriented X 3, normal speech,   Psych:   Good insight. Not anxious nor agitated  Skin:  No rashes.  No jaundice    Reviewed most current lab test results and cultures  YES  Reviewed most current radiology test results   YES  Review and summation of old records today    NO  Reviewed patient's current orders and MAR    YES  PMH/SH reviewed - no change compared to H&P    ________________________________________________________________________        Comments   >50% of visit spent in counseling and coordination of care     ________________________________________________________________________  Alex Paredes Jr, MD     Procedures: see electronic medical records for all procedures/Xrays and details which were not copied into this note but were reviewed prior to creation of Plan.      LABS:  I reviewed today's most current labs and imaging studies.  Pertinent labs include:  Recent Labs     03/06/25  0351 03/07/25  0346 03/08/25  0200   WBC 7.5 5.9 7.1   HGB 11.1* 10.6* 11.4*   HCT 34.5* 32.8* 35.4    311 334     Recent Labs     03/06/25  0351 03/07/25  0346 03/08/25  0200    140 138   K 3.8 3.5 4.1    110* 102   CO2 26 25 29   GLUCOSE 145* 82 104*   BUN 14 13 17   CREATININE 0.93 0.86 1.09*   CALCIUM 8.4* 7.1* 9.1   MG 2.4  --  2.3   BILITOT  --  1.0  --    AST  --  26  --    ALT  --  46  --      Invalid input(s): \"CK\", \"TROPONIN\", \"PBNP\"    Signed: Alex Paredes Jr, MD

## 2025-03-08 NOTE — PROGRESS NOTES
[facility]  [address], [place] [id number] [phone]  [email or web address]  CARDIOLOGY PROGRESS NOTE    3/8/2025 10:53 AM    Admit Date: 3/5/2025    Admit Diagnosis:   Acute coronary syndrome (HCC) [I24.9]  NSTEMI (non-ST elevated myocardial infarction) (HCC) [I21.4]  Pulmonary edema with congestive heart failure (HCC) [I50.1]  Non-ST elevation MI (NSTEMI) (HCC) [I21.4]  Acute congestive heart failure, unspecified heart failure type (HCC) [I50.9]  Acute hypoxic respiratory failure (HCC) [J96.01]    Subjective:     Jennifer Hall was seen and examined at the bedside.  Feels less swollen.  No more chest pain.  No right groin or right wrist pain.    She ambulated to the elevators and back yesterday, a little fatigued and winded afterwards, but did well.     VSS.   Labs stable- Cr slightly elevated at 1.09, will transition to PO lasix this am.     BP (!) 111/58   Pulse 68   Temp 97.8 °F (36.6 °C)   Resp 16   Ht 1.676 m (5' 5.98\")   Wt 84.8 kg (186 lb 15.2 oz)   SpO2 97%   BMI 30.19 kg/m²     Current Facility-Administered Medications   Medication Dose Route Frequency    influenza split vaccine (PF) (AFLURIA;FLUARIX) injection 0.5 mL  1 Dose IntraMUSCular Prior to discharge    carvedilol (COREG) tablet 12.5 mg  12.5 mg Oral BID    furosemide (LASIX) injection 40 mg  40 mg IntraVENous BID    sacubitril-valsartan (ENTRESTO) 24-26 MG per tablet 0.5 tablet  0.5 tablet Oral BID    buPROPion (WELLBUTRIN XL) extended release tablet 150 mg  150 mg Oral Daily    sodium chloride flush 0.9 % injection 5-40 mL  5-40 mL IntraVENous 2 times per day    sodium chloride flush 0.9 % injection 5-40 mL  5-40 mL IntraVENous PRN    0.9 % sodium chloride infusion   IntraVENous PRN    ondansetron (ZOFRAN-ODT) disintegrating tablet 4 mg  4 mg Oral Q8H PRN    Or    ondansetron (ZOFRAN) injection 4 mg  4 mg IntraVENous Q6H PRN    polyethylene glycol (GLYCOLAX) packet 17 g  17 g

## 2025-03-08 NOTE — PROGRESS NOTES
I have reviewed Discharge Instructions with the patient. The patient verbalized understanding. Discharge medications reviewed with patient along with appropriate educational materials.  Opportunity for questions and clarification was provided. All lines removed without difficulty. Right groin Cath sites are clean, dry, and intact. Patient's belongings gathered and with patient. Patient is ready for discharge.  Pt taken down to the discharge area.

## 2025-03-08 NOTE — PROGRESS NOTES
End of Shift Note    Bedside shift change report given to GERRI Brush (oncoming nurse) by Alberto Jacome RN (offgoing nurse).  Report included the following information SBAR, Intake/Output, MAR, Recent Results, Cardiac Rhythm Sinus Rhythm, Quality Measures, and Dual Neuro Assessment    Shift worked:  7:00 PM-7:30 AM     Shift summary and any significant changes:    Patient had a headache throughout the night. I gave her Tylenol twice. She believed she was dehydrated, so I filled up her water pitcher and advised her to gradually drink water throughout the rest of the night, but to not over do it. Labs were drawn and resulted.    Concerns for physician to address:  Determine if patient needs to continue to diurese or discharge      [employer] phone for oncoming shift:   N/A       Activity:  Level of Assistance: Independent  Number times ambulated in hallways past shift: 0  Number of times OOB to chair past shift: 5+    Cardiac:   Cardiac Monitoring: Yes      Cardiac Rhythm: Sinus rhythm    Access:  Current line(s): PIV     Genitourinary:   Urinary Status: Voiding, Bathroom privileges    Respiratory:   O2 Device: None (Room air)  Chronic home O2 use?: NO  Incentive spirometer at bedside: YES    GI:  Last BM (including prior to admit): 03/03/25 (Pt stated)  Current diet:  ADULT DIET; Regular; 4 carb choices (60 gm/meal); Low Fat/Low Chol/High Fiber/2 gm Na  DIET ONE TIME MESSAGE;  Passing flatus: YES    Pain Management:   Patient states pain is manageable on current regimen: YES    Skin:  Sukhwinder Scale Score: 22  Interventions: Wound Offloading (Prevention Methods): Bed, pressure redistribution/air, Bed, pressure reduction mattress, Elevate heels, Pillows, Repositioning, Turning    Patient Safety:  Fall Risk: Nursing Judgement-Fall Risk High(Add Comments): Yes  Fall Risk Interventions  Nursing Judgement-Fall Risk High(Add Comments): Yes  Toilet Every 2 Hours-In Advance of Need: Yes  Hourly Visual Checks: Awake, In bed  Fall  Visual Posted: Socks  Room Door Open: Yes  Alarm On: Bed  Patient Moved Closer to Nursing Station: No    Active Consults:   IP CONSULT TO CARDIOLOGY  IP CONSULT TO CARDIAC REHAB  IP CONSULT TO CARDIAC REHAB    Length of Stay:  Expected LOS: 3  Actual LOS: 3    Alberto Jacome RN

## 2025-04-15 ENCOUNTER — APPOINTMENT (OUTPATIENT)
Facility: HOSPITAL | Age: 65
End: 2025-04-15
Payer: MEDICAID

## 2025-04-15 ENCOUNTER — HOSPITAL ENCOUNTER (EMERGENCY)
Facility: HOSPITAL | Age: 65
Discharge: HOME | End: 2025-04-15
Attending: EMERGENCY MEDICINE
Payer: MEDICAID

## 2025-04-15 VITALS
WEIGHT: 180 LBS | RESPIRATION RATE: 12 BRPM | BODY MASS INDEX: 29.07 KG/M2 | SYSTOLIC BLOOD PRESSURE: 146 MMHG | DIASTOLIC BLOOD PRESSURE: 54 MMHG | TEMPERATURE: 98.9 F | OXYGEN SATURATION: 93 % | HEART RATE: 61 BPM

## 2025-04-15 DIAGNOSIS — R05.3 CHRONIC COUGH: Primary | ICD-10-CM

## 2025-04-15 DIAGNOSIS — E87.6 HYPOKALEMIA: ICD-10-CM

## 2025-04-15 LAB
ALBUMIN SERPL-MCNC: 3.6 G/DL (ref 3.5–5)
ALBUMIN/GLOB SERPL: 1.1 (ref 1.1–2.2)
ALP SERPL-CCNC: 130 U/L (ref 45–117)
ALT SERPL-CCNC: 56 U/L (ref 12–78)
ANION GAP SERPL CALC-SCNC: 10 MMOL/L (ref 2–12)
AST SERPL-CCNC: 27 U/L (ref 15–37)
BASOPHILS # BLD: 0.01 K/UL (ref 0–0.1)
BASOPHILS NFR BLD: 0.2 % (ref 0–1)
BILIRUB SERPL-MCNC: 0.7 MG/DL (ref 0.2–1)
BUN SERPL-MCNC: 21 MG/DL (ref 6–20)
BUN/CREAT SERPL: 14 (ref 12–20)
CALCIUM SERPL-MCNC: 8.7 MG/DL (ref 8.5–10.1)
CHLORIDE SERPL-SCNC: 102 MMOL/L (ref 97–108)
CO2 SERPL-SCNC: 31 MMOL/L (ref 21–32)
CREAT SERPL-MCNC: 1.53 MG/DL (ref 0.55–1.02)
DIFFERENTIAL METHOD BLD: ABNORMAL
EOSINOPHIL # BLD: 0.25 K/UL (ref 0–0.4)
EOSINOPHIL NFR BLD: 4.7 % (ref 0–0.7)
ERYTHROCYTE [DISTWIDTH] IN BLOOD BY AUTOMATED COUNT: 13.3 % (ref 11.5–14.5)
GLOBULIN SER CALC-MCNC: 3.2 G/DL (ref 2–4)
GLUCOSE SERPL-MCNC: 103 MG/DL (ref 65–100)
HCT VFR BLD AUTO: 33.2 % (ref 35–47)
HGB BLD-MCNC: 11.2 G/DL (ref 11.5–16)
IMM GRANULOCYTES # BLD AUTO: 0 K/UL (ref 0–0.04)
IMM GRANULOCYTES NFR BLD AUTO: 0 % (ref 0–0.5)
LYMPHOCYTES # BLD: 1.7 K/UL (ref 0.8–3.5)
LYMPHOCYTES NFR BLD: 32.1 % (ref 12–49)
MAGNESIUM SERPL-MCNC: 2 MG/DL (ref 1.6–2.4)
MCH RBC QN AUTO: 30.1 PG (ref 26–34)
MCHC RBC AUTO-ENTMCNC: 33.7 G/DL (ref 30–36.5)
MCV RBC AUTO: 89.2 FL (ref 80–99)
MONOCYTES # BLD: 0.48 K/UL (ref 0–1)
MONOCYTES NFR BLD: 9.1 % (ref 5–13)
NEUTS SEG # BLD: 2.86 K/UL (ref 1.8–8)
NEUTS SEG NFR BLD: 53.9 % (ref 32–75)
NRBC # BLD: 0 K/UL (ref 0–0.01)
NRBC BLD-RTO: 0 PER 100 WBC
NT PRO BNP: 260 PG/ML (ref 0–125)
PLATELET # BLD AUTO: 317 K/UL (ref 150–400)
PMV BLD AUTO: 9.5 FL (ref 8.9–12.9)
POTASSIUM SERPL-SCNC: 2.9 MMOL/L (ref 3.5–5.1)
PROT SERPL-MCNC: 6.8 G/DL (ref 6.4–8.2)
RBC # BLD AUTO: 3.72 M/UL (ref 3.8–5.2)
SODIUM SERPL-SCNC: 143 MMOL/L (ref 136–145)
TROPONIN I SERPL HS-MCNC: 13 NG/L (ref 0–51)
WBC # BLD AUTO: 5.3 K/UL (ref 3.6–11)

## 2025-04-15 PROCEDURE — 83735 ASSAY OF MAGNESIUM: CPT

## 2025-04-15 PROCEDURE — 85025 COMPLETE CBC W/AUTO DIFF WBC: CPT

## 2025-04-15 PROCEDURE — 6370000000 HC RX 637 (ALT 250 FOR IP): Performed by: EMERGENCY MEDICINE

## 2025-04-15 PROCEDURE — 71045 X-RAY EXAM CHEST 1 VIEW: CPT

## 2025-04-15 PROCEDURE — 99285 EMERGENCY DEPT VISIT HI MDM: CPT

## 2025-04-15 PROCEDURE — 83880 ASSAY OF NATRIURETIC PEPTIDE: CPT

## 2025-04-15 PROCEDURE — 80053 COMPREHEN METABOLIC PANEL: CPT

## 2025-04-15 PROCEDURE — 36415 COLL VENOUS BLD VENIPUNCTURE: CPT

## 2025-04-15 PROCEDURE — 84484 ASSAY OF TROPONIN QUANT: CPT

## 2025-04-15 PROCEDURE — 93005 ELECTROCARDIOGRAM TRACING: CPT | Performed by: EMERGENCY MEDICINE

## 2025-04-15 RX ORDER — POTASSIUM CHLORIDE 750 MG/1
15 TABLET, EXTENDED RELEASE ORAL DAILY
Qty: 8 TABLET | Refills: 0 | Status: SHIPPED | OUTPATIENT
Start: 2025-04-15 | End: 2025-04-20

## 2025-04-15 RX ORDER — POTASSIUM CHLORIDE 750 MG/1
40 TABLET, EXTENDED RELEASE ORAL
Status: COMPLETED | OUTPATIENT
Start: 2025-04-15 | End: 2025-04-15

## 2025-04-15 RX ORDER — BENZONATATE 100 MG/1
100 CAPSULE ORAL 3 TIMES DAILY PRN
Qty: 30 CAPSULE | Refills: 0 | Status: SHIPPED | OUTPATIENT
Start: 2025-04-15 | End: 2025-04-25

## 2025-04-15 RX ADMIN — POTASSIUM CHLORIDE 40 MEQ: 750 TABLET, FILM COATED, EXTENDED RELEASE ORAL at 17:01

## 2025-04-15 ASSESSMENT — PAIN SCALES - GENERAL: PAINLEVEL_OUTOF10: 0

## 2025-04-15 ASSESSMENT — PAIN - FUNCTIONAL ASSESSMENT: PAIN_FUNCTIONAL_ASSESSMENT: 0-10

## 2025-04-15 NOTE — ED PROVIDER NOTES
1 TABLET BY MOUTH TWICE DAILY DO NOT CRUSH, CHEW OR SPLITHistorical Med      cetirizine (ZYRTEC) 10 MG tablet Take 1 tablet by mouth dailyHistorical Med      Cholecalciferol 50 MCG (2000 UT) TABS Take 1 tablet by mouth dailyHistorical Med      clobetasol (TEMOVATE) 0.05 % external solution Apply topically 2 times daily, Topical, 2 TIMES DAILY Starting Fri 5/20/2022, Historical Med      DULoxetine (CYMBALTA) 60 MG extended release capsule Take 1 capsule by mouth 2 times dailyHistorical Med      Ferrous Sulfate 324 MG TBEC Take 130 mg by mouth every morning (before breakfast)Historical Med      fesoterodine (TOVIAZ) 4 MG TB24 ER tablet TAKE 1 TAB BY MOUTH ONCE DAILYHistorical Med             SCREENINGS               No data recorded        PHYSICAL EXAM      ED Triage Vitals [04/15/25 1517]   Encounter Vitals Group      BP (!) 134/52      Systolic BP Percentile       Diastolic BP Percentile       Pulse 68      Respirations 20      Temp 98.9 °F (37.2 °C)      Temp src       SpO2 96 %      Weight - Scale 81.6 kg (180 lb)      Height       Head Circumference       Peak Flow       Pain Score       Pain Loc       Pain Education       Exclude from Growth Chart               Physical Exam      GENERAL APPEARANCE: Awake and alert. Cooperative. No acute distress.    HEAD: Normocephalic. Atraumatic.    EYES: EOM's grossly intact. Sclera anicteric.    ENT: Mucous membranes are moist. Tolerates saliva. No trismus.    NECK: Supple.    HEART: RRR. Radial pulses 2+.    LUNGS: Respirations unlabored. CTAB    ABDOMEN: Soft. Non-tender. No guarding or rebound.    EXTREMITIES: No acute deformities.  No pitting edema in bilateral lower extremity    SKIN: Warm and dry.    NEUROLOGICAL: Moves all 4 extremities spontaneously.    PSYCHIATRIC: Normal mood.     DIAGNOSTIC RESULTS   LABS:     Recent Results (from the past 24 hours)   CBC with Auto Differential    Collection Time: 04/15/25  3:50 PM   Result Value Ref Range    WBC 5.3 3.6 - 11.0

## 2025-04-15 NOTE — DISCHARGE INSTRUCTIONS
Use Tessalon Perles as needed for cough.  Continue to take your daily weights.  Stay well-hydrated at home.  You need to follow with your primary care physician in 1 week for repeat blood work to assess your creatinine as well as your potassium.  Return emerged part for chest pain, worsening shortness of breath or new or concerning symptom

## 2025-04-15 NOTE — ED TRIAGE NOTES
Pt arrived with c/o a cough that has been going on since she was discharged from [facility]. She states she has to prop herself up at night in order to breathe and to help stop coughing. She has a productive cough getting up green mucous

## 2025-04-16 LAB
EKG ATRIAL RATE: 60 BPM
EKG DIAGNOSIS: NORMAL
EKG P AXIS: 7 DEGREES
EKG P-R INTERVAL: 188 MS
EKG Q-T INTERVAL: 466 MS
EKG QRS DURATION: 116 MS
EKG QTC CALCULATION (BAZETT): 466 MS
EKG R AXIS: 32 DEGREES
EKG T AXIS: -9 DEGREES
EKG VENTRICULAR RATE: 60 BPM

## 2025-04-29 ENCOUNTER — HOSPITAL ENCOUNTER (OUTPATIENT)
Facility: HOSPITAL | Age: 65
Discharge: HOME | End: 2025-05-02
Payer: MEDICAID

## 2025-04-29 LAB — PHOSPHATE SERPL-MCNC: 3.2 MG/DL (ref 2.6–4.7)

## 2025-04-29 PROCEDURE — 36415 COLL VENOUS BLD VENIPUNCTURE: CPT

## 2025-04-29 PROCEDURE — 84100 ASSAY OF PHOSPHORUS: CPT

## 2025-06-06 ENCOUNTER — HOSPITAL ENCOUNTER (OUTPATIENT)
Facility: HOSPITAL | Age: 65
Discharge: HOME | End: 2025-06-09
Payer: MEDICAID

## 2025-06-06 LAB — PHOSPHATE SERPL-MCNC: 3.7 MG/DL (ref 2.6–4.7)

## 2025-06-06 PROCEDURE — 84100 ASSAY OF PHOSPHORUS: CPT

## 2025-06-06 PROCEDURE — 36415 COLL VENOUS BLD VENIPUNCTURE: CPT

## 2025-06-10 ENCOUNTER — HOSPITAL ENCOUNTER (OUTPATIENT)
Facility: HOSPITAL | Age: 65
Discharge: HOME | End: 2025-06-13
Payer: MEDICAID

## 2025-06-10 LAB
ANION GAP SERPL CALC-SCNC: 9 MMOL/L (ref 2–12)
BUN SERPL-MCNC: 14 MG/DL (ref 6–20)
BUN/CREAT SERPL: 14 (ref 12–20)
CALCIUM SERPL-MCNC: 8.9 MG/DL (ref 8.5–10.1)
CHLORIDE SERPL-SCNC: 102 MMOL/L (ref 97–108)
CO2 SERPL-SCNC: 30 MMOL/L (ref 21–32)
CREAT SERPL-MCNC: 1.02 MG/DL (ref 0.55–1.02)
GLUCOSE SERPL-MCNC: 115 MG/DL (ref 65–100)
MAGNESIUM SERPL-MCNC: 1.9 MG/DL (ref 1.6–2.4)
PHOSPHATE SERPL-MCNC: 4.2 MG/DL (ref 2.6–4.7)
POTASSIUM SERPL-SCNC: 3.2 MMOL/L (ref 3.5–5.1)
SODIUM SERPL-SCNC: 141 MMOL/L (ref 136–145)

## 2025-06-10 PROCEDURE — 84100 ASSAY OF PHOSPHORUS: CPT

## 2025-06-10 PROCEDURE — 83735 ASSAY OF MAGNESIUM: CPT

## 2025-06-10 PROCEDURE — 80048 BASIC METABOLIC PNL TOTAL CA: CPT

## 2025-06-10 PROCEDURE — 36415 COLL VENOUS BLD VENIPUNCTURE: CPT

## (undated) DEVICE — CATHETER DIAG 5FR L100CM LUMN ID0.047IN JR4 CRV 0 SIDE H

## (undated) DEVICE — CATHETER GUID 6FR L150CM RAP EXCHG L25CM TIP 5.1FR PUSHROD

## (undated) DEVICE — CUSTOM KT PTCA INFL DEV K05 00053H (ORDER MUTLIPLES OF 5 EACH)

## (undated) DEVICE — CATHETER GUID 6FR 0.071IN COR AMPLATZ L 0.75 MID

## (undated) DEVICE — SYRINGE ANGIO 10 CC BRL STD PRNT POLYCARB LT BLU MEDALLION

## (undated) DEVICE — KIT ACCS INTRO 4FR L10CM NDL 21GA L7CM GWIRE L40CM

## (undated) DEVICE — CORONARY IMAGING CATHETER: Brand: OPTICROSS™ 6 HD

## (undated) DEVICE — DRESSING HEMOSTATIC INTVENT W/O SLT QUIKCLOT

## (undated) DEVICE — CATHETER DIAG 5FR L100CM LUMN ID0.047IN JL4 CRV 0 SIDE H

## (undated) DEVICE — PINNACLE INTRODUCER SHEATH: Brand: PINNACLE

## (undated) DEVICE — RUNTHROUGH NS EXTRA FLOPPY PTCA GUIDEWIRE: Brand: RUNTHROUGH

## (undated) DEVICE — Device: Brand: FIELDER XT

## (undated) DEVICE — GLIDESHEATH SLENDER STAINLESS STEEL KIT: Brand: GLIDESHEATH SLENDER

## (undated) DEVICE — CATHETER INTVENT 5FR L135CM 0.014IN LO PROF GREATER TIP DSTL

## (undated) DEVICE — PROVE COVER: Brand: UNBRANDED

## (undated) DEVICE — SC 3W HP RA OFF NB - PG: Brand: NAMIC

## (undated) DEVICE — Device: Brand: ASAHI CORSAIR PRO XS

## (undated) DEVICE — HI-TORQUE VERSACORE FLOPPY GUIDE WIRE SYSTEM 145 CM: Brand: HI-TORQUE VERSACORE

## (undated) DEVICE — DEVICE US SLED PUL BK DISP ILAB

## (undated) DEVICE — PERCLOSE™ PROSTYLE™ SUTURE-MEDIATED CLOSURE AND REPAIR SYSTEM: Brand: PERCLOSE™ PROSTYLE™

## (undated) DEVICE — EXCHANGE DEVICE: Brand: TRAPPER™

## (undated) DEVICE — CATHETER BLLN 142CM SPRINTER LEGEND RX 1.25MMX15MM

## (undated) DEVICE — TUBING PRSS MON L6IN PVC M FEM CONN

## (undated) DEVICE — CATHETER GUID 6FR DIA0.071IN SHFT NYL STD L JR 4 CRV ENH

## (undated) DEVICE — 3M™ TEGADERM™ TRANSPARENT FILM DRESSING FRAME STYLE, 1626W, 4 IN X 4-3/4 IN (10 CM X 12 CM), 50/CT 4CT/CASE: Brand: 3M™ TEGADERM™

## (undated) DEVICE — GUIDEWIRE VASC L260CM 0.035IN J TIP L3MM PTFE FIX COR NAMIC

## (undated) DEVICE — CATH BLLN ANGIO 2.50X30MM SC EUPHORA RX

## (undated) DEVICE — GUIDEWIRE VASCULAR L145CM 0.035IN J TIP L3MM PTFE FIX COR NAMIC

## (undated) DEVICE — CATHETER COR DIAG TRANSFORMER 3.5 5FR L100CM 0 SIDE H

## (undated) DEVICE — TR BAND RADIAL ARTERY COMPRESSION DEVICE: Brand: TR BAND

## (undated) DEVICE — HEART CATH-MRMC: Brand: MEDLINE INDUSTRIES, INC.